# Patient Record
Sex: FEMALE | Race: BLACK OR AFRICAN AMERICAN | NOT HISPANIC OR LATINO
[De-identification: names, ages, dates, MRNs, and addresses within clinical notes are randomized per-mention and may not be internally consistent; named-entity substitution may affect disease eponyms.]

---

## 2017-07-17 ENCOUNTER — APPOINTMENT (OUTPATIENT)
Dept: CARDIOLOGY | Facility: CLINIC | Age: 57
End: 2017-07-17

## 2017-10-30 ENCOUNTER — EMERGENCY (EMERGENCY)
Facility: HOSPITAL | Age: 57
LOS: 0 days | Discharge: HOME | End: 2017-10-30

## 2017-10-30 DIAGNOSIS — L02.01 CUTANEOUS ABSCESS OF FACE: ICD-10-CM

## 2017-10-30 DIAGNOSIS — R07.9 CHEST PAIN, UNSPECIFIED: ICD-10-CM

## 2017-10-30 DIAGNOSIS — I10 ESSENTIAL (PRIMARY) HYPERTENSION: ICD-10-CM

## 2017-10-30 DIAGNOSIS — Z98.891 HISTORY OF UTERINE SCAR FROM PREVIOUS SURGERY: ICD-10-CM

## 2017-10-30 DIAGNOSIS — Z23 ENCOUNTER FOR IMMUNIZATION: ICD-10-CM

## 2017-10-30 DIAGNOSIS — Z79.899 OTHER LONG TERM (CURRENT) DRUG THERAPY: ICD-10-CM

## 2018-02-20 ENCOUNTER — OUTPATIENT (OUTPATIENT)
Dept: OUTPATIENT SERVICES | Facility: HOSPITAL | Age: 58
LOS: 1 days | Discharge: HOME | End: 2018-02-20

## 2018-02-20 DIAGNOSIS — Z12.31 ENCOUNTER FOR SCREENING MAMMOGRAM FOR MALIGNANT NEOPLASM OF BREAST: ICD-10-CM

## 2018-02-27 ENCOUNTER — OUTPATIENT (OUTPATIENT)
Dept: OUTPATIENT SERVICES | Facility: HOSPITAL | Age: 58
LOS: 1 days | Discharge: HOME | End: 2018-02-27

## 2018-02-27 DIAGNOSIS — R92.8 OTHER ABNORMAL AND INCONCLUSIVE FINDINGS ON DIAGNOSTIC IMAGING OF BREAST: ICD-10-CM

## 2018-02-27 DIAGNOSIS — F17.200 NICOTINE DEPENDENCE, UNSPECIFIED, UNCOMPLICATED: ICD-10-CM

## 2018-02-27 DIAGNOSIS — M54.5 LOW BACK PAIN: ICD-10-CM

## 2018-03-07 ENCOUNTER — RESULT REVIEW (OUTPATIENT)
Age: 58
End: 2018-03-07

## 2018-03-07 ENCOUNTER — OUTPATIENT (OUTPATIENT)
Dept: OUTPATIENT SERVICES | Facility: HOSPITAL | Age: 58
LOS: 1 days | Discharge: HOME | End: 2018-03-07

## 2018-03-12 ENCOUNTER — OUTPATIENT (OUTPATIENT)
Dept: OUTPATIENT SERVICES | Facility: HOSPITAL | Age: 58
LOS: 1 days | Discharge: HOME | End: 2018-03-12

## 2018-03-12 ENCOUNTER — APPOINTMENT (OUTPATIENT)
Dept: PODIATRY | Facility: CLINIC | Age: 58
End: 2018-03-12
Payer: SUBSIDIZED

## 2018-03-12 VITALS
TEMPERATURE: 98 F | WEIGHT: 180 LBS | SYSTOLIC BLOOD PRESSURE: 176 MMHG | HEIGHT: 66 IN | BODY MASS INDEX: 28.93 KG/M2 | HEART RATE: 94 BPM | DIASTOLIC BLOOD PRESSURE: 96 MMHG

## 2018-03-12 DIAGNOSIS — Z86.39 PERSONAL HISTORY OF OTHER ENDOCRINE, NUTRITIONAL AND METABOLIC DISEASE: ICD-10-CM

## 2018-03-12 DIAGNOSIS — M67.479 GANGLION, UNSPECIFIED ANKLE AND FOOT: ICD-10-CM

## 2018-03-12 DIAGNOSIS — M79.671 PAIN IN RIGHT FOOT: ICD-10-CM

## 2018-03-12 DIAGNOSIS — Z86.79 PERSONAL HISTORY OF OTHER DISEASES OF THE CIRCULATORY SYSTEM: ICD-10-CM

## 2018-03-12 PROCEDURE — 99203 OFFICE O/P NEW LOW 30 MIN: CPT

## 2018-03-14 DIAGNOSIS — R92.8 OTHER ABNORMAL AND INCONCLUSIVE FINDINGS ON DIAGNOSTIC IMAGING OF BREAST: ICD-10-CM

## 2018-03-14 DIAGNOSIS — N63.10 UNSPECIFIED LUMP IN THE RIGHT BREAST, UNSPECIFIED QUADRANT: ICD-10-CM

## 2018-03-14 DIAGNOSIS — N64.89 OTHER SPECIFIED DISORDERS OF BREAST: ICD-10-CM

## 2018-03-26 ENCOUNTER — APPOINTMENT (OUTPATIENT)
Dept: PODIATRY | Facility: CLINIC | Age: 58
End: 2018-03-26

## 2018-03-30 ENCOUNTER — OUTPATIENT (OUTPATIENT)
Dept: OUTPATIENT SERVICES | Facility: HOSPITAL | Age: 58
LOS: 1 days | Discharge: HOME | End: 2018-03-30

## 2018-03-30 DIAGNOSIS — M67.479 GANGLION, UNSPECIFIED ANKLE AND FOOT: ICD-10-CM

## 2018-04-02 ENCOUNTER — APPOINTMENT (OUTPATIENT)
Dept: CARDIOLOGY | Facility: CLINIC | Age: 58
End: 2018-04-02

## 2018-05-29 ENCOUNTER — APPOINTMENT (OUTPATIENT)
Dept: NEUROLOGY | Facility: CLINIC | Age: 58
End: 2018-05-29

## 2018-06-08 ENCOUNTER — APPOINTMENT (OUTPATIENT)
Dept: PULMONOLOGY | Facility: CLINIC | Age: 58
End: 2018-06-08

## 2019-03-01 ENCOUNTER — TRANSCRIPTION ENCOUNTER (OUTPATIENT)
Age: 59
End: 2019-03-01

## 2019-11-18 ENCOUNTER — TRANSCRIPTION ENCOUNTER (OUTPATIENT)
Age: 59
End: 2019-11-18

## 2019-11-25 ENCOUNTER — EMERGENCY (EMERGENCY)
Facility: HOSPITAL | Age: 59
LOS: 0 days | Discharge: HOME | End: 2019-11-25
Admitting: EMERGENCY MEDICINE
Payer: MEDICAID

## 2019-11-25 VITALS
SYSTOLIC BLOOD PRESSURE: 157 MMHG | DIASTOLIC BLOOD PRESSURE: 72 MMHG | OXYGEN SATURATION: 96 % | HEART RATE: 103 BPM | RESPIRATION RATE: 18 BRPM | TEMPERATURE: 99 F

## 2019-11-25 DIAGNOSIS — F17.200 NICOTINE DEPENDENCE, UNSPECIFIED, UNCOMPLICATED: ICD-10-CM

## 2019-11-25 DIAGNOSIS — R09.81 NASAL CONGESTION: ICD-10-CM

## 2019-11-25 DIAGNOSIS — R05 COUGH: ICD-10-CM

## 2019-11-25 PROCEDURE — 99283 EMERGENCY DEPT VISIT LOW MDM: CPT

## 2019-11-25 RX ORDER — AZITHROMYCIN 500 MG/1
1 TABLET, FILM COATED ORAL
Qty: 6 | Refills: 0
Start: 2019-11-25 | End: 2019-11-29

## 2019-11-25 RX ORDER — PSEUDOEPHEDRINE HCL 30 MG
1 TABLET ORAL
Qty: 12 | Refills: 0
Start: 2019-11-25 | End: 2019-11-26

## 2019-11-25 NOTE — ED PROVIDER NOTE - PATIENT PORTAL LINK FT
You can access the FollowMyHealth Patient Portal offered by [facility] by registering at the following website: [email or web address]. By joining [employer]’s FollowMyHealth portal, you will also be able to view your health information using other applications (apps) compatible with our system.

## 2019-11-25 NOTE — ED PROVIDER NOTE - OBJECTIVE STATEMENT
58 year old female with pmhx of HTN, + smoker. presents with productive cough x 1 month 58 year old female with pmhx of HTN, + smoker. presents with productive cough x 1 month. Pt taking prednisone and tessalon pearles with no relief. no cp, sob, fever, chills, calf pain or swelling, recent travel or sick contacts.

## 2019-11-25 NOTE — ED PROVIDER NOTE - PHYSICAL EXAMINATION
CONST: Well appearing in NAD  EYES: PERRL, EOMI, Sclera and conjunctiva clear.   ENT: No nasal discharge. TM's clear.Oropharynx normal appearing, no erythema or exudates. No abscess or swelling. Uvula midline.   NECK: Non-tender, no meningeal signs. normal ROM. supple   CARD: Normal S1 S2; Normal rate and rhythm  RESP: Equal BS B/L, No wheezes, rhonchi or rales. No distress  GI: Soft, non-tender, non-distended. no cva tenderness. normal BS  MS: Normal ROM in all extremities. No midline spinal tenderness. pulses 2 +. no calf tenderness or swelling  SKIN: Warm, dry, no acute rashes. Good turgor

## 2019-11-25 NOTE — ED PROVIDER NOTE - NS ED ROS FT
Review of Systems:  	•	CONSTITUTIONAL - no fever, no diaphoresis, no chills  	•	SKIN - no rash  	•	HEMATOLOGIC - no bleeding, no bruising  	•	EYES - no eye pain, no blurry vision  	•	ENT - no change in hearing, no sore throat, no ear pain or tinnitus  	•	RESPIRATORY - no shortness of breath, + cough  	•	CARDIAC - no chest pain, no palpitations  	•	GI - no abd pain, no nausea, no vomiting, no diarrhea, no constipation  	•	GENITO-URINARY - no discharge, no dysuria; no hematuria, no increased urinary frequency  	•	MUSCULOSKELETAL - no joint paint, no swelling, no redness  	•	NEUROLOGIC - no weakness, no headache, no paresthesias, no LOC

## 2020-08-17 ENCOUNTER — OUTPATIENT (OUTPATIENT)
Dept: OUTPATIENT SERVICES | Facility: HOSPITAL | Age: 60
LOS: 1 days | Discharge: HOME | End: 2020-08-17
Payer: MEDICAID

## 2020-08-17 ENCOUNTER — RESULT REVIEW (OUTPATIENT)
Age: 60
End: 2020-08-17

## 2020-08-17 DIAGNOSIS — R22.32 LOCALIZED SWELLING, MASS AND LUMP, LEFT UPPER LIMB: ICD-10-CM

## 2020-08-17 PROCEDURE — 76882 US LMTD JT/FCL EVL NVASC XTR: CPT | Mod: 26,LT

## 2022-04-25 ENCOUNTER — EMERGENCY (EMERGENCY)
Facility: HOSPITAL | Age: 62
LOS: 0 days | Discharge: HOME | End: 2022-04-25
Attending: STUDENT IN AN ORGANIZED HEALTH CARE EDUCATION/TRAINING PROGRAM | Admitting: STUDENT IN AN ORGANIZED HEALTH CARE EDUCATION/TRAINING PROGRAM
Payer: MEDICAID

## 2022-04-25 VITALS
TEMPERATURE: 98 F | HEART RATE: 78 BPM | WEIGHT: 109.57 LBS | DIASTOLIC BLOOD PRESSURE: 68 MMHG | SYSTOLIC BLOOD PRESSURE: 119 MMHG | OXYGEN SATURATION: 100 % | RESPIRATION RATE: 18 BRPM

## 2022-04-25 VITALS — WEIGHT: 199.96 LBS

## 2022-04-25 DIAGNOSIS — Z53.29 PROCEDURE AND TREATMENT NOT CARRIED OUT BECAUSE OF PATIENT'S DECISION FOR OTHER REASONS: ICD-10-CM

## 2022-04-25 DIAGNOSIS — I10 ESSENTIAL (PRIMARY) HYPERTENSION: ICD-10-CM

## 2022-04-25 DIAGNOSIS — M25.512 PAIN IN LEFT SHOULDER: ICD-10-CM

## 2022-04-25 DIAGNOSIS — F17.200 NICOTINE DEPENDENCE, UNSPECIFIED, UNCOMPLICATED: ICD-10-CM

## 2022-04-25 DIAGNOSIS — E78.5 HYPERLIPIDEMIA, UNSPECIFIED: ICD-10-CM

## 2022-04-25 DIAGNOSIS — E78.00 PURE HYPERCHOLESTEROLEMIA, UNSPECIFIED: ICD-10-CM

## 2022-04-25 DIAGNOSIS — L81.9 DISORDER OF PIGMENTATION, UNSPECIFIED: ICD-10-CM

## 2022-04-25 PROCEDURE — 99283 EMERGENCY DEPT VISIT LOW MDM: CPT

## 2022-04-25 RX ORDER — METHOCARBAMOL 500 MG/1
1 TABLET, FILM COATED ORAL
Qty: 21 | Refills: 0
Start: 2022-04-25 | End: 2022-05-01

## 2022-04-25 RX ORDER — IBUPROFEN 200 MG
600 TABLET ORAL ONCE
Refills: 0 | Status: COMPLETED | OUTPATIENT
Start: 2022-04-25 | End: 2022-04-25

## 2022-04-25 RX ORDER — IBUPROFEN 200 MG
1 TABLET ORAL
Qty: 21 | Refills: 0
Start: 2022-04-25 | End: 2022-05-01

## 2022-04-25 RX ORDER — METHOCARBAMOL 500 MG/1
750 TABLET, FILM COATED ORAL ONCE
Refills: 0 | Status: COMPLETED | OUTPATIENT
Start: 2022-04-25 | End: 2022-04-25

## 2022-04-25 RX ADMIN — METHOCARBAMOL 750 MILLIGRAM(S): 500 TABLET, FILM COATED ORAL at 10:50

## 2022-04-25 RX ADMIN — Medication 600 MILLIGRAM(S): at 10:51

## 2022-04-25 RX ADMIN — Medication 600 MILLIGRAM(S): at 11:30

## 2022-04-25 NOTE — ED PROVIDER NOTE - ATTENDING APP SHARED VISIT CONTRIBUTION OF CARE
61-year-old female with a past medical history significant for hypertension who presents with left shoulder pain.  Patient states that she has been having left shoulder pain for the last month.  Patient states that the pain is worse when trying to elevate the arm.  Patient denies any chest pain shortness of breath fever chills or trauma to the area.  Patient also denies any numbness tingling paresthesias or any other medical complaints.    VITAL SIGNS: I have reviewed nursing notes and confirm.  CONSTITUTIONAL: non-toxic, well appearing  SKIN: no rash, no petechiae.  EYES: PERRL, EOMI, pink conjunctiva, anicteric  ENT: tongue midline, no exudates, MMM  NECK: Supple; no meningismus, no JVD  CARD: RRR, no murmurs, equal radial pulses bilaterally 2+  RESP: CTAB, no respiratory distress  EXT: Normal ROM x4.   Detailed Left Arm Exam:      Inspection: unremarkable. No deformity, bruises, erythema, abrasions, or swelling.      Palpation: No tenderness, warmth, crepitus, or deformity.      Sensation: light touch intact.      Motor: wrist ROM WNL, elbow ROM WNL, and shoulder ROM WNL (with pain past 90 degrees) .      Perfusion: perfusion WNL. Radial pulse 2+.  NEURO: Alert, oriented x3.     a/p  61 yr old f that presents with L shoulder pain   -imaging  -pain management  -will dc pt with pcp/ortho follow up and strict return precautions. 61-year-old female with a past medical history significant for hypertension who presents with left shoulder pain.  Patient states that she has been having left shoulder pain for the last month.  Patient states that the pain is worse when trying to elevate the arm.  Patient denies any chest pain shortness of breath fever chills or trauma to the area.  Patient also denies any numbness tingling paresthesias or any other medical complaints.    VITAL SIGNS: I have reviewed nursing notes and confirm.  CONSTITUTIONAL: non-toxic, well appearing  SKIN: no rash, no petechiae.  EYES: PERRL, EOMI, pink conjunctiva, anicteric  ENT: tongue midline, no exudates, MMM  NECK: Supple; no meningismus, no JVD  CARD: RRR, no murmurs, equal radial pulses bilaterally 2+  RESP: CTAB, no respiratory distress  EXT: Normal ROM x4.   Detailed Left Arm Exam:      Inspection: unremarkable. No deformity, bruises, erythema, abrasions, or swelling.      Palpation: No tenderness, warmth, crepitus, or deformity.      Sensation: light touch intact.      Motor: wrist ROM WNL, elbow ROM WNL, and shoulder ROM WNL (with pain past 90 degrees) .      Perfusion: perfusion WNL. Radial pulse 2+.  NEURO: Alert, oriented x3.     a/p  61 yr old f that presents with L shoulder pain   -imaging  -pain management  -pt eloped during evaluation

## 2022-04-25 NOTE — ED ADULT NURSE NOTE - NS ED NURSE ELOPE COMMENTS
Pt stated, "This XR is taking forever. I'm leaving." MICHAEL Abrams spoke to pt and gave verbal ff up instructions.

## 2022-04-25 NOTE — ED ADULT NURSE NOTE - HIV OFFER
Opt out Chart reviewed. IE Completed. Patient without complaints of pain at rest, agreeable to PT. Patient received OOB in chair, NAD, +tele, +IV hep lock, wife present, RN Esha cleared patient for treatment.

## 2022-04-25 NOTE — ED PROVIDER NOTE - OBJECTIVE STATEMENT
60 y/o female with hx HTN, HDL, smoker presents to the ED c/o "I have left shoulder pain for over 1 month. It is worse with movement. I don't remember injuring it. I also have some skin discoloration to my pinky finger." no cp/ sob/ dizziness/ weakness

## 2022-04-25 NOTE — ED PROVIDER NOTE - CLINICAL SUMMARY MEDICAL DECISION MAKING FREE TEXT BOX
61 yr old f that presents with L shoulder pain   -imaging  -pain management  -pt eloped during evaluation

## 2022-04-25 NOTE — ED PROVIDER NOTE - NS ED ATTENDING STATEMENT MOD
This was a shared visit with the DANICA. I reviewed and verified the documentation and independently performed the documented:

## 2022-05-05 PROBLEM — E78.00 PURE HYPERCHOLESTEROLEMIA, UNSPECIFIED: Chronic | Status: ACTIVE | Noted: 2022-04-25

## 2022-05-05 PROBLEM — I10 ESSENTIAL (PRIMARY) HYPERTENSION: Chronic | Status: ACTIVE | Noted: 2022-04-25

## 2022-07-21 ENCOUNTER — APPOINTMENT (OUTPATIENT)
Dept: PLASTIC SURGERY | Facility: CLINIC | Age: 62
End: 2022-07-21

## 2022-09-14 ENCOUNTER — OUTPATIENT (OUTPATIENT)
Dept: OUTPATIENT SERVICES | Facility: HOSPITAL | Age: 62
LOS: 1 days | Discharge: HOME | End: 2022-09-14

## 2022-09-14 ENCOUNTER — APPOINTMENT (OUTPATIENT)
Dept: GASTROENTEROLOGY | Facility: CLINIC | Age: 62
End: 2022-09-14

## 2022-09-14 ENCOUNTER — NON-APPOINTMENT (OUTPATIENT)
Age: 62
End: 2022-09-14

## 2022-09-14 VITALS
WEIGHT: 228 LBS | TEMPERATURE: 98 F | BODY MASS INDEX: 36.64 KG/M2 | HEART RATE: 75 BPM | DIASTOLIC BLOOD PRESSURE: 76 MMHG | HEIGHT: 66 IN | OXYGEN SATURATION: 93 % | SYSTOLIC BLOOD PRESSURE: 134 MMHG

## 2022-09-14 DIAGNOSIS — R11.0 NAUSEA: ICD-10-CM

## 2022-09-14 DIAGNOSIS — F17.200 NICOTINE DEPENDENCE, UNSPECIFIED, UNCOMPLICATED: ICD-10-CM

## 2022-09-14 DIAGNOSIS — R11.10 VOMITING, UNSPECIFIED: ICD-10-CM

## 2022-09-14 DIAGNOSIS — Z12.11 ENCOUNTER FOR SCREENING FOR MALIGNANT NEOPLASM OF COLON: ICD-10-CM

## 2022-09-14 DIAGNOSIS — Z00.00 ENCOUNTER FOR GENERAL ADULT MEDICAL EXAMINATION WITHOUT ABNORMAL FINDINGS: ICD-10-CM

## 2022-09-14 DIAGNOSIS — Z80.3 FAMILY HISTORY OF MALIGNANT NEOPLASM OF BREAST: ICD-10-CM

## 2022-09-14 DIAGNOSIS — R10.13 EPIGASTRIC PAIN: ICD-10-CM

## 2022-09-14 DIAGNOSIS — Z80.0 FAMILY HISTORY OF MALIGNANT NEOPLASM OF DIGESTIVE ORGANS: ICD-10-CM

## 2022-09-14 PROCEDURE — 99203 OFFICE O/P NEW LOW 30 MIN: CPT | Mod: GC

## 2022-09-15 PROBLEM — Z12.11 COLON CANCER SCREENING: Status: ACTIVE | Noted: 2022-09-15

## 2022-09-15 PROBLEM — R11.10 VOMITING: Status: ACTIVE | Noted: 2022-09-15

## 2022-09-15 PROBLEM — R11.0 NAUSEA: Status: ACTIVE | Noted: 2022-09-15

## 2022-09-15 NOTE — HISTORY OF PRESENT ILLNESS
[FreeTextEntry1] :  61-year-old female known to have DM and HTN presenting for several months history of nausea.\par History goes back to 5 months ago when patient started having random episodes of nausea. mostly occurring around to times a week, sometimes it is postprandial or around strong odors.\par Associated with vomiting (not all the times), non bilious non bloody and with bloating after eating.\par No abdominal pain, no melena, no fresh blood in stool or vomit, no weight loss, no change in bowel habits.\par Patient smokes and drinks coffee daily\par Her sister has history of gastric cancer and other sister has breast cancer.\par She is otherwise doing well and has no complaints.

## 2022-09-15 NOTE — REVIEW OF SYSTEMS
[Vomiting] : vomiting [Heartburn] : heartburn [Bloating (gassiness)] : bloating [Negative] : Respiratory [Heart Rate Is Fast] : the heart rate was not fast [Chest Pain] : no chest pain [As Noted in HPI] : as noted in HPI [Joint Swelling] : no joint swelling [Confused] : no confusion [Dizziness] : no dizziness

## 2022-09-15 NOTE — ASSESSMENT
[FreeTextEntry1] : 61-year-old female known to have DM and HTN presenting for several months history of nausea.\par \par #Nausea and bloating\par - Symptoms could be due to gerd/gatritis vs malignancy\par - Patient is not on Protonix\par - Advised to avoid smoking, coffee, and alcohol intake\par - Advised to not lay down right after eating\par - Will start on protonix daily\par - Will schedule an upper endoscopy given her age and her family history \par \par #HCM\par - Patient did a colonoscopy around 7-10 years ago\par - Will schedule for a colonoscopy with the endoscopy

## 2022-09-15 NOTE — PHYSICAL EXAM
[Alert] : alert [Healthy Appearing] : healthy appearing [Normal Appearance] : the appearance of the neck was normal [No Respiratory Distress] : no respiratory distress [Auscultation Breath Sounds / Voice Sounds] : lungs were clear to auscultation bilaterally [Heart Rate And Rhythm] : heart rate was normal and rhythm regular [Normal S1, S2] : normal S1 and S2 [Bowel Sounds] : normal bowel sounds [Abdomen Tenderness] : non-tender [Abdomen Soft] : soft [Normal Voice/Communication] : normal voice/communication [No Acute Distress] : no acute distress [Sclera] : the sclera and conjunctiva were normal [No Acc Muscle Use] : no accessory muscle use [No CVA Tenderness] : no CVA  tenderness [No Spinal Tenderness] : no spinal tenderness [] : no rash [Oriented To Time, Place, And Person] : oriented to person, place, and time

## 2022-10-11 ENCOUNTER — LABORATORY RESULT (OUTPATIENT)
Age: 62
End: 2022-10-11

## 2022-12-27 ENCOUNTER — EMERGENCY (EMERGENCY)
Facility: HOSPITAL | Age: 62
LOS: 0 days | Discharge: HOME | End: 2022-12-27
Attending: STUDENT IN AN ORGANIZED HEALTH CARE EDUCATION/TRAINING PROGRAM | Admitting: STUDENT IN AN ORGANIZED HEALTH CARE EDUCATION/TRAINING PROGRAM
Payer: MEDICAID

## 2022-12-27 VITALS
DIASTOLIC BLOOD PRESSURE: 74 MMHG | RESPIRATION RATE: 20 BRPM | SYSTOLIC BLOOD PRESSURE: 159 MMHG | OXYGEN SATURATION: 95 % | HEART RATE: 91 BPM | TEMPERATURE: 99 F

## 2022-12-27 DIAGNOSIS — F17.200 NICOTINE DEPENDENCE, UNSPECIFIED, UNCOMPLICATED: ICD-10-CM

## 2022-12-27 DIAGNOSIS — J44.9 CHRONIC OBSTRUCTIVE PULMONARY DISEASE, UNSPECIFIED: ICD-10-CM

## 2022-12-27 DIAGNOSIS — R05.9 COUGH, UNSPECIFIED: ICD-10-CM

## 2022-12-27 DIAGNOSIS — E78.00 PURE HYPERCHOLESTEROLEMIA, UNSPECIFIED: ICD-10-CM

## 2022-12-27 DIAGNOSIS — I10 ESSENTIAL (PRIMARY) HYPERTENSION: ICD-10-CM

## 2022-12-27 DIAGNOSIS — B34.9 VIRAL INFECTION, UNSPECIFIED: ICD-10-CM

## 2022-12-27 DIAGNOSIS — M79.10 MYALGIA, UNSPECIFIED SITE: ICD-10-CM

## 2022-12-27 PROCEDURE — 99284 EMERGENCY DEPT VISIT MOD MDM: CPT

## 2022-12-27 RX ORDER — PHENYLEPHRINE HCL, BROMPHENIRAMINE MALEATE 5; 2 MG/10ML; MG/10ML
10 SOLUTION ORAL
Qty: 420 | Refills: 0
Start: 2022-12-27 | End: 2023-01-02

## 2022-12-27 NOTE — ED PROVIDER NOTE - OBJECTIVE STATEMENT
Patient is a 62-year-old female, history of hyperlipidemia, hypertension, COPD, current smoker.  Presenting for cough and body aches.  Patient was recently at a  and was exposed to small children with respiratory symptoms.  Denies fever or productive cough.  Denies chest pain or dyspnea.  Denies prior COPD exacerbations or need for supplemental oxygen.  No other complaints - no rhinorrhea, sore throat, palpitations, abd pain, NVD, dysuria, hematuria, new joint pain, FND, rash, trauma.

## 2022-12-27 NOTE — ED PROVIDER NOTE - CLINICAL SUMMARY MEDICAL DECISION MAKING FREE TEXT BOX
pt p/w viral syndrome since Friday. No vomiting, chest pain, sob. flu + covid swab sent, pt given supportive care,  Patient given return precautions, f/u instructions and verbalizes understanding.

## 2022-12-27 NOTE — ED PROVIDER NOTE - PROGRESS NOTE DETAILS
Resident AO: Discussed management and care with patient - she was comfortable with discharge, and agreed. Return precautions given.

## 2022-12-27 NOTE — ED PROVIDER NOTE - PHYSICAL EXAMINATION
_  Vital signs reviewed; ABCs intact  GENERAL: Well nourished, comfortable  SKIN: Warm, dry  HEAD & NECK: NCAT, supple neck  EYES: EOMI, PER B/L  ENT: MMM; uvula midline; no oropharyngeal erythema or exudates  CARD: RRR, S1, S2; no murmurs, no rubs, no gallops  RESP: Normal respiratory effort, CTAB, no rales, no wheezing  ABD: Soft, ND, NT, no rebound, no guarding, +BS; no CVAT  EXT: Pulses palpable distally  NEUROMSK: Grossly intact  PSYCH: AAOx3, cooperative, appropriate

## 2022-12-27 NOTE — ED PROVIDER NOTE - NSFOLLOWUPINSTRUCTIONS_ED_ALL_ED_FT
Take bromfed as prescribed for cough.     You can receive your FLU + COVID result by registering on the- [employer]. You can also call [phone] [place] [address] for results.    Viral Respiratory Infection    A viral respiratory infection is an illness that affects parts of the body used for breathing, like the lungs, nose, and throat. It is caused by a germ called a virus. Symptoms can include runny nose, coughing, sneezing, fatigue, body aches, sore throat, fever, or headache. Over the counter medicine can be used to manage the symptoms but the infection itself goes away on its own.     SEEK IMMEDIATE MEDICAL CARE IF YOU HAVE THE FOLLOWING SYMPTOMS: shortness of breath, chest pain, fever over 10 days, lightheadedness/dizziness.

## 2022-12-27 NOTE — ED PROVIDER NOTE - PATIENT PORTAL LINK FT
You can access the FollowMyHealth Patient Portal offered by [facility] by registering at the following website: [email or web address]. By joining [employer]’s FollowMyHealth portal, you will also be able to view your health information using other applications (apps) compatible with our system.

## 2022-12-28 RX ORDER — PHENYLEPHRINE HCL, BROMPHENIRAMINE MALEATE 5; 2 MG/10ML; MG/10ML
10 SOLUTION ORAL
Qty: 420 | Refills: 0
Start: 2022-12-28 | End: 2023-01-03

## 2023-01-20 ENCOUNTER — OUTPATIENT (OUTPATIENT)
Dept: OUTPATIENT SERVICES | Facility: HOSPITAL | Age: 63
LOS: 1 days | Discharge: HOME | End: 2023-01-20
Payer: MEDICAID

## 2023-01-20 DIAGNOSIS — R92.8 OTHER ABNORMAL AND INCONCLUSIVE FINDINGS ON DIAGNOSTIC IMAGING OF BREAST: ICD-10-CM

## 2023-01-20 PROCEDURE — 76642 ULTRASOUND BREAST LIMITED: CPT | Mod: 26,LT

## 2023-01-20 PROCEDURE — 77061 BREAST TOMOSYNTHESIS UNI: CPT | Mod: 26

## 2023-01-20 PROCEDURE — 77065 DX MAMMO INCL CAD UNI: CPT | Mod: 26,LT

## 2023-03-17 ENCOUNTER — INPATIENT (INPATIENT)
Facility: HOSPITAL | Age: 63
LOS: 11 days | Discharge: HOME HEALTH | DRG: 165 | End: 2023-03-29
Attending: THORACIC SURGERY (CARDIOTHORACIC VASCULAR SURGERY) | Admitting: INTERNAL MEDICINE
Payer: MEDICAID

## 2023-03-17 VITALS
WEIGHT: 210.1 LBS | SYSTOLIC BLOOD PRESSURE: 159 MMHG | HEART RATE: 87 BPM | HEIGHT: 66 IN | TEMPERATURE: 99 F | DIASTOLIC BLOOD PRESSURE: 90 MMHG | RESPIRATION RATE: 20 BRPM

## 2023-03-17 LAB
ALBUMIN SERPL ELPH-MCNC: 4.4 G/DL — SIGNIFICANT CHANGE UP (ref 3.5–5.2)
ALP SERPL-CCNC: 111 U/L — SIGNIFICANT CHANGE UP (ref 30–115)
ALT FLD-CCNC: 12 U/L — SIGNIFICANT CHANGE UP (ref 0–41)
ANION GAP SERPL CALC-SCNC: 8 MMOL/L — SIGNIFICANT CHANGE UP (ref 7–14)
AST SERPL-CCNC: 17 U/L — SIGNIFICANT CHANGE UP (ref 0–41)
BASOPHILS # BLD AUTO: 0.03 K/UL — SIGNIFICANT CHANGE UP (ref 0–0.2)
BASOPHILS NFR BLD AUTO: 0.6 % — SIGNIFICANT CHANGE UP (ref 0–1)
BILIRUB SERPL-MCNC: 0.3 MG/DL — SIGNIFICANT CHANGE UP (ref 0.2–1.2)
BUN SERPL-MCNC: 10 MG/DL — SIGNIFICANT CHANGE UP (ref 10–20)
CALCIUM SERPL-MCNC: 9.6 MG/DL — SIGNIFICANT CHANGE UP (ref 8.4–10.5)
CHLORIDE SERPL-SCNC: 101 MMOL/L — SIGNIFICANT CHANGE UP (ref 98–110)
CO2 SERPL-SCNC: 28 MMOL/L — SIGNIFICANT CHANGE UP (ref 17–32)
CREAT SERPL-MCNC: 0.9 MG/DL — SIGNIFICANT CHANGE UP (ref 0.7–1.5)
EGFR: 71 ML/MIN/1.73M2 — SIGNIFICANT CHANGE UP
EOSINOPHIL # BLD AUTO: 0.25 K/UL — SIGNIFICANT CHANGE UP (ref 0–0.7)
EOSINOPHIL NFR BLD AUTO: 4.8 % — SIGNIFICANT CHANGE UP (ref 0–8)
GLUCOSE SERPL-MCNC: 96 MG/DL — SIGNIFICANT CHANGE UP (ref 70–99)
HCT VFR BLD CALC: 36.7 % — LOW (ref 37–47)
HGB BLD-MCNC: 12.1 G/DL — SIGNIFICANT CHANGE UP (ref 12–16)
IMM GRANULOCYTES NFR BLD AUTO: 0.2 % — SIGNIFICANT CHANGE UP (ref 0.1–0.3)
LIDOCAIN IGE QN: 43 U/L — SIGNIFICANT CHANGE UP (ref 7–60)
LYMPHOCYTES # BLD AUTO: 2.34 K/UL — SIGNIFICANT CHANGE UP (ref 1.2–3.4)
LYMPHOCYTES # BLD AUTO: 44.7 % — SIGNIFICANT CHANGE UP (ref 20.5–51.1)
MCHC RBC-ENTMCNC: 30.5 PG — SIGNIFICANT CHANGE UP (ref 27–31)
MCHC RBC-ENTMCNC: 33 G/DL — SIGNIFICANT CHANGE UP (ref 32–37)
MCV RBC AUTO: 92.4 FL — SIGNIFICANT CHANGE UP (ref 81–99)
MONOCYTES # BLD AUTO: 0.52 K/UL — SIGNIFICANT CHANGE UP (ref 0.1–0.6)
MONOCYTES NFR BLD AUTO: 9.9 % — HIGH (ref 1.7–9.3)
NEUTROPHILS # BLD AUTO: 2.08 K/UL — SIGNIFICANT CHANGE UP (ref 1.4–6.5)
NEUTROPHILS NFR BLD AUTO: 39.8 % — LOW (ref 42.2–75.2)
NRBC # BLD: 0 /100 WBCS — SIGNIFICANT CHANGE UP (ref 0–0)
PLATELET # BLD AUTO: 187 K/UL — SIGNIFICANT CHANGE UP (ref 130–400)
POTASSIUM SERPL-MCNC: 4.3 MMOL/L — SIGNIFICANT CHANGE UP (ref 3.5–5)
POTASSIUM SERPL-SCNC: 4.3 MMOL/L — SIGNIFICANT CHANGE UP (ref 3.5–5)
PROT SERPL-MCNC: 7.5 G/DL — SIGNIFICANT CHANGE UP (ref 6–8)
RBC # BLD: 3.97 M/UL — LOW (ref 4.2–5.4)
RBC # FLD: 13.4 % — SIGNIFICANT CHANGE UP (ref 11.5–14.5)
SARS-COV-2 RNA SPEC QL NAA+PROBE: SIGNIFICANT CHANGE UP
SODIUM SERPL-SCNC: 137 MMOL/L — SIGNIFICANT CHANGE UP (ref 135–146)
TROPONIN T SERPL-MCNC: <0.01 NG/ML — SIGNIFICANT CHANGE UP
TROPONIN T SERPL-MCNC: <0.01 NG/ML — SIGNIFICANT CHANGE UP
WBC # BLD: 5.23 K/UL — SIGNIFICANT CHANGE UP (ref 4.8–10.8)
WBC # FLD AUTO: 5.23 K/UL — SIGNIFICANT CHANGE UP (ref 4.8–10.8)

## 2023-03-17 PROCEDURE — 71250 CT THORAX DX C-: CPT | Mod: 26,MA

## 2023-03-17 PROCEDURE — 71046 X-RAY EXAM CHEST 2 VIEWS: CPT | Mod: 26

## 2023-03-17 PROCEDURE — 99223 1ST HOSP IP/OBS HIGH 75: CPT

## 2023-03-17 RX ORDER — ASPIRIN/CALCIUM CARB/MAGNESIUM 324 MG
81 TABLET ORAL DAILY
Refills: 0 | Status: DISCONTINUED | OUTPATIENT
Start: 2023-03-17 | End: 2023-03-23

## 2023-03-17 RX ORDER — CARVEDILOL PHOSPHATE 80 MG/1
6.25 CAPSULE, EXTENDED RELEASE ORAL EVERY 12 HOURS
Refills: 0 | Status: DISCONTINUED | OUTPATIENT
Start: 2023-03-17 | End: 2023-03-18

## 2023-03-17 RX ORDER — AMLODIPINE BESYLATE 2.5 MG/1
10 TABLET ORAL DAILY
Refills: 0 | Status: DISCONTINUED | OUTPATIENT
Start: 2023-03-17 | End: 2023-03-22

## 2023-03-17 RX ORDER — ATORVASTATIN CALCIUM 80 MG/1
40 TABLET, FILM COATED ORAL AT BEDTIME
Refills: 0 | Status: DISCONTINUED | OUTPATIENT
Start: 2023-03-17 | End: 2023-03-23

## 2023-03-17 RX ADMIN — CARVEDILOL PHOSPHATE 6.25 MILLIGRAM(S): 80 CAPSULE, EXTENDED RELEASE ORAL at 19:20

## 2023-03-17 RX ADMIN — ATORVASTATIN CALCIUM 40 MILLIGRAM(S): 80 TABLET, FILM COATED ORAL at 21:54

## 2023-03-17 NOTE — ED PROVIDER NOTE - OBJECTIVE STATEMENT
62y Female with past medical history of high cholesterol, hypertension, COPD, prediabetes, tobacco use presents for chest pain since last night.  Patient reports stabbing and squeezing chest pain at rest that is localized to the center and lower right side of her chest.  States it is nonradiating.  Endorses slight shortness of breath during the episodes and dizziness.  No fevers, chills, nausea, vomiting, diarrhea, abdominal pain, weakness, numbness, palpitations.  Patient endorses tobacco use.  Never had cardiac work-up in the past or stress test.  No family history of cardiac events.  Denies alcohol and substance use.

## 2023-03-17 NOTE — ED ADULT NURSE REASSESSMENT NOTE - NS ED NURSE REASSESS COMMENT FT1
Hand off report given by previous nurse Cain. PT came for chest pain. Pt is pending CTA in morning. She denies CP at this moment and on cardiac monitor. Plan of care explained to pt. Comfort and safety measure in place. Will continue to monitor pt.

## 2023-03-17 NOTE — ED PROVIDER NOTE - CLINICAL SUMMARY MEDICAL DECISION MAKING FREE TEXT BOX
62-year-old female presents today with chest pain since last night.  Patient is hemodynamically stable and well-appearing on evaluation.  Patient EKG and cardiac work-up performed which was unremarkable.  Patient placed into observation for further evaluation and care and testing.

## 2023-03-17 NOTE — ED CDU PROVIDER INITIAL DAY NOTE - CLINICAL SUMMARY MEDICAL DECISION MAKING FREE TEXT BOX
Pt presents with chest pain, CE neg. Chest x-ray showed a questionable retrocardiac opacity. CT scan chest no opacity, non specific LN. CCTA-calcium score is high. Stress testing.

## 2023-03-17 NOTE — ED PROVIDER NOTE - PHYSICAL EXAMINATION
VITAL SIGNS: I have reviewed nursing notes and confirm.  CONSTITUTIONAL: well-appearing, non-toxic, NAD  SKIN: Warm dry, normal skin turgor  HEAD: NCAT  EYES: EOMI, PERRLA, no scleral icterus  ENT: Moist mucous membranes, normal pharynx with no erythema or exudates  NECK: Supple; non tender. Full ROM. No cervical LAD  CARD: RRR, no murmurs, rubs or gallops, no chest wall tenderness  RESP: clear to ausculation b/l.  No rales, rhonchi, or wheezing.  ABD: soft, + BS, non-tender, non-distended, no rebound or guarding. No CVA tenderness  EXT: Full ROM, no bony tenderness, no pedal edema, no calf tenderness  NEURO: normal motor. normal sensory. CN II-XII intact. Cerebellar testing normal. Normal gait.  PSYCH: Cooperative, appropriate.

## 2023-03-17 NOTE — ED CDU PROVIDER INITIAL DAY NOTE - ATTENDING APP SHARED VISIT CONTRIBUTION OF CARE
Pt presents with a feeling of "rushing" in her chest. She describes a thumping in he chest. This was associated with shortness of breath and diaphoresis. Pt was watching TV at that time. Now is feeling better. Hx of HTN, DM, tobacco use, On exam S1S2 rrr, no murmur, lungs occasional rhonchi, ext neg for edema.

## 2023-03-17 NOTE — ED CDU PROVIDER INITIAL DAY NOTE - PROGRESS NOTE DETAILS
received signout from Caleb Erwin - pt in obs for evaluation of chest pain - no prior cardiac workup; ct chest(non-contrast) neg; ce/ekg x2, covid pending; pt to have ccta in am; will continue to follow;

## 2023-03-18 DIAGNOSIS — R07.89 OTHER CHEST PAIN: ICD-10-CM

## 2023-03-18 LAB — D DIMER BLD IA.RAPID-MCNC: 203 NG/ML DDU — SIGNIFICANT CHANGE UP

## 2023-03-18 PROCEDURE — 85018 HEMOGLOBIN: CPT

## 2023-03-18 PROCEDURE — C1894: CPT

## 2023-03-18 PROCEDURE — 85610 PROTHROMBIN TIME: CPT

## 2023-03-18 PROCEDURE — C1769: CPT

## 2023-03-18 PROCEDURE — 87075 CULTR BACTERIA EXCEPT BLOOD: CPT

## 2023-03-18 PROCEDURE — 84436 ASSAY OF TOTAL THYROXINE: CPT

## 2023-03-18 PROCEDURE — 93970 EXTREMITY STUDY: CPT | Mod: 26

## 2023-03-18 PROCEDURE — C1751: CPT

## 2023-03-18 PROCEDURE — 81001 URINALYSIS AUTO W/SCOPE: CPT

## 2023-03-18 PROCEDURE — 86900 BLOOD TYPING SEROLOGIC ABO: CPT

## 2023-03-18 PROCEDURE — 84295 ASSAY OF SERUM SODIUM: CPT

## 2023-03-18 PROCEDURE — 80053 COMPREHEN METABOLIC PANEL: CPT

## 2023-03-18 PROCEDURE — C1889: CPT

## 2023-03-18 PROCEDURE — 99407 BEHAV CHNG SMOKING > 10 MIN: CPT

## 2023-03-18 PROCEDURE — 86891 AUTOLOGOUS BLOOD OP SALVAGE: CPT

## 2023-03-18 PROCEDURE — 88307 TISSUE EXAM BY PATHOLOGIST: CPT

## 2023-03-18 PROCEDURE — 87070 CULTURE OTHR SPECIMN AEROBIC: CPT

## 2023-03-18 PROCEDURE — 84443 ASSAY THYROID STIM HORMONE: CPT

## 2023-03-18 PROCEDURE — 82962 GLUCOSE BLOOD TEST: CPT

## 2023-03-18 PROCEDURE — 99223 1ST HOSP IP/OBS HIGH 75: CPT | Mod: 25

## 2023-03-18 PROCEDURE — 36415 COLL VENOUS BLD VENIPUNCTURE: CPT

## 2023-03-18 PROCEDURE — 93005 ELECTROCARDIOGRAM TRACING: CPT

## 2023-03-18 PROCEDURE — 83036 HEMOGLOBIN GLYCOSYLATED A1C: CPT

## 2023-03-18 PROCEDURE — 86850 RBC ANTIBODY SCREEN: CPT

## 2023-03-18 PROCEDURE — A9500: CPT

## 2023-03-18 PROCEDURE — 80048 BASIC METABOLIC PNL TOTAL CA: CPT

## 2023-03-18 PROCEDURE — 97163 PT EVAL HIGH COMPLEX 45 MIN: CPT | Mod: GP

## 2023-03-18 PROCEDURE — 71045 X-RAY EXAM CHEST 1 VIEW: CPT

## 2023-03-18 PROCEDURE — 97116 GAIT TRAINING THERAPY: CPT | Mod: GP

## 2023-03-18 PROCEDURE — 86901 BLOOD TYPING SEROLOGIC RH(D): CPT

## 2023-03-18 PROCEDURE — P9045: CPT

## 2023-03-18 PROCEDURE — 82803 BLOOD GASES ANY COMBINATION: CPT

## 2023-03-18 PROCEDURE — 84100 ASSAY OF PHOSPHORUS: CPT

## 2023-03-18 PROCEDURE — 85379 FIBRIN DEGRADATION QUANT: CPT

## 2023-03-18 PROCEDURE — 93017 CV STRESS TEST TRACING ONLY: CPT

## 2023-03-18 PROCEDURE — 85027 COMPLETE CBC AUTOMATED: CPT

## 2023-03-18 PROCEDURE — 84132 ASSAY OF SERUM POTASSIUM: CPT

## 2023-03-18 PROCEDURE — 85730 THROMBOPLASTIN TIME PARTIAL: CPT

## 2023-03-18 PROCEDURE — 87640 STAPH A DNA AMP PROBE: CPT

## 2023-03-18 PROCEDURE — 97110 THERAPEUTIC EXERCISES: CPT | Mod: GP

## 2023-03-18 PROCEDURE — 80061 LIPID PANEL: CPT

## 2023-03-18 PROCEDURE — 83735 ASSAY OF MAGNESIUM: CPT

## 2023-03-18 PROCEDURE — C1768: CPT

## 2023-03-18 PROCEDURE — 93306 TTE W/DOPPLER COMPLETE: CPT

## 2023-03-18 PROCEDURE — 87641 MR-STAPH DNA AMP PROBE: CPT

## 2023-03-18 PROCEDURE — 99233 SBSQ HOSP IP/OBS HIGH 50: CPT

## 2023-03-18 PROCEDURE — 85014 HEMATOCRIT: CPT

## 2023-03-18 PROCEDURE — 94640 AIRWAY INHALATION TREATMENT: CPT

## 2023-03-18 PROCEDURE — 78452 HT MUSCLE IMAGE SPECT MULT: CPT | Mod: MA

## 2023-03-18 PROCEDURE — 84480 ASSAY TRIIODOTHYRONINE (T3): CPT

## 2023-03-18 PROCEDURE — 75571 CT HRT W/O DYE W/CA TEST: CPT | Mod: 26,MA

## 2023-03-18 PROCEDURE — C1887: CPT

## 2023-03-18 PROCEDURE — 83880 ASSAY OF NATRIURETIC PEPTIDE: CPT

## 2023-03-18 PROCEDURE — 71046 X-RAY EXAM CHEST 2 VIEWS: CPT

## 2023-03-18 PROCEDURE — 83605 ASSAY OF LACTIC ACID: CPT

## 2023-03-18 PROCEDURE — 85025 COMPLETE CBC W/AUTO DIFF WBC: CPT

## 2023-03-18 PROCEDURE — 86803 HEPATITIS C AB TEST: CPT

## 2023-03-18 PROCEDURE — 93880 EXTRACRANIAL BILAT STUDY: CPT

## 2023-03-18 PROCEDURE — 93458 L HRT ARTERY/VENTRICLE ANGIO: CPT

## 2023-03-18 PROCEDURE — 94010 BREATHING CAPACITY TEST: CPT

## 2023-03-18 PROCEDURE — 86923 COMPATIBILITY TEST ELECTRIC: CPT

## 2023-03-18 PROCEDURE — 82330 ASSAY OF CALCIUM: CPT

## 2023-03-18 PROCEDURE — 93970 EXTREMITY STUDY: CPT

## 2023-03-18 RX ORDER — ENOXAPARIN SODIUM 100 MG/ML
40 INJECTION SUBCUTANEOUS EVERY 24 HOURS
Refills: 0 | Status: DISCONTINUED | OUTPATIENT
Start: 2023-03-18 | End: 2023-03-19

## 2023-03-18 RX ORDER — DIPHENHYDRAMINE HCL 50 MG
50 CAPSULE ORAL ONCE
Refills: 0 | Status: COMPLETED | OUTPATIENT
Start: 2023-03-18 | End: 2023-03-18

## 2023-03-18 RX ORDER — PANTOPRAZOLE SODIUM 20 MG/1
40 TABLET, DELAYED RELEASE ORAL EVERY 12 HOURS
Refills: 0 | Status: DISCONTINUED | OUTPATIENT
Start: 2023-03-18 | End: 2023-03-23

## 2023-03-18 RX ORDER — ALBUTEROL 90 UG/1
2 AEROSOL, METERED ORAL EVERY 6 HOURS
Refills: 0 | Status: DISCONTINUED | OUTPATIENT
Start: 2023-03-18 | End: 2023-03-23

## 2023-03-18 RX ORDER — REGADENOSON 0.08 MG/ML
0.4 INJECTION, SOLUTION INTRAVENOUS ONCE
Refills: 0 | Status: COMPLETED | OUTPATIENT
Start: 2023-03-18 | End: 2023-03-19

## 2023-03-18 RX ORDER — METOPROLOL TARTRATE 50 MG
50 TABLET ORAL ONCE
Refills: 0 | Status: COMPLETED | OUTPATIENT
Start: 2023-03-18 | End: 2023-03-18

## 2023-03-18 RX ORDER — METOPROLOL TARTRATE 50 MG
100 TABLET ORAL ONCE
Refills: 0 | Status: COMPLETED | OUTPATIENT
Start: 2023-03-18 | End: 2023-03-18

## 2023-03-18 RX ORDER — TIOTROPIUM BROMIDE 18 UG/1
2 CAPSULE ORAL; RESPIRATORY (INHALATION) DAILY
Refills: 0 | Status: DISCONTINUED | OUTPATIENT
Start: 2023-03-18 | End: 2023-03-23

## 2023-03-18 RX ADMIN — Medication 81 MILLIGRAM(S): at 14:41

## 2023-03-18 RX ADMIN — ATORVASTATIN CALCIUM 40 MILLIGRAM(S): 80 TABLET, FILM COATED ORAL at 21:31

## 2023-03-18 RX ADMIN — PANTOPRAZOLE SODIUM 40 MILLIGRAM(S): 20 TABLET, DELAYED RELEASE ORAL at 18:31

## 2023-03-18 RX ADMIN — Medication 100 MILLIGRAM(S): at 08:19

## 2023-03-18 RX ADMIN — Medication 50 MILLIGRAM(S): at 02:37

## 2023-03-18 RX ADMIN — Medication 100 MILLIGRAM(S): at 06:55

## 2023-03-18 RX ADMIN — CARVEDILOL PHOSPHATE 6.25 MILLIGRAM(S): 80 CAPSULE, EXTENDED RELEASE ORAL at 05:05

## 2023-03-18 RX ADMIN — ALBUTEROL 2 PUFF(S): 90 AEROSOL, METERED ORAL at 21:31

## 2023-03-18 RX ADMIN — AMLODIPINE BESYLATE 10 MILLIGRAM(S): 2.5 TABLET ORAL at 05:06

## 2023-03-18 NOTE — ED CDU PROVIDER SUBSEQUENT DAY NOTE - CLINICAL SUMMARY MEDICAL DECISION MAKING FREE TEXT BOX
Pt is in observation. CE neg. CCTA attempted, however due to a calcium score of 1500 this test could not be completed. Discussion with pt who agreed to stress testing.

## 2023-03-18 NOTE — H&P ADULT - ASSESSMENT
61 y/o female with PMHx of HTN, HLD, COPD not on home O2, pre-DM, active smoker presented to the ED for chest tightness since 9am yesterday.    #Chest Tightness with SOB  #Active Smoker   #COPD not on home O2   - episode of substernal chest tightness with SOB and dizziness  - FH+ MI in father at age 60  - EKG NSR with PVCs and nonspecific T wave abnormalities  - Trop negative x2   - CCTA shows calcium score 1504   - unable to obtain nuclear stress today due to availability of nuclear dye  - plan for nuclear stress tomorrow   - NPO after MN   - continue with ASA 81mg daily  - monitor on Tele     #HTN/HLD  - continue with carvedilol, amlodipine and atorvastatin 40mg     #COPD not home O2   - no active wheezing on exam     #Pre-DM  - currently not on medications  - monitor FS, if FS>180 start basal/bolus    #Active smoker  - hold nicotine patch for stress test     #DVT PPx: ambulating   #GI PPX: not indicated  #DASH/NPO after MN  61 y/o female with PMHx of HTN, HLD, COPD not on home O2, pre-DM, active smoker presented to the ED for chest tightness since 9am yesterday.    #Chest Tightness with SOB  #Active Smoker   #COPD not on home O2   - episode of substernal chest tightness with SOB and dizziness  - FH+ MI in father at age 60  - VS stable, on RA, not tachycardia   - EKG NSR with PVCs and nonspecific T wave abnormalities  - Trop negative x2   - CCTA shows calcium score 1504   - unable to obtain nuclear stress today due to availability of nuclear dye  - plan for nuclear stress tomorrow, NPO after MN   - check d-dimer (although low suspicion for PE) and consider CTA to r/o PE  - continue with ASA 81mg daily  - monitor on Tele     #Trace Pericardial Effusion  - check ECHO    #Prominent Mediastinal LN, possibly reactive   - afebrile, no leukocytosis    #HTN/HLD  - continue with amlodipine and atorvastatin 40mg   - resume carvedilol tomorrow (received metoprolol today for CCTA)    #COPD not home O2   - no active wheezing on exam     #Pre-DM  - currently not on medications  - monitor FS, if FS>180 start basal/bolus    #Active smoker  - hold nicotine patch for stress test     #DVT PPx: ambulating   #GI PPX: not indicated  #DASH/NPO after MN  63 y/o female with PMHx of HTN, HLD, COPD not on home O2, pre-DM, active smoker presented to the ED for chest tightness since 9am yesterday.    #Chest Tightness with SOB  #Active Smoker   #COPD not on home O2   - episode of substernal chest tightness with SOB and dizziness  - FH+ MI in father at age 60  - VS stable, on RA, not tachycardia   - EKG NSR with PVCs and nonspecific T wave abnormalities  - Trop negative x2   - CCTA shows calcium score 1504   - unable to obtain nuclear stress today due to availability of nuclear dye  - plan for nuclear stress tomorrow, NPO after MN   - check d-dimer (although low suspicion for PE) and LE duplex   - continue with ASA 81mg daily  - monitor on Tele     #Trace Pericardial Effusion incidentally noted on CCTA    #Prominent Mediastinal LN, possibly reactive   - afebrile, no leukocytosis    #HTN/HLD  - continue with amlodipine and atorvastatin 40mg   - resume carvedilol tomorrow (received metoprolol today for CCTA)    #COPD not home O2   - no active wheezing on exam     #Pre-DM  - currently not on medications  - monitor FS, if FS>180 start basal/bolus    #Active smoker  - hold nicotine patch for stress test     #DVT PPx: ambulating   #GI PPX: not indicated  #DASH/NPO after MN

## 2023-03-18 NOTE — H&P ADULT - NSHPLABSRESULTS_GEN_ALL_CORE
12.1   5.23  )-----------( 187      ( 17 Mar 2023 12:06 )             36.7     03-17    137  |  101  |  10  ----------------------------<  96  4.3   |  28  |  0.9    Ca    9.6      17 Mar 2023 12:06    TPro  7.5  /  Alb  4.4  /  TBili  0.3  /  DBili  x   /  AST  17  /  ALT  12  /  AlkPhos  111  03-17        CARDIAC MARKERS ( 17 Mar 2023 16:51 )  x     / <0.01 ng/mL / x     / x     / x      CARDIAC MARKERS ( 17 Mar 2023 12:06 )  x     / <0.01 ng/mL / x     / x     / x            LIVER FUNCTIONS - ( 17 Mar 2023 12:06 )  Alb: 4.4 g/dL / Pro: 7.5 g/dL / ALK PHOS: 111 U/L / ALT: 12 U/L / AST: 17 U/L / GGT: x             COVID-19 PCR: NotDetec (03-17-23 @ 16:51)          RADIOLOGY & ADDITIONAL STUDIES:    Your total calcium score is 1504 A.U.    CORONARY CALCIUM SCORE:  Vessel      Calcium volume     Calcium Score    =======================================================  LM:                    456                        531  LAD:                 467     527  LCX:                 169                        110  RCA:                 458                        306  =======================================================  Total:                1550                         1504

## 2023-03-18 NOTE — H&P ADULT - ATTENDING COMMENTS
My note supersedes the resident's note in the event of a discrepancy -    63 y/o female with PMHx of HTN, HLD, COPD not on home O2, pre-DM, active smoker presented to the ED for chest tightness since 9am yesterday.    Patient seen and examined this morning  sitting in bed  in nad  eating a sandwich    Vital Signs Last 24 Hrs  T(C): 36.4 (18 Mar 2023 15:38), Max: 36.4 (18 Mar 2023 00:40)  T(F): 97.6 (18 Mar 2023 15:38), Max: 97.6 (18 Mar 2023 15:38)  HR: 76 (18 Mar 2023 15:38) (70 - 78)  BP: 128/62 (18 Mar 2023 15:38) (111/67 - 137/70)  BP(mean): --  RR: 18 (18 Mar 2023 15:38) (17 - 18)  SpO2: 98% (18 Mar 2023 15:38) (97% - 99%)    Parameters below as of 18 Mar 2023 15:38  Patient On (Oxygen Delivery Method): room air    < from: CT Heart Calcium Score (03.18.23 @ 09:33) >    Impression:    Ranking Guide  The observed calcium score of 1504 is at 99 percentile for subjects of   the same age, gender, and race/ethnicity who are free of clinical   cardiovascular disease and treated diabetes. (BEAR SCORE)    Noncardiac Findings: Small pericardial effusion at the heart base    < end of copied text >        MEDICATIONS  (STANDING):  albuterol    90 MICROgram(s) HFA Inhaler 2 Puff(s) Inhalation every 6 hours  amLODIPine   Tablet 10 milliGRAM(s) Oral daily  aspirin enteric coated 81 milliGRAM(s) Oral daily  atorvastatin 40 milliGRAM(s) Oral at bedtime  regadenoson Injectable 0.4 milliGRAM(s) IV Push once  tiotropium 2.5 MICROgram(s) Inhaler 2 Puff(s) Inhalation daily    MEDICATIONS  (PRN):    #chest pain r/o acs  #HTN - well controlled   #DL  #small pericardial effusion     -admit to tele  -asa/statin  -CE negative x 2  -nuclear stress in am  -start PPI q12  -check D-dimer  -check LE doppler  -dvt prophylaxis   -outpatient repeat echo to evaluate small pericardial effusion     #obesity  -diet and lifestyle modification    #tobacco addiction  #COPD - not on home 02  -smoking cessation counseling discussed - counseling over 10min  -continue nebs    #DM II  -check fs qac and qhs    Progress Note Handoff  Pending Consults: none   Pending Tests: nuclear stress  Pending Results: labs, nuclear stress  Family Discussion: discussed smoking cessation, labs, all test and dc in am with pt and medical staff. Patient anticipated for discharge in am if stress test is unremarkable   Disposition: Home___x__/SNF______/Other_____/Unknown at this time_____  Spent over 75 min reviewing chart, speaking with patient/family and on coordinating patient care during interdisciplinary rounds

## 2023-03-18 NOTE — ED CDU PROVIDER SUBSEQUENT DAY NOTE - HISTORY
pt resting in obs without complaints; repeat ce/ekg neg; pt pending ccta this am; given metoprolol this am;

## 2023-03-18 NOTE — ED CDU PROVIDER DISPOSITION NOTE - CLINICAL COURSE
Pt presented with chest pain. CE neg. Attempted CCTA, however pt had a very high calcium score. Stress test ordered however nuclear medicine depleted. PT admitted for nuclear stress test tomorrow. Due to history, risks and calcium score pt is high risk for CAD and warrant admission to tele.

## 2023-03-18 NOTE — ED ADULT NURSE REASSESSMENT NOTE - NS ED NURSE REASSESS COMMENT FT1
Pt A/Ox3, VSS, cardiac monitoring in place, IV intact, pending CCTA. Denies pain, no complaints at this time, will continue to assess.

## 2023-03-18 NOTE — H&P ADULT - HISTORY OF PRESENT ILLNESS
62y Female with past medical history of high cholesterol, hypertension, COPD, prediabetes, tobacco use presents for chest pain since last night.  Patient reports stabbing and squeezing chest pain at rest that is localized to the center and lower right side of her chest.  States it is nonradiating.  Endorses slight shortness of breath during the episodes and dizziness.  No fevers, chills, nausea, vomiting, diarrhea, abdominal pain, weakness, numbness, palpitations.  Patient endorses tobacco use.  Never had cardiac work-up in the past or stress test.  No family history of cardiac events.  Denies alcohol and substance use. 63 y/o female with PMHx of HTN, HLD, COPD not on home O2, pre-DM, active smoker presented to the ED for chest tightness since 9am yesterday. She reports after breakfast she sat on the couch watching tv, when she suddenly developed substernal chest tightness with SOB and flushing. She got up to get her inhalers when she became dizzy prompting her ED visit yesterday. She described the chest tightness as non-radiating, substernal, improves on ambulation, worse on rest. She denies any similar episodes in the past. Denies any fevers, chills, abdominal pain, nausea, vomiting, diarrhea, constipation, LE swelling. Never had stress test before, no cardiac cath done. FH+ father  from MI at age 60.    In the ED, VS noted for T 36.4, HR 70, /77, RR 17, SpO2 98%. Labs noted for WBC 5, Hg 12, plt 187, K 4.3, Cr 0.9, Glu 96. Trop <0.01 x2. EKG showed sinus with PVCS and nonspecific T wave abnormality. Placed in ED-obs for CCTA. CCTA showed high calcium score. Admitted for nuclear stress test. Currently symptom free.

## 2023-03-19 LAB
ANION GAP SERPL CALC-SCNC: 10 MMOL/L — SIGNIFICANT CHANGE UP (ref 7–14)
BUN SERPL-MCNC: 12 MG/DL — SIGNIFICANT CHANGE UP (ref 10–20)
CALCIUM SERPL-MCNC: 9.5 MG/DL — SIGNIFICANT CHANGE UP (ref 8.4–10.5)
CHLORIDE SERPL-SCNC: 109 MMOL/L — SIGNIFICANT CHANGE UP (ref 98–110)
CHOLEST SERPL-MCNC: 102 MG/DL — SIGNIFICANT CHANGE UP
CO2 SERPL-SCNC: 26 MMOL/L — SIGNIFICANT CHANGE UP (ref 17–32)
CREAT SERPL-MCNC: 0.7 MG/DL — SIGNIFICANT CHANGE UP (ref 0.7–1.5)
EGFR: 97 ML/MIN/1.73M2 — SIGNIFICANT CHANGE UP
GLUCOSE SERPL-MCNC: 102 MG/DL — HIGH (ref 70–99)
HCT VFR BLD CALC: 37.3 % — SIGNIFICANT CHANGE UP (ref 37–47)
HDLC SERPL-MCNC: 37 MG/DL — LOW
HGB BLD-MCNC: 12.1 G/DL — SIGNIFICANT CHANGE UP (ref 12–16)
LIPID PNL WITH DIRECT LDL SERPL: 48 MG/DL — SIGNIFICANT CHANGE UP
MCHC RBC-ENTMCNC: 30.3 PG — SIGNIFICANT CHANGE UP (ref 27–31)
MCHC RBC-ENTMCNC: 32.4 G/DL — SIGNIFICANT CHANGE UP (ref 32–37)
MCV RBC AUTO: 93.3 FL — SIGNIFICANT CHANGE UP (ref 81–99)
NON HDL CHOLESTEROL: 65 MG/DL — SIGNIFICANT CHANGE UP
NRBC # BLD: 0 /100 WBCS — SIGNIFICANT CHANGE UP (ref 0–0)
PLATELET # BLD AUTO: 168 K/UL — SIGNIFICANT CHANGE UP (ref 130–400)
POTASSIUM SERPL-MCNC: 4.8 MMOL/L — SIGNIFICANT CHANGE UP (ref 3.5–5)
POTASSIUM SERPL-SCNC: 4.8 MMOL/L — SIGNIFICANT CHANGE UP (ref 3.5–5)
RBC # BLD: 4 M/UL — LOW (ref 4.2–5.4)
RBC # FLD: 13.7 % — SIGNIFICANT CHANGE UP (ref 11.5–14.5)
SODIUM SERPL-SCNC: 145 MMOL/L — SIGNIFICANT CHANGE UP (ref 135–146)
TRIGL SERPL-MCNC: 84 MG/DL — SIGNIFICANT CHANGE UP
WBC # BLD: 4.28 K/UL — LOW (ref 4.8–10.8)
WBC # FLD AUTO: 4.28 K/UL — LOW (ref 4.8–10.8)

## 2023-03-19 PROCEDURE — 93018 CV STRESS TEST I&R ONLY: CPT

## 2023-03-19 PROCEDURE — 99232 SBSQ HOSP IP/OBS MODERATE 35: CPT

## 2023-03-19 PROCEDURE — 93016 CV STRESS TEST SUPVJ ONLY: CPT

## 2023-03-19 PROCEDURE — 78452 HT MUSCLE IMAGE SPECT MULT: CPT | Mod: 26

## 2023-03-19 RX ORDER — SODIUM CHLORIDE 9 MG/ML
1000 INJECTION INTRAMUSCULAR; INTRAVENOUS; SUBCUTANEOUS
Refills: 0 | Status: DISCONTINUED | OUTPATIENT
Start: 2023-03-20 | End: 2023-03-23

## 2023-03-19 RX ORDER — CARVEDILOL PHOSPHATE 80 MG/1
6.25 CAPSULE, EXTENDED RELEASE ORAL EVERY 12 HOURS
Refills: 0 | Status: DISCONTINUED | OUTPATIENT
Start: 2023-03-19 | End: 2023-03-22

## 2023-03-19 RX ORDER — ENOXAPARIN SODIUM 100 MG/ML
40 INJECTION SUBCUTANEOUS EVERY 24 HOURS
Refills: 0 | Status: DISCONTINUED | OUTPATIENT
Start: 2023-03-19 | End: 2023-03-22

## 2023-03-19 RX ADMIN — Medication 81 MILLIGRAM(S): at 11:46

## 2023-03-19 RX ADMIN — ALBUTEROL 2 PUFF(S): 90 AEROSOL, METERED ORAL at 13:14

## 2023-03-19 RX ADMIN — ENOXAPARIN SODIUM 40 MILLIGRAM(S): 100 INJECTION SUBCUTANEOUS at 23:00

## 2023-03-19 RX ADMIN — CARVEDILOL PHOSPHATE 6.25 MILLIGRAM(S): 80 CAPSULE, EXTENDED RELEASE ORAL at 13:13

## 2023-03-19 RX ADMIN — PANTOPRAZOLE SODIUM 40 MILLIGRAM(S): 20 TABLET, DELAYED RELEASE ORAL at 05:09

## 2023-03-19 RX ADMIN — ATORVASTATIN CALCIUM 40 MILLIGRAM(S): 80 TABLET, FILM COATED ORAL at 23:00

## 2023-03-19 RX ADMIN — PANTOPRAZOLE SODIUM 40 MILLIGRAM(S): 20 TABLET, DELAYED RELEASE ORAL at 17:44

## 2023-03-19 RX ADMIN — AMLODIPINE BESYLATE 10 MILLIGRAM(S): 2.5 TABLET ORAL at 05:09

## 2023-03-19 RX ADMIN — ALBUTEROL 2 PUFF(S): 90 AEROSOL, METERED ORAL at 20:00

## 2023-03-19 RX ADMIN — REGADENOSON 0.4 MILLIGRAM(S): 0.08 INJECTION, SOLUTION INTRAVENOUS at 09:47

## 2023-03-19 NOTE — CONSULT NOTE ADULT - SUBJECTIVE AND OBJECTIVE BOX
HPI:  63 y/o female with PMHx of HTN, HLD, COPD not on home O2, pre-DM, active smoker presented to the ED for chest tightness since 9am . She reports after breakfast she sat on the couch watching tv, when she suddenly developed substernal chest tightness with SOB and flushing. She got up to get her inhalers when she became dizzy prompting her ED visit yesterday. She described the chest tightness as non-radiating, substernal, improves on ambulation, worse on rest. She denies any similar episodes in the past. Denies any fevers, chills, abdominal pain, nausea, vomiting, diarrhea, constipation, LE swelling. Never had stress test before, no cardiac cath done. FH+ father  from MI at age 60.  In the ED, VS noted for T 36.4, HR 70, /77, RR 17, SpO2 98%. Labs noted for WBC 5, Hg 12, plt 187, K 4.3, Cr 0.9, Glu 96. Trop <0.01 x2. EKG showed sinus with PVCS and nonspecific T wave abnormality. Placed in ED-obs for CCTA. CCTA showed high calcium score. Admitted for nuclear stress test. Currently symptom free.  (18 Mar 2023 14:58)      Cardiology consulted for positive stress test. patient stress showed Moderate to large severe reversible defect involving the inferior   wall of the left ventricle consistent with ischemia.    PAST MEDICAL & SURGICAL HISTORY  Hypertension    High cholesterol        FAMILY HISTORY:  FAMILY HISTORY:  FH: myocardial infarction (Father)        SOCIAL HISTORY:  x]smoker  []Alcohol  []Drug    ALLERGIES:  No Known Allergies      MEDICATIONS:  MEDICATIONS  (STANDING):  albuterol    90 MICROgram(s) HFA Inhaler 2 Puff(s) Inhalation every 6 hours  amLODIPine   Tablet 10 milliGRAM(s) Oral daily  aspirin enteric coated 81 milliGRAM(s) Oral daily  atorvastatin 40 milliGRAM(s) Oral at bedtime  carvedilol 6.25 milliGRAM(s) Oral every 12 hours  enoxaparin Injectable 40 milliGRAM(s) SubCutaneous every 24 hours  pantoprazole    Tablet 40 milliGRAM(s) Oral every 12 hours  tiotropium 2.5 MICROgram(s) Inhaler 2 Puff(s) Inhalation daily    MEDICATIONS  (PRN):      HOME MEDICATIONS:  Home Medications:  Albuterol (Eqv-Ventolin HFA) 90 mcg/inh inhalation aerosol: 2 puff(s) inhaled every 6 hours (18 Mar 2023 15:42)  amLODIPine 10 mg oral tablet: 1 tab(s) orally once a day (18 Mar 2023 15:42)  Aspir 81 oral delayed release tablet: 1 tab(s) orally once a day (18 Mar 2023 15:42)  atorvastatin 40 mg oral tablet: 1 tab(s) orally once a day (18 Mar 2023 15:42)  carvedilol 6.25 mg oral tablet: 1 tab(s) orally 2 times a day (18 Mar 2023 15:41)  Combivent Respimat 20 mcg-100 mcg/inh inhalation aerosol: 1 puff(s) inhaled 4 times a day (18 Mar 2023 15:41)      VITALS:   T(F): 97.8 ( @ 15:28), Max: 99 (-17 @ 10:10)  HR: 65 ( @ 15:28) (65 - 87)  BP: 146/67 ( @ 15:28) (111/67 - 159/90)  BP(mean): --  RR: 18 ( @ 15:28) (17 - 20)  SpO2: 98% ( @ 15:28) (97% - 99%)    I&O's Summary      REVIEW OF SYSTEMS:  CONSTITUTIONAL: No weakness, fevers or chills  EYES: No visual changes  ENT: No vertigo or throat pain   NECK: No pain or stiffness  RESPIRATORY: No cough, wheezing, hemoptysis; No shortness of breath  CARDIOVASCULAR: Positive for chest pain  GASTROINTESTINAL: No abdominal or epigastric pain. No nausea, vomiting, or hematemesis; No diarrhea or constipation. No melena or hematochezia.  GENITOURINARY: No dysuria, frequency or hematuria  NEUROLOGICAL: No numbness or weakness  SKIN: No itching, no rashes  MSK: No pain    PHYSICAL EXAM:  NEURO: patient is awake , alert and oriented  GEN: Not in acute distress  NECK: no thyroid enlargement, no JVD  LUNGS: Clear to auscultation bilaterally   CARDIOVASCULAR: S1/S2 present, RRR , no murmurs or rubs, no carotid bruits,  + PP bilaterally  ABD: Soft, non-tender, non-distended, +BS  EXT: No UBALDO  SKIN: Intact    LABS:                        12.1   4.28  )-----------( 168      ( 19 Mar 2023 06:45 )             37.3         145  |  109  |  12  ----------------------------<  102<H>  4.8   |  26  |  0.7    Ca    9.5      19 Mar 2023 06:45                Troponin trend:       Chol 102 LDL -- HDL 37<L> Trig 84      RADIOLOGY:  < from: NM Nuclear Stress Pharmacologic Multiple (23 @ 10:25) >    Impression:  1.  Moderate to large severe reversible defect involving the inferior   wall of the left ventricle consistent with ischemia.  2.  Normal left ventricular wall motion and wall thickening.  3.  Left ventricular ejection fraction of 69% which is within the range   of normal.    --- End of Report ---        < end of copied text >        -TTE:      ECG:    < from: 12 Lead ECG (23 @ 17:54) >   Sinus bradycardia  Nonspecific T wave abnormality  Abnormal ECG    < end of copied text >

## 2023-03-19 NOTE — CONSULT NOTE ADULT - ASSESSMENT
63 y/o female with PMHx of HTN, HLD, COPD not on home O2, pre-DM, active smoker presented to the ED for chest tightness. In the ED, VS noted for T 36.4, HR 70, /77, RR 17, SpO2 98%. Labs noted for WBC 5, Hg 12, plt 187, K 4.3, Cr 0.9, Glu 96. Trop <0.01 x2. EKG showed sinus with PVCS and nonspecific T wave abnormality. Placed in ED-obs for CCTA. CCTA showed high calcium score. patient stress showed Moderate to large severe reversible defect involving the inferior wall of the left ventricle consistent with ischemia. cardiology consulted for further care.       Impression   -Chest pain likely cardiac given positive stress test  -HTN/ HLD  -COPD   -Active smoker  --------------------------------------------  EKG Sinus bradycardia   Calcium score 1550  NM stress  Moderate to large severe reversible defect involving the inferior wall of the left ventricle consistent with ischemia.     Recommendations   -Telemetry monitor   -2d ECHO   -cont with aspirin, Lipitor   -cont with coreg and amlodipine   -NPO after midnight for Cath in am

## 2023-03-19 NOTE — PROGRESS NOTE ADULT - SUBJECTIVE AND OBJECTIVE BOX
JENNIFER TORRES  62y Female    Patient is a 62y old  Female who presents with a chief complaint of chest pain.    INTERVAL HPI/OVERNIGHT EVENTS:    ROS: Pt denies further chest pain, fever, chills, SOB, palpitations, nausea, vomiting, abdominal pain, dysuria, diarrhea, constipation, rash, muscle or joint pain.    Overnight events: No acute events overnight.    Vital Signs Last 24 Hrs  T(C): 36.9 (19 Mar 2023 07:31), Max: 36.9 (19 Mar 2023 07:31)  T(F): 98.4 (19 Mar 2023 07:31), Max: 98.4 (19 Mar 2023 07:31)  HR: 77 (19 Mar 2023 07:31) (69 - 77)  BP: 146/62 (19 Mar 2023 07:31) (118/60 - 146/62)  BP(mean): --  RR: 18 (19 Mar 2023 07:31) (18 - 18)  SpO2: 98% (19 Mar 2023 07:31) (98% - 98%)    Parameters below as of 19 Mar 2023 07:31  Patient On (Oxygen Delivery Method): room air    No Known Allergies    MEDICATIONS  (STANDING):  albuterol    90 MICROgram(s) HFA Inhaler 2 Puff(s) Inhalation every 6 hours  amLODIPine   Tablet 10 milliGRAM(s) Oral daily  aspirin enteric coated 81 milliGRAM(s) Oral daily  atorvastatin 40 milliGRAM(s) Oral at bedtime  enoxaparin Injectable 40 milliGRAM(s) SubCutaneous every 24 hours  pantoprazole    Tablet 40 milliGRAM(s) Oral every 12 hours  regadenoson Injectable 0.4 milliGRAM(s) IV Push once  tiotropium 2.5 MICROgram(s) Inhaler 2 Puff(s) Inhalation daily    MEDICATIONS  (PRN):      PHYSICAL EXAM:  General Appearance: NAD, normal for age and gender. 	  Neck: Normal JVP, no bruit.   Eyes: Conjunctiva clear, Extra Ocular muscles intact. No scleral icterus.  ENMT: Moist oral mucosa. No oral lesion.  Cardiovascular: Regular rate and rhythm S1 S2, No JVD, No murmurs.  Respiratory: Lungs clear to auscultation. No wheezes, rales or rhonchi.  Psychiatry: Alert and oriented x 3, Mood & affect appropriate.  Gastrointestinal:  Soft, Non-tender, Non-distended.  Neurologic: Non-focal deficits.  Musculoskeletal/ extremities: Normal range of motion, No clubbing, cyanosis or edema.  Vascular: Peripheral pulses palpable bilaterally.  Skin/Integumen: No rashes, No ecchymoses, No cyanosis.    Telemetry: No events. PVCs.    LABS:                        12.1   4.28  )-----------( 168      ( 19 Mar 2023 06:45 )             37.3     03-19    145  |  109  |  12  ----------------------------<  102<H>  4.8   |  26  |  0.7    Ca    9.5      19 Mar 2023 06:45    RADIOLOGY & ADDITIONAL TESTS:  < from: [facility] Duplex Lower Ext Vein Scan, Dilcia (03.18.23 @ 19:22) >  IMPRESSION:  No evidence of deep venous thrombosis in either lower extremity.

## 2023-03-19 NOTE — CONSULT NOTE ADULT - ATTENDING COMMENTS
Agree with above  61 y/o female with PMHx of HTN, HLD, COPD not on home O2, pre-DM, active smoker presented to the ED for chest tightness.  angina likely ischemic  Positive nuclear stress test   elevated Ca score in CCTA  currently stable  ASA statin BB, CCB  TTE pending   plan for cardiac cath

## 2023-03-20 LAB
A1C WITH ESTIMATED AVERAGE GLUCOSE RESULT: 6.8 % — HIGH (ref 4–5.6)
ANION GAP SERPL CALC-SCNC: 10 MMOL/L — SIGNIFICANT CHANGE UP (ref 7–14)
APTT BLD: 33.2 SEC — SIGNIFICANT CHANGE UP (ref 27–39.2)
BUN SERPL-MCNC: 10 MG/DL — SIGNIFICANT CHANGE UP (ref 10–20)
CALCIUM SERPL-MCNC: 9.7 MG/DL — SIGNIFICANT CHANGE UP (ref 8.4–10.5)
CHLORIDE SERPL-SCNC: 102 MMOL/L — SIGNIFICANT CHANGE UP (ref 98–110)
CO2 SERPL-SCNC: 28 MMOL/L — SIGNIFICANT CHANGE UP (ref 17–32)
CREAT SERPL-MCNC: 0.6 MG/DL — LOW (ref 0.7–1.5)
EGFR: 100 ML/MIN/1.73M2 — SIGNIFICANT CHANGE UP
ESTIMATED AVERAGE GLUCOSE: 148 MG/DL — HIGH (ref 68–114)
GLUCOSE BLDC GLUCOMTR-MCNC: 150 MG/DL — HIGH (ref 70–99)
GLUCOSE BLDC GLUCOMTR-MCNC: 95 MG/DL — SIGNIFICANT CHANGE UP (ref 70–99)
GLUCOSE SERPL-MCNC: 111 MG/DL — HIGH (ref 70–99)
HCT VFR BLD CALC: 37.6 % — SIGNIFICANT CHANGE UP (ref 37–47)
HCV AB S/CO SERPL IA: 0.05 COI — SIGNIFICANT CHANGE UP
HCV AB SERPL-IMP: SIGNIFICANT CHANGE UP
HGB BLD-MCNC: 12.3 G/DL — SIGNIFICANT CHANGE UP (ref 12–16)
INR BLD: 1.08 RATIO — SIGNIFICANT CHANGE UP (ref 0.65–1.3)
MAGNESIUM SERPL-MCNC: 1.7 MG/DL — LOW (ref 1.8–2.4)
MCHC RBC-ENTMCNC: 30.7 PG — SIGNIFICANT CHANGE UP (ref 27–31)
MCHC RBC-ENTMCNC: 32.7 G/DL — SIGNIFICANT CHANGE UP (ref 32–37)
MCV RBC AUTO: 93.8 FL — SIGNIFICANT CHANGE UP (ref 81–99)
NRBC # BLD: 0 /100 WBCS — SIGNIFICANT CHANGE UP (ref 0–0)
PLATELET # BLD AUTO: 178 K/UL — SIGNIFICANT CHANGE UP (ref 130–400)
POTASSIUM SERPL-MCNC: 4.1 MMOL/L — SIGNIFICANT CHANGE UP (ref 3.5–5)
POTASSIUM SERPL-SCNC: 4.1 MMOL/L — SIGNIFICANT CHANGE UP (ref 3.5–5)
PROTHROM AB SERPL-ACNC: 12.4 SEC — SIGNIFICANT CHANGE UP (ref 9.95–12.87)
RBC # BLD: 4.01 M/UL — LOW (ref 4.2–5.4)
RBC # FLD: 13.7 % — SIGNIFICANT CHANGE UP (ref 11.5–14.5)
SODIUM SERPL-SCNC: 140 MMOL/L — SIGNIFICANT CHANGE UP (ref 135–146)
WBC # BLD: 4.46 K/UL — LOW (ref 4.8–10.8)
WBC # FLD AUTO: 4.46 K/UL — LOW (ref 4.8–10.8)

## 2023-03-20 PROCEDURE — 99222 1ST HOSP IP/OBS MODERATE 55: CPT

## 2023-03-20 PROCEDURE — 93458 L HRT ARTERY/VENTRICLE ANGIO: CPT | Mod: 26

## 2023-03-20 PROCEDURE — 99222 1ST HOSP IP/OBS MODERATE 55: CPT | Mod: 25

## 2023-03-20 PROCEDURE — 99233 SBSQ HOSP IP/OBS HIGH 50: CPT

## 2023-03-20 PROCEDURE — 93306 TTE W/DOPPLER COMPLETE: CPT | Mod: 26

## 2023-03-20 RX ORDER — BNT162B2 ORIGINAL AND OMICRON BA.4/BA.5 .1125; .1125 MG/2.25ML; MG/2.25ML
0.3 INJECTION, SUSPENSION INTRAMUSCULAR ONCE
Refills: 0 | Status: DISCONTINUED | OUTPATIENT
Start: 2023-03-20 | End: 2023-03-23

## 2023-03-20 RX ADMIN — CARVEDILOL PHOSPHATE 6.25 MILLIGRAM(S): 80 CAPSULE, EXTENDED RELEASE ORAL at 17:41

## 2023-03-20 RX ADMIN — ALBUTEROL 2 PUFF(S): 90 AEROSOL, METERED ORAL at 20:16

## 2023-03-20 RX ADMIN — Medication 81 MILLIGRAM(S): at 11:43

## 2023-03-20 RX ADMIN — ALBUTEROL 2 PUFF(S): 90 AEROSOL, METERED ORAL at 11:44

## 2023-03-20 RX ADMIN — ALBUTEROL 2 PUFF(S): 90 AEROSOL, METERED ORAL at 02:00

## 2023-03-20 RX ADMIN — AMLODIPINE BESYLATE 10 MILLIGRAM(S): 2.5 TABLET ORAL at 06:15

## 2023-03-20 RX ADMIN — ATORVASTATIN CALCIUM 40 MILLIGRAM(S): 80 TABLET, FILM COATED ORAL at 23:03

## 2023-03-20 RX ADMIN — CARVEDILOL PHOSPHATE 6.25 MILLIGRAM(S): 80 CAPSULE, EXTENDED RELEASE ORAL at 06:15

## 2023-03-20 RX ADMIN — PANTOPRAZOLE SODIUM 40 MILLIGRAM(S): 20 TABLET, DELAYED RELEASE ORAL at 06:15

## 2023-03-20 RX ADMIN — PANTOPRAZOLE SODIUM 40 MILLIGRAM(S): 20 TABLET, DELAYED RELEASE ORAL at 17:41

## 2023-03-20 NOTE — CHART NOTE - NSCHARTNOTEFT_GEN_A_CORE
PRE-OP DIAGNOSIS:  Chest pain    PROCEDURE:   [x ] Coronary Angiogram   [x ] LHC   [ ] LVG   [ ] RHC   [] Intervention (see below)       PHYSICIAN:  Dr. Santa  FELLOW: Lilly    PROCEDURE DESCRIPTION:     Consent:    [x] Patient   [] Family Member   []  Used      Anesthesia:   [ ] General   [X] Sedation   [X] Local     Access & Closure:   [x ] 6  Fr R   Radial Artery  -> D-stat     IV Contrast: 60 mL      Intervention: none    Implants: none     FINDINGS:   Coronary Dominance: right  LM: Calcified. 50% distal stenosis.   LAD:  Calcified. 80% mLAD bifurcation stenosis with ostial D1 stenosis. Moderate disease in remainder of vessel.  CX:  Calcified. 90% ostial stenosis. Moderate disease in remainder of vessel.  RCA:  Calcified. 60% mRCA stenosis. Severe RPDA disease. 80% RPL stenosis.     LVEDP:  14 mmHg     EF: 60 % on TTE    ESTIMATED BLOOD LOSS: < 10 mL      CONDITION:   [x] Good   [ ] Fair   [ ] Critical     SPECIMEN REMOVED: N/A     POST-OP DIAGNOSIS:    [ ] Normal Coronary Angiogram   [ ] Mild Coronary Artery Disease (< 50% stenosis)   [ x] 3- Vessel Coronary Artery Disease. SYNTAX 27     PLAN OF CARE:   [ ] D/C Home Today   [x ] Return to In-patient bed   [ ] Admit for observation   [ ] Return for Staged Procedure in 4-6 weeks   [x ] CT Surgery Consult   [X] Medications: ASA,  statin  [X] IV Fluids: NS @ 100cc/h x 6 hours  [ ] EP Consult  [x] Remove D-stat and Hold manual pressure if signs of hematoma or bleeding over radial access site.  [x] Smoking cessation

## 2023-03-20 NOTE — PROGRESS NOTE ADULT - SUBJECTIVE AND OBJECTIVE BOX
Seen in Pre-cath area    No chest pain/sob/dizziness    Results of nuclear stress test reviewed    Impression:  1.  Moderate to large severe reversible defect involving the inferior   wall of the left ventricle consistent with ischemia.  2.  Normal left ventricular wall motion and wall thickening.  3.  Left ventricular ejection fraction of 69% which is within the range   of normal.    PAST MEDICAL & SURGICAL HISTORY:  Hypertension  High cholesterol    MEDICATIONS:  MEDICATIONS  (STANDING):  albuterol    90 MICROgram(s) HFA Inhaler 2 Puff(s) Inhalation every 6 hours  amLODIPine   Tablet 10 milliGRAM(s) Oral daily  aspirin enteric coated 81 milliGRAM(s) Oral daily  atorvastatin 40 milliGRAM(s) Oral at bedtime  carvedilol 6.25 milliGRAM(s) Oral every 12 hours  enoxaparin Injectable 40 milliGRAM(s) SubCutaneous every 24 hours  pantoprazole    Tablet 40 milliGRAM(s) Oral every 12 hours  sodium chloride 0.9%. 1000 milliLiter(s) (75 mL/Hr) IV Continuous <Continuous>  tiotropium 2.5 MICROgram(s) Inhaler 2 Puff(s) Inhalation daily    Allergy: No Known Allergies      VITALS:  T(F): 97 (03-20-23 @ 07:50), Max: 97.8 (03-19-23 @ 15:28)  HR: 70 (03-20-23 @ 07:50)  BP: 117/65 (03-20-23 @ 07:50) (117/65 - 146/67)  RR: 18 (03-20-23 @ 07:50)  SpO2: 98% (03-20-23 @ 07:50)    PULM: CTA B/L  CV: RRR S1S2  NEURO: 5/5 B/L UPPER EXTR.  PSYCH: A X O X 3                            12.3   4.46  )-----------( 178      ( 20 Mar 2023 07:26 )             37.6     PT/INR - ( 20 Mar 2023 07:26 )   PT: 12.40 sec;   INR: 1.08 ratio         PTT - ( 20 Mar 2023 07:26 )  PTT:33.2 sec  03-20    140  |  102  |  10  ----------------------------<  111<H>  4.1   |  28  |  0.6<L>    Ca    9.7      20 Mar 2023 07:26  Mg     1.7     03-20    A/P    SUSPECTED CORONARY ARTERY DISEASE  HTN  HYPERCHOLESTEROLEMIA  STAGE 1 OBESITY  HYPOMAGNESEMIA  -CATH PENDING  -CONT. ALL CURRENT RX  -ORAL MG SUPPLEMENT  -PLAN DEPENDENT ON CATH RESULTS  -CONT. TELE MONITORING  -DIETARY FOLLOW UP WITH PMD IN [facility]                                  Case discussed in details with:

## 2023-03-20 NOTE — PATIENT PROFILE ADULT - FALL HARM RISK - HARM RISK INTERVENTIONS
Assistance with ambulation/Assistance OOB with selected safe patient handling equipment/Communicate Risk of Fall with Harm to all staff/Monitor gait and stability/Reinforce activity limits and safety measures with patient and family/Sit up slowly, dangle for a short time, stand at bedside before walking/Tailored Fall Risk Interventions/Visual Cue: Yellow wristband and red socks/Bed in lowest position, wheels locked, appropriate side rails in place/Call bell, personal items and telephone in reach/Instruct patient to call for assistance before getting out of bed or chair/Non-slip footwear when patient is out of bed/[place] to call system/Physically safe environment - no spills, clutter or unnecessary equipment/Purposeful Proactive Rounding/Room/bathroom lighting operational, light cord in reach

## 2023-03-20 NOTE — CONSULT NOTE ADULT - SUBJECTIVE AND OBJECTIVE BOX
Surgeon: Dr. Moore/Devan/Dot    Consult requesting by:    HISTORY OF PRESENT ILLNESS:  HPI:  63 y/o female with PMHx of HTN, HLD, COPD not on home O2, pre-DM, active smoker presented to the ED for chest tightness since 9am yesterday. She reports after breakfast she sat on the couch watching tv, when she suddenly developed substernal chest tightness with SOB and flushing. She got up to get her inhalers when she became dizzy prompting her ED visit yesterday. She described the chest tightness as non-radiating, substernal, improves on ambulation, worse on rest. She denies any similar episodes in the past. Denies any fevers, chills, abdominal pain, nausea, vomiting, diarrhea, constipation, LE swelling. Never had stress test before, no cardiac cath done. FH+ father  from MI at age 60.    In the ED, VS noted for T 36.4, HR 70, /77, RR 17, SpO2 98%. Labs noted for WBC 5, Hg 12, plt 187, K 4.3, Cr 0.9, Glu 96. Trop <0.01 x2. EKG showed sinus with PVCS and nonspecific T wave abnormality. Placed in ED-obs for CCTA. CCTA showed high calcium score. Admitted for nuclear stress test. Currently symptom free.  (18 Mar 2023 14:58)      NYHA functional class    [ ] Class I (no limitation) [ ] Class II (slight limitation) [ ] Class III (marked limitation) [ ] Class IV (symptoms at rest)    PAST MEDICAL & SURGICAL HISTORY:  Hypertension      High cholesterol          MEDICATIONS  (STANDING):  albuterol    90 MICROgram(s) HFA Inhaler 2 Puff(s) Inhalation every 6 hours  amLODIPine   Tablet 10 milliGRAM(s) Oral daily  aspirin enteric coated 81 milliGRAM(s) Oral daily  atorvastatin 40 milliGRAM(s) Oral at bedtime  carvedilol 6.25 milliGRAM(s) Oral every 12 hours  enoxaparin Injectable 40 milliGRAM(s) SubCutaneous every 24 hours  pantoprazole    Tablet 40 milliGRAM(s) Oral every 12 hours  sodium chloride 0.9%. 1000 milliLiter(s) (75 mL/Hr) IV Continuous <Continuous>  tiotropium 2.5 MICROgram(s) Inhaler 2 Puff(s) Inhalation daily    MEDICATIONS  (PRN):    Antiplatelet therapy:                           Last dose/amt:    Home Medications:  Albuterol (Eqv-Ventolin HFA) 90 mcg/inh inhalation aerosol: 2 puff(s) inhaled every 6 hours (18 Mar 2023 15:42)  amLODIPine 10 mg oral tablet: 1 tab(s) orally once a day (18 Mar 2023 15:42)  Aspir 81 oral delayed release tablet: 1 tab(s) orally once a day (18 Mar 2023 15:42)  atorvastatin 40 mg oral tablet: 1 tab(s) orally once a day (18 Mar 2023 15:42)  carvedilol 6.25 mg oral tablet: 1 tab(s) orally 2 times a day (18 Mar 2023 15:41)  Combivent Respimat 20 mcg-100 mcg/inh inhalation aerosol: 1 puff(s) inhaled 4 times a day (18 Mar 2023 15:41)      Allergies    No Known Allergies    Intolerances        SOCIAL HISTORY:  Smoker: [ ] Yes  [ ] No        PACK YEARS:                         WHEN QUIT?  ETOH use: [ ] Yes  [ ] No              FREQUENCY / QUANTITY:  Illicit Drug use:  [ ] Yes  [ ] No  Occupation:  Lives with:  Assisted device use:  5 meter walk test: 1____sec, 2____sec, 3___sec  FAMILY HISTORY:  FH: myocardial infarction (Father)        Review of Systems  CONSTITUTIONAL:  Fevers[ ] chills[ ] sweats[ ] fatigue[ ] weight loss[ ] weight gain [ ]                                     NEGATIVE [X ]   NEURO:  paresthesias[ ] seizures [ ]  syncope [ ]  confusion [ ]                                                                                NEGATIVE[ X]   EYES: glasses[ ]  blurry vision[ ]  discharge[ ] pain[ ] glaucoma [ ]                                                                          NEGATIVE[X ]   ENMT:  difficulty hearing [ ]  vertigo[ ]  dysphagia[ ] epistaxis[ ] recent dental work [ ]                                    NEGATIVE[ X]   CV:  chest pain[ ] palpitations[ ] COOL [ ] diaphoresis [ ]                                                                                           NEGATIVE[ X]   RESPIRATORY:  wheezing[ ] SOB[ ] cough [ ] sputum[ ] hemoptysis[ ]                                                                  NEGATIVE[ ]   GI:  nausea[ ]  vomiting [ ]  diarrhea[ ] constipation [ ] melena [ ]                                                                         NEGATIVE[ X]   : hematuria[ ]  dysuria[ ] urgency[ ] incontinence[ ]                                                                                            NEGATIVE[ X]   MUSKULOSKELETAL:  arthritis[ ]  joint swelling [ ] muscle weakness [ ] Hx vein stripping [ ]                             NEGATIVE[X ]   SKIN/BREAST:  rash[ ] itching [ ]  hair loss[ ] masses[ ]                                                                                              NEGATIVE[ X]   PSYCH:  dementia [ ] depression [ ] anxiety[ ]                                                                                                               NEGATIVE[X ]   HEME/LYMPH:  bruises easily[ ] enlarged lymph nodes[ ] tender lymph nodes[ ]                                               NEGATIVE[ X]   ENDOCRINE:  cold intolerance[ ] heat intolerance[ ] polydipsia[ ]                                                                          NEGATIVE[ X]     PHYSICAL EXAM  Vital Signs Last 24 Hrs  T(C): 36.1 (20 Mar 2023 07:50), Max: 36.4 (20 Mar 2023 00:26)  T(F): 97 (20 Mar 2023 07:50), Max: 97.6 (20 Mar 2023 00:26)  HR: 70 (20 Mar 2023 07:50) (70 - 78)  BP: 117/65 (20 Mar 2023 07:50) (117/65 - 122/58)  BP(mean): --  RR: 18 (20 Mar 2023 07:50) (18 - 18)  SpO2: 98% (20 Mar 2023 07:50) (98% - 99%)      Right arm bp:                                 Left arm bp;    CONSTITUTIONAL:  WNL[ ]   Neuro: WNL [ ] Normal exam oriented to person/place & time with no focal motor or sensory  deficits. Other                     Eyes:    WNL [ ] Normal exam of conjunctiva & lids, pupils equally reactive. Other     ENT:     WNL [ ] Normal exam of nasal/oral mucosa with absence of cyanosis. Other  Neck:   WNL [ ] Normal exam of jugular veins, trachea & thyroid. Other  Chest:  WNL [ ] Normal lung exam with good air movement absence of wheezes, rales, or rhonchi: Other                                                                                CV:  Auscultation: normal [ ] S3[ ] S4[ ] Irregular [ ] Rub[ ] Clicks[ ]    Murmurs none:[ ]systolic [ ]  diastolic [ ] holosystolic [ ]  Carotids: No Bruits[ ] Other                  Abdominal Aorta: normal [ ] nonpalpable[ ]Other                                                                                      GI: WNL[ ] Normal exam of abdomen, liver & spleen with no noted masses or tenderness. Other                                                                                                        Extremities: WNL[ ] Normal no evidence of cyanosis or deformity Edema: none[ ]trace[ ]1+[ ]2+[ ]3+[ ]4+[ ]  Lower Extremity Pulses: Right[ ] Left[ ]Varicosities[ ]  SKIN :WNL[ ] Normal exam to inspection & palpation. Other:                                                          LABS:                        12.3   4.46  )-----------( 178      ( 20 Mar 2023 07:26 )             37.6     03-20    140  |  102  |  10  ----------------------------<  111<H>  4.1   |  28  |  0.6<L>    Ca    9.7      20 Mar 2023 07:26  Mg     1.7     03-20      PT/INR - ( 20 Mar 2023 07:26 )   PT: 12.40 sec;   INR: 1.08 ratio         PTT - ( 20 Mar 2023 07:26 )  PTT:33.2 sec            COVID Result: COVID-19 PCR: NotDetec (23 @ 16:51)      Cardiac Cath:    TTE / JUAN:    STS Score:     Impression:  CAD [ ]  Valvular  disease [ ]   Aortic Disease [ ]   LUIS A: Yes[ ] No [ ]   CKD Stage I [ ] , Stage II [ ] , Stage III [ ], Stage IV [ ]   Anemia: Yes [ ], No [ ]  Diabetes :Yes [ ], No [ ]  Acute MI: Yes [ ], No [ ]   Heart Failure: Yes [ ] , No [ ] HFpEF [ ], HFrEF [ ]      Assessment/ Plan: 62y Female with...  -Case and plan discussed with CT surgeon Dr. Moore/Devan/Dot. Initial STS risk assessed and discussed with patient. Evaluation by full heart team pending. Attending note to follow. Pre-op for:     Recommendations:  [] hold Plavix  [] hold ASA if Pre-op Cardiac Valve surgery and patient without CAD  [] hold ACEI/ARB/CCB 24 hours prior to planned procedure   [] LUE/RUE precaution for possible radial artery harvest      Labs:  [] CBC  [] CMP  [] PT/INR/PTT  [] BNP  [] HgA1c  [] Type and screen  [] Urinalysis  [] MRSA  [] COVID pcr    Diagnostic studies  [] CT HEAD Non-Contrast  [] CT Chest without/with contrast   [] Carotid Duplex  [] PFT: Simple PFT [ ]  Full [ ]  [] LARRY/PVR  [] TTE    Consultations/Evaluations   [] Renal Consult  [] Pulmonary Consult  [] Vascular Consult  [] Dental Consult   [] Hem-Onc Consult   [] GI Consult   [] Other Consultations :                 Surgeon: Dr. Chris    Consult requesting by: MD Santa    HISTORY OF PRESENT ILLNESS:    63 y/o female with PMHx of HTN, HLD, COPD not on home O2, pre-DM, active smoker presented to the ED for chest tightness since 9am yesterday. She reports after breakfast she sat on the couch watching tv, when she suddenly developed substernal chest tightness with SOB and flushing. She got up to get her inhalers when she became dizzy prompting her ED visit yesterday. She described the chest tightness as non-radiating, substernal, improves on ambulation, worse on rest. She denies any similar episodes in the past. Denies any fevers, chills, abdominal pain, nausea, vomiting, diarrhea, constipation, LE swelling. Never had stress test before, no cardiac cath done. FH+ father  from MI at age 58. Today, patient underwent cardiac cath via right wrist which revealed left main disease, calcified LAD (80%), CX (90%), & RCA (60%)m and severe RPDA disease. CT surgery was consulted for possible myocardial revascularization via CABG.       NYHA functional class    [ ] Class I (no limitation) [ ] Class II (slight limitation) [ ] Class III (marked limitation) [ ] Class IV (symptoms at rest)    PAST MEDICAL & SURGICAL HISTORY:  Hypertension      High cholesterol          MEDICATIONS  (STANDING):  albuterol    90 MICROgram(s) HFA Inhaler 2 Puff(s) Inhalation every 6 hours  amLODIPine   Tablet 10 milliGRAM(s) Oral daily  aspirin enteric coated 81 milliGRAM(s) Oral daily  atorvastatin 40 milliGRAM(s) Oral at bedtime  carvedilol 6.25 milliGRAM(s) Oral every 12 hours  enoxaparin Injectable 40 milliGRAM(s) SubCutaneous every 24 hours  pantoprazole    Tablet 40 milliGRAM(s) Oral every 12 hours  sodium chloride 0.9%. 1000 milliLiter(s) (75 mL/Hr) IV Continuous <Continuous>  tiotropium 2.5 MICROgram(s) Inhaler 2 Puff(s) Inhalation daily    MEDICATIONS  (PRN):    Antiplatelet therapy: ASA                    Last dose/amt: 81mg     Home Medications:  Albuterol (Eqv-Ventolin HFA) 90 mcg/inh inhalation aerosol: 2 puff(s) inhaled every 6 hours (18 Mar 2023 15:42)  amLODIPine 10 mg oral tablet: 1 tab(s) orally once a day (18 Mar 2023 15:42)  Aspir 81 oral delayed release tablet: 1 tab(s) orally once a day (18 Mar 2023 15:42)  atorvastatin 40 mg oral tablet: 1 tab(s) orally once a day (18 Mar 2023 15:42)  carvedilol 6.25 mg oral tablet: 1 tab(s) orally 2 times a day (18 Mar 2023 15:41)  Combivent Respimat 20 mcg-100 mcg/inh inhalation aerosol: 1 puff(s) inhaled 4 times a day (18 Mar 2023 15:41)      Allergies    No Known Allergies    Intolerances        SOCIAL HISTORY:  Smoker: [X] Yes  [ ] No        PACK YEARS: 1/2ppd on/off for 17 years                        ETOH use: [ ] Yes  [X} No             FREQUENCY / QUANTITY:  Illicit Drug use:  [ ] Yes  [X] No  Occupation: retired, house keeper  Lives with: alone, apartment [address]th floor  Assisted device use: none  5 meter walk test: 1___4sec, 2___4sec, 3__4sec    FAMILY HISTORY:  FH: myocardial infarction (Father)  Mother: Diabetes, HTN ()  Sister: Breast Cancer (currently alive)      Review of Systems  CONSTITUTIONAL:  Fevers[ ] chills[ ] sweats[ ] fatigue[ ] weight loss[ ] weight gain [ ]                                     NEGATIVE [X ]   NEURO:  paresthesias[ ] seizures [ ]  syncope [ ]  confusion [ ]                                                                                NEGATIVE[ X]   EYES: glasses[ ]  blurry vision[ ]  discharge[ ] pain[ ] glaucoma [ ]                                                                          NEGATIVE[X ]   ENMT:  difficulty hearing [ ]  vertigo[ ]  dysphagia[ ] epistaxis[ ] recent dental work [ ]                                    NEGATIVE[ X]   CV:  chest pain[ ] palpitations[ ] COOL [ ] diaphoresis [ ]                                                                                           NEGATIVE[ X]   RESPIRATORY:  wheezing[ ] SOB[ ] cough [ ] sputum[ ] hemoptysis[ ]                                                                  NEGATIVE[ ]   GI:  nausea[ ]  vomiting [ ]  diarrhea[ ] constipation [ ] melena [ ]                                                                         NEGATIVE[ X]   : hematuria[ ]  dysuria[ ] urgency[ ] incontinence[ ]                                                                                            NEGATIVE[ X]   MUSKULOSKELETAL:  arthritis[ ]  joint swelling [ ] muscle weakness [ ] Hx vein stripping [ ]                             NEGATIVE[X ]   SKIN/BREAST:  rash[ ] itching [ ]  hair loss[ ] masses[ ]                                                                                              NEGATIVE[ X]   PSYCH:  dementia [ ] depression [ ] anxiety[ ]                                                                                                               NEGATIVE[X ]   HEME/LYMPH:  bruises easily[ ] enlarged lymph nodes[ ] tender lymph nodes[ ]                                               NEGATIVE[ X]   ENDOCRINE:  cold intolerance[ ] heat intolerance[ ] polydipsia[ ]                                                                          NEGATIVE[ X]     PHYSICAL EXAM  Vital Signs Last 24 Hrs  T(C): 36.1 (20 Mar 2023 07:50), Max: 36.4 (20 Mar 2023 00:26)  T(F): 97 (20 Mar 2023 07:50), Max: 97.6 (20 Mar 2023 00:26)  HR: 70 (20 Mar 2023 07:50) (70 - 78)  BP: 117/65 (20 Mar 2023 07:50) (117/65 - 122/58)  RR: 18 (20 Mar 2023 07:50) (18 - 18)  SpO2: 98% (20 Mar 2023 07:50) (98% - 99%)      Right arm bp: unable to assess, s/p cardiac cath    CONSTITUTIONAL:  WNL[X]   Neuro: WNL [X] Normal exam oriented to person/place & time with no focal motor or sensory  deficits. Other                     Eyes:    WNL [X] Normal exam of conjunctiva & lids, pupils equally reactive. Other     ENT:     WNL [X] Normal exam of nasal/oral mucosa with absence of cyanosis. Other  Neck:   WNL [X] Normal exam of jugular veins, trachea & thyroid. Other  Chest:  WNL [X] Normal lung exam with good air movement absence of wheezes, rales, or rhonchi: Other                                                                                CV:  Auscultation: normal [X] S3[ ] S4[ ] Irregular [ ] Rub[ ] Clicks[ ]    Murmurs none:[X]systolic [ ]  diastolic [ ] holosystolic [ ]  Carotids: No Bruits[X] Other                  Abdominal Aorta: normal [ ] nonpalpable[ ]Other                                                                                      GI: WNL[X] Normal exam of abdomen, liver & spleen with no noted masses or tenderness. Other                                                                                                        Extremities: WNL[X] Normal no evidence of cyanosis or deformity Edema: none[X]trace[ ]1+[ ]2+[ ]3+[ ]4+[ ]  Lower Extremity Pulses: Right[X] Left[X]Varicosities[ ]  SKIN :WNL[X] Normal exam to inspection & palpation. Other:                                                          LABS:                        12.3   4.46  )-----------( 178      ( 20 Mar 2023 07:26 )             37.6         140  |  102  |  10  ----------------------------<  111<H>  4.1   |  28  |  0.6<L>    Ca    9.7      20 Mar 2023 07:26  Mg     1.7           PT/INR - ( 20 Mar 2023 07:26 )   PT: 12.40 sec;   INR: 1.08 ratio         PTT - ( 20 Mar 2023 07:26 )  PTT:33.2 sec      COVID Result: COVID-19 PCR: NotDetec (23 @ 16:51)      Cardiac Cath: 3/20  FINDINGS:   Coronary Dominance: right  LM: Calcified. 50% distal stenosis.   LAD:  Calcified. 80% mLAD bifurcation stenosis with ostial D1 stenosis. Moderate disease in remainder of vessel.  CX:  Calcified. 90% ostial stenosis. Moderate disease in remainder of vessel.  RCA:  Calcified. 60% mRCA stenosis. Severe RPDA disease. 80% RPL stenosis.     LVEDP:  14 mmHg     EF: 60 % on TTE    ESTIMATED BLOOD LOSS: < 10 mL      CONDITION:   [x] Good   [ ] Fair   [ ] Critical     SPECIMEN REMOVED: N/A     POST-OP DIAGNOSIS:    [ ] Normal Coronary Angiogram   [ ] Mild Coronary Artery Disease (< 50% stenosis)   [ x] 3- Vessel Coronary Artery Disease. SYNTAX 27        TTE / JUAN:    < from: TTE Echo Complete w/ Contrast w/ Doppler (23 @ 10:14) >  Summary:  1. Endocardial visualization was enhanced with intravenous echo contrast.   2. Left ventricular ejection fraction, by visual estimation, is 60 to   65%.   3. Normal global left ventricular systolic function.    PHYSICIAN INTERPRETATION:  Left Ventricle: Endocardial visualization was enhanced with intravenous   echo contrast. The left ventricular internal cavity size is mildly   increased. Left ventricular wall thickness is normal. Global LV systolic   function was normal. Left ventricular ejection fraction, by visual   estimation, is 60 to 65%. Spectral Doppler shows normal pattern of LV   diastolic filling.  Right Ventricle: Normal right ventricular size and function.  Left Atrium: Normal left atrial size.  Right Atrium: Normal right atrial size. RA Area is 11.33 cm² cm2.  Pericardium: Trivial pericardial effusion.  Mitral Valve: Structurally normal mitral valve with normal leaflet   excursion. No evidence of mitral stenosis. Trivial mitral regurgitation.  Tricuspid Valve: The tricuspid valve is grossly normal. No tricuspid   regurgitation visualized.  Aortic Valve: Normal trileaflet aortic valve with normal opening. No   evidence of aortic stenosis. No aortic regurgitation.  Pulmonic Valve: Trace pulmonic regurgitation.  Aorta: The aortic root and ascending aorta are normal in size.  Venous: The inferior vena cava was normal sized, with respiratory size   variation greater than 50%.        STS Score:     Risk of Mortality: 1.003%    Renal Failure:  1.034%    Permanent Stroke:  1.157%    Prolonged Ventilation:  5.860%    DSW Infection:  0.247%    Reoperation:  1.050%    Morbidity or Mortality:  8.335%    Short Length of Stay:  51.891%  Long Length of Stay:  3.000%    Impression:  CAD [X]  Valvular  disease [ ]   Aortic Disease [ ]   LUIS A: Yes[ ] No [X]  CKD Stage I [ ] , Stage II [ ] , Stage III [ ], Stage IV [ ]   Anemia: Yes [ ], No [X]  Diabetes :Yes [X], No [ ]  Acute MI: Yes [ ], No [X]   Heart Failure: Yes [ ] , No [ ] HFpEF [ ], HFrEF [ ]      Assessment/ Plan:     63 y/o female with PMHx of HTN, HLD, COPD not on home O2, pre-DM, active smoker presented to the ED for chest tightness since 9am yesterday. She reports after breakfast she sat on the couch watching tv, when she suddenly developed substernal chest tightness with SOB and flushing. She got up to get her inhalers when she became dizzy prompting her ED visit yesterday. She described the chest tightness as non-radiating, substernal, improves on ambulation, worse on rest. She denies any similar episodes in the past. Denies any fevers, chills, abdominal pain, nausea, vomiting, diarrhea, constipation, LE swelling. Never had stress test before, no cardiac cath done. FH+ father  from MI at age 60. Today, patient underwent cardiac cath via right wrist which revealed left main disease, calcified LAD (80%), CX (90%), & RCA (60%)m and severe RPDA disease. CT surgery was consulted for possible myocardial revascularization via CABG.     -Case and plan discussed with CT surgeon Dr. Chris. Initial STS risk assessed and discussed with patient. Evaluation by full heart team pending. Attending note to follow. Pre-op for:     Recommendations:  [] hold Plavix  [] hold ASA if Pre-op Cardiac Valve surgery and patient without CAD  [] hold ACEI/ARB/CCB 24 hours prior to planned procedure   [] LUE/RUE precaution for possible radial artery harvest      Labs:  [] CBC  [] CMP  [] PT/INR/PTT  [X BNP  [] HgA1c  [] Type and screen  [X Urinalysis  [X MRSA  [] COVID pcr  [X} Thyroid Panel    Diagnostic studies  [] CT HEAD Non-Contrast  [] CT Chest without/with contrast   [X Carotid Duplex  [X PFT: Simple PFT [X]  Full [ ]  [] LARRY/PVR  [] TTE    Consultations/Evaluations   [] Renal Consult  [] Pulmonary Consult  [] Vascular Consult  [] Dental Consult   [] Hem-Onc Consult   [] GI Consult   [] Other Consultations :

## 2023-03-21 LAB
ALBUMIN SERPL ELPH-MCNC: 3.9 G/DL — SIGNIFICANT CHANGE UP (ref 3.5–5.2)
ALP SERPL-CCNC: 93 U/L — SIGNIFICANT CHANGE UP (ref 30–115)
ALT FLD-CCNC: 10 U/L — SIGNIFICANT CHANGE UP (ref 0–41)
ANION GAP SERPL CALC-SCNC: 8 MMOL/L — SIGNIFICANT CHANGE UP (ref 7–14)
AST SERPL-CCNC: 13 U/L — SIGNIFICANT CHANGE UP (ref 0–41)
BASOPHILS # BLD AUTO: 0.02 K/UL — SIGNIFICANT CHANGE UP (ref 0–0.2)
BASOPHILS NFR BLD AUTO: 0.5 % — SIGNIFICANT CHANGE UP (ref 0–1)
BILIRUB SERPL-MCNC: 0.4 MG/DL — SIGNIFICANT CHANGE UP (ref 0.2–1.2)
BUN SERPL-MCNC: 12 MG/DL — SIGNIFICANT CHANGE UP (ref 10–20)
CALCIUM SERPL-MCNC: 9.2 MG/DL — SIGNIFICANT CHANGE UP (ref 8.4–10.5)
CHLORIDE SERPL-SCNC: 110 MMOL/L — SIGNIFICANT CHANGE UP (ref 98–110)
CO2 SERPL-SCNC: 26 MMOL/L — SIGNIFICANT CHANGE UP (ref 17–32)
CREAT SERPL-MCNC: 0.7 MG/DL — SIGNIFICANT CHANGE UP (ref 0.7–1.5)
EGFR: 97 ML/MIN/1.73M2 — SIGNIFICANT CHANGE UP
EOSINOPHIL # BLD AUTO: 0.19 K/UL — SIGNIFICANT CHANGE UP (ref 0–0.7)
EOSINOPHIL NFR BLD AUTO: 4.6 % — SIGNIFICANT CHANGE UP (ref 0–8)
GLUCOSE SERPL-MCNC: 115 MG/DL — HIGH (ref 70–99)
HCT VFR BLD CALC: 35.8 % — LOW (ref 37–47)
HGB BLD-MCNC: 11.7 G/DL — LOW (ref 12–16)
IMM GRANULOCYTES NFR BLD AUTO: 0.2 % — SIGNIFICANT CHANGE UP (ref 0.1–0.3)
LYMPHOCYTES # BLD AUTO: 1.75 K/UL — SIGNIFICANT CHANGE UP (ref 1.2–3.4)
LYMPHOCYTES # BLD AUTO: 42.1 % — SIGNIFICANT CHANGE UP (ref 20.5–51.1)
MAGNESIUM SERPL-MCNC: 1.7 MG/DL — LOW (ref 1.8–2.4)
MCHC RBC-ENTMCNC: 30.2 PG — SIGNIFICANT CHANGE UP (ref 27–31)
MCHC RBC-ENTMCNC: 32.7 G/DL — SIGNIFICANT CHANGE UP (ref 32–37)
MCV RBC AUTO: 92.5 FL — SIGNIFICANT CHANGE UP (ref 81–99)
MONOCYTES # BLD AUTO: 0.39 K/UL — SIGNIFICANT CHANGE UP (ref 0.1–0.6)
MONOCYTES NFR BLD AUTO: 9.4 % — HIGH (ref 1.7–9.3)
NEUTROPHILS # BLD AUTO: 1.8 K/UL — SIGNIFICANT CHANGE UP (ref 1.4–6.5)
NEUTROPHILS NFR BLD AUTO: 43.2 % — SIGNIFICANT CHANGE UP (ref 42.2–75.2)
NRBC # BLD: 0 /100 WBCS — SIGNIFICANT CHANGE UP (ref 0–0)
NT-PROBNP SERPL-SCNC: <5 PG/ML — SIGNIFICANT CHANGE UP (ref 0–300)
PLATELET # BLD AUTO: 169 K/UL — SIGNIFICANT CHANGE UP (ref 130–400)
POTASSIUM SERPL-MCNC: 4.1 MMOL/L — SIGNIFICANT CHANGE UP (ref 3.5–5)
POTASSIUM SERPL-SCNC: 4.1 MMOL/L — SIGNIFICANT CHANGE UP (ref 3.5–5)
PROT SERPL-MCNC: 6.6 G/DL — SIGNIFICANT CHANGE UP (ref 6–8)
RBC # BLD: 3.87 M/UL — LOW (ref 4.2–5.4)
RBC # FLD: 13.4 % — SIGNIFICANT CHANGE UP (ref 11.5–14.5)
SODIUM SERPL-SCNC: 144 MMOL/L — SIGNIFICANT CHANGE UP (ref 135–146)
T3 SERPL-MCNC: 117 NG/DL — SIGNIFICANT CHANGE UP (ref 80–200)
T4 AB SER-ACNC: 5.9 UG/DL — SIGNIFICANT CHANGE UP (ref 4.6–12)
TSH SERPL-MCNC: 3.26 UIU/ML — SIGNIFICANT CHANGE UP (ref 0.27–4.2)
WBC # BLD: 4.16 K/UL — LOW (ref 4.8–10.8)
WBC # FLD AUTO: 4.16 K/UL — LOW (ref 4.8–10.8)

## 2023-03-21 PROCEDURE — 93010 ELECTROCARDIOGRAM REPORT: CPT

## 2023-03-21 PROCEDURE — 99233 SBSQ HOSP IP/OBS HIGH 50: CPT

## 2023-03-21 PROCEDURE — 93880 EXTRACRANIAL BILAT STUDY: CPT | Mod: 26

## 2023-03-21 PROCEDURE — 94010 BREATHING CAPACITY TEST: CPT | Mod: 26

## 2023-03-21 RX ORDER — MAGNESIUM SULFATE 500 MG/ML
2 VIAL (ML) INJECTION ONCE
Refills: 0 | Status: DISCONTINUED | OUTPATIENT
Start: 2023-03-21 | End: 2023-03-21

## 2023-03-21 RX ORDER — MAGNESIUM SULFATE 500 MG/ML
2 VIAL (ML) INJECTION ONCE
Refills: 0 | Status: COMPLETED | OUTPATIENT
Start: 2023-03-21 | End: 2023-03-21

## 2023-03-21 RX ADMIN — ATORVASTATIN CALCIUM 40 MILLIGRAM(S): 80 TABLET, FILM COATED ORAL at 22:34

## 2023-03-21 RX ADMIN — CARVEDILOL PHOSPHATE 6.25 MILLIGRAM(S): 80 CAPSULE, EXTENDED RELEASE ORAL at 17:20

## 2023-03-21 RX ADMIN — Medication 25 GRAM(S): at 08:48

## 2023-03-21 RX ADMIN — PANTOPRAZOLE SODIUM 40 MILLIGRAM(S): 20 TABLET, DELAYED RELEASE ORAL at 17:21

## 2023-03-21 RX ADMIN — AMLODIPINE BESYLATE 10 MILLIGRAM(S): 2.5 TABLET ORAL at 06:30

## 2023-03-21 RX ADMIN — CARVEDILOL PHOSPHATE 6.25 MILLIGRAM(S): 80 CAPSULE, EXTENDED RELEASE ORAL at 06:30

## 2023-03-21 RX ADMIN — PANTOPRAZOLE SODIUM 40 MILLIGRAM(S): 20 TABLET, DELAYED RELEASE ORAL at 06:30

## 2023-03-21 RX ADMIN — Medication 81 MILLIGRAM(S): at 11:21

## 2023-03-21 RX ADMIN — ALBUTEROL 2 PUFF(S): 90 AEROSOL, METERED ORAL at 02:10

## 2023-03-21 NOTE — PROGRESS NOTE ADULT - SUBJECTIVE AND OBJECTIVE BOX
JENNIFER TORRES 62y Female  MRN#: [id number]   CODE STATUS:________    Hospital Day: 3d    patient admitted for cp s/p cath yesterday tripple vessel disease planning for cabg on thursday remains stable no event overnight                                            OBJECTIVE  PAST MEDICAL & SURGICAL HISTORY  Hypertension    High cholesterol                                                ALLERGIES:  No Known Allergies                           HOME MEDICATIONS  Home Medications:  Albuterol (Eqv-Ventolin HFA) 90 mcg/inh inhalation aerosol: 2 puff(s) inhaled every 6 hours (18 Mar 2023 15:42)  amLODIPine 10 mg oral tablet: 1 tab(s) orally once a day (18 Mar 2023 15:42)  Aspir 81 oral delayed release tablet: 1 tab(s) orally once a day (18 Mar 2023 15:42)  atorvastatin 40 mg oral tablet: 1 tab(s) orally once a day (18 Mar 2023 15:42)  carvedilol 6.25 mg oral tablet: 1 tab(s) orally 2 times a day (18 Mar 2023 15:41)  Combivent Respimat 20 mcg-100 mcg/inh inhalation aerosol: 1 puff(s) inhaled 4 times a day (18 Mar 2023 15:41)                           MEDICATIONS:  STANDING MEDICATIONS  albuterol    90 MICROgram(s) HFA Inhaler 2 Puff(s) Inhalation every 6 hours  amLODIPine   Tablet 10 milliGRAM(s) Oral daily  aspirin enteric coated 81 milliGRAM(s) Oral daily  atorvastatin 40 milliGRAM(s) Oral at bedtime  carvedilol 6.25 milliGRAM(s) Oral every 12 hours  coronavirus bivalent (EUA) Booster Vaccine (PFIZER) 0.3 milliLiter(s) IntraMuscular once  enoxaparin Injectable 40 milliGRAM(s) SubCutaneous every 24 hours  pantoprazole    Tablet 40 milliGRAM(s) Oral every 12 hours  sodium chloride 0.9%. 1000 milliLiter(s) IV Continuous <Continuous>  tiotropium 2.5 MICROgram(s) Inhaler 2 Puff(s) Inhalation daily    PRN MEDICATIONS                                            ------------------------------------------------------------  VITAL SIGNS: Last 24 Hours  T(C): 36.1 (21 Mar 2023 12:41), Max: 36.4 (20 Mar 2023 20:25)  T(F): 97 (21 Mar 2023 12:41), Max: 97.6 (21 Mar 2023 04:24)  HR: 70 (21 Mar 2023 12:41) (70 - 72)  BP: 143/64 (21 Mar 2023 12:41) (119/62 - 146/64)  BP(mean): --  RR: 18 (21 Mar 2023 12:41) (18 - 18)  SpO2: --                                               LABS:                        11.7   4.16  )-----------( 169      ( 21 Mar 2023 05:51 )             35.8     03-21    144  |  110  |  12  ----------------------------<  115<H>  4.1   |  26  |  0.7    Ca    9.2      21 Mar 2023 05:51  Mg     1.7     03-21    TPro  6.6  /  Alb  3.9  /  TBili  0.4  /  DBili  x   /  AST  13  /  ALT  10  /  AlkPhos  93  03-21    PT/INR - ( 20 Mar 2023 07:26 )   PT: 12.40 sec;   INR: 1.08 ratio         PTT - ( 20 Mar 2023 07:26 )  PTT:33.2 sec                                                          RADIOLOGY:        PHYSICAL EXAM:  GENERAL: NAD, lying in bed comfortably  HEAD:  Atraumatic, Normocephalic  EYES: EOMI, PERRLA, conjunctiva and sclera clear  ENT: Moist mucous membranes  NECK: Supple, No JVD  CHEST/LUNG: Clear to auscultation bilaterally; No rales, rhonchi, wheezing, or rubs. Unlabored respirations  HEART: Regular rate and rhythm; No murmurs, rubs, or gallops  ABDOMEN: BSx4; Soft, nontender, nondistended  EXTREMITIES:  2+ Peripheral Pulses, brisk capillary refill. No clubbing, cyanosis, or edema  NERVOUS SYSTEM:  A&Ox3, no focal deficits   SKIN: No rashes or lesions                                         ASSESSMENT & PLAN    # Chest pain, ACS ruled out  - CCTA with high calcium socre 1504, high likelihood of CAD  - c/w telemetry  - NM stress test done, pending result, Cardiology evaluation if positive stress test  -s/p cath  tripple vs dz now for cts on thursday     # COPD not on home O2, stable   - c/w home inhalers    # Trace Pericardial Effusion incidentally noted on CCTA  - will obtain TTE to further assess    # Prominent Mediastinal LN, possibly reactive   - afebrile, no leukocytosis    # HTN  # HLD  - continue with amlodipine and atorvastatin 40mg   - will resume home coreg    # Pre-DM  - currently not on medications  - monitor FS, if FS>180 start basal/bolus    # Active smoker  - discussed smoking cessation

## 2023-03-21 NOTE — PROGRESS NOTE ADULT - SUBJECTIVE AND OBJECTIVE BOX
JENNIFER TORRES  62y Female    CHIEF COMPLAINT:    Patient is a 62y old  Female who presents with a chief complaint of Chest pain (20 Mar 2023 13:09)      INTERVAL HPI/OVERNIGHT EVENTS:    Patient seen and examined.    ROS: All other systems are negative.    Vital Signs:    T(F): 97.6 (23 @ 04:24), Max: 97.6 (23 @ 04:24)  HR: 72 (23 @ 04:24) (71 - 72)  BP: 123/64 (23 @ 04:24) (119/62 - 146/64)  RR: 18 (23 @ 04:24) (18 - 18)  SpO2: --  I&O's Summary    Daily Height in cm: 167.64 (20 Mar 2023 18:03)    Daily Weight in k (21 Mar 2023 04:24)  CAPILLARY BLOOD GLUCOSE      POCT Blood Glucose.: 150 mg/dL (20 Mar 2023 21:33)  POCT Blood Glucose.: 95 mg/dL (20 Mar 2023 14:44)      PHYSICAL EXAM:    GENERAL:  NAD  SKIN: No rashes or lesions  HENT: Atraumatic. Normocephalic. PERRL. Moist membranes.  NECK: Supple, No JVD. No lymphadenopathy.  PULMONARY: CTA B/L. No wheezing. No rales  CVS: Normal S1, S2. Rate and Rhythm are regular. No murmurs.  ABDOMEN/GI: Soft, Nontender, Nondistended; BS present  EXTREMITIES: Peripheral pulses intact. No edema B/L LE.  NEUROLOGIC:  No motor or sensory deficit.  PSYCH: Alert & oriented x 3    Consultant(s) Notes Reviewed:  [x ] YES  [ ] NO  Care Discussed with Consultants/Other Providers [ x] YES  [ ] NO    EKG reviewed  Telemetry reviewed    LABS:                        11.7   4.16  )-----------( 169      ( 21 Mar 2023 05:51 )             35.8         144  |  110  |  12  ----------------------------<  115<H>  4.1   |  26  |  0.7    Ca    9.2      21 Mar 2023 05:51  Mg     1.7         TPro  6.6  /  Alb  3.9  /  TBili  0.4  /  DBili  x   /  AST  13  /  ALT  10  /  AlkPhos  93  03-21    PT/INR - ( 20 Mar 2023 07:26 )   PT: 12.40 sec;   INR: 1.08 ratio         PTT - ( 20 Mar 2023 07:26 )  PTT:33.2 sec          RADIOLOGY & ADDITIONAL TESTS:    < from: TTE Echo Complete w/ Contrast w/ Doppler (23 @ 10:14) >    Summary:  1. Endocardial visualization was enhanced with intravenous echo contrast.   2. Left ventricular ejection fraction, by visual estimation, is 60 to   65%.   3. Normal global left ventricular systolic function.    < end of copied text >  < from: NM Nuclear Stress Pharmacologic Multiple (23 @ 10:25) >  Impression:  1.  Moderate to large severe reversible defect involving the inferior   wall of the left ventricle consistent with ischemia.  2.  Normal left ventricular wall motion and wall thickening.  3.  Left ventricular ejection fraction of 69% which is within the range   of normal.      < end of copied text >    Imaging or report Personally Reviewed:  [x ] YES  [ ] NO    Medications:  Standing  albuterol    90 MICROgram(s) HFA Inhaler 2 Puff(s) Inhalation every 6 hours  amLODIPine   Tablet 10 milliGRAM(s) Oral daily  aspirin enteric coated 81 milliGRAM(s) Oral daily  atorvastatin 40 milliGRAM(s) Oral at bedtime  carvedilol 6.25 milliGRAM(s) Oral every 12 hours  coronavirus bivalent (EUA) Booster Vaccine (PFIZER) 0.3 milliLiter(s) IntraMuscular once  enoxaparin Injectable 40 milliGRAM(s) SubCutaneous every 24 hours  pantoprazole    Tablet 40 milliGRAM(s) Oral every 12 hours  sodium chloride 0.9%. 1000 milliLiter(s) IV Continuous <Continuous>  tiotropium 2.5 MICROgram(s) Inhaler 2 Puff(s) Inhalation daily    PRN Meds      Case discussed with resident    Care discussed with pt/family

## 2023-03-22 LAB
ABO RH CONFIRMATION: SIGNIFICANT CHANGE UP
ALBUMIN SERPL ELPH-MCNC: 4.4 G/DL — SIGNIFICANT CHANGE UP (ref 3.5–5.2)
ALP SERPL-CCNC: 107 U/L — SIGNIFICANT CHANGE UP (ref 30–115)
ALT FLD-CCNC: 12 U/L — SIGNIFICANT CHANGE UP (ref 0–41)
ANION GAP SERPL CALC-SCNC: 11 MMOL/L — SIGNIFICANT CHANGE UP (ref 7–14)
APPEARANCE UR: ABNORMAL
AST SERPL-CCNC: 15 U/L — SIGNIFICANT CHANGE UP (ref 0–41)
BACTERIA # UR AUTO: ABNORMAL
BASOPHILS # BLD AUTO: 0.02 K/UL — SIGNIFICANT CHANGE UP (ref 0–0.2)
BASOPHILS NFR BLD AUTO: 0.4 % — SIGNIFICANT CHANGE UP (ref 0–1)
BILIRUB SERPL-MCNC: 0.4 MG/DL — SIGNIFICANT CHANGE UP (ref 0.2–1.2)
BILIRUB UR-MCNC: NEGATIVE — SIGNIFICANT CHANGE UP
BLD GP AB SCN SERPL QL: SIGNIFICANT CHANGE UP
BUN SERPL-MCNC: 11 MG/DL — SIGNIFICANT CHANGE UP (ref 10–20)
CALCIUM SERPL-MCNC: 9.7 MG/DL — SIGNIFICANT CHANGE UP (ref 8.4–10.4)
CHLORIDE SERPL-SCNC: 104 MMOL/L — SIGNIFICANT CHANGE UP (ref 98–110)
CO2 SERPL-SCNC: 25 MMOL/L — SIGNIFICANT CHANGE UP (ref 17–32)
COLOR SPEC: ABNORMAL
CREAT SERPL-MCNC: 0.7 MG/DL — SIGNIFICANT CHANGE UP (ref 0.7–1.5)
DIFF PNL FLD: ABNORMAL
EGFR: 97 ML/MIN/1.73M2 — SIGNIFICANT CHANGE UP
EOSINOPHIL # BLD AUTO: 0.22 K/UL — SIGNIFICANT CHANGE UP (ref 0–0.7)
EOSINOPHIL NFR BLD AUTO: 4.2 % — SIGNIFICANT CHANGE UP (ref 0–8)
EPI CELLS # UR: 1 /HPF — SIGNIFICANT CHANGE UP (ref 0–5)
GLUCOSE BLDC GLUCOMTR-MCNC: 109 MG/DL — HIGH (ref 70–99)
GLUCOSE BLDC GLUCOMTR-MCNC: 110 MG/DL — HIGH (ref 70–99)
GLUCOSE BLDC GLUCOMTR-MCNC: 117 MG/DL — HIGH (ref 70–99)
GLUCOSE BLDC GLUCOMTR-MCNC: 161 MG/DL — HIGH (ref 70–99)
GLUCOSE SERPL-MCNC: 114 MG/DL — HIGH (ref 70–99)
GLUCOSE UR QL: NEGATIVE — SIGNIFICANT CHANGE UP
HCT VFR BLD CALC: 37.9 % — SIGNIFICANT CHANGE UP (ref 37–47)
HGB BLD-MCNC: 12.3 G/DL — SIGNIFICANT CHANGE UP (ref 12–16)
HYALINE CASTS # UR AUTO: 8 /LPF — HIGH (ref 0–7)
IMM GRANULOCYTES NFR BLD AUTO: 0.4 % — HIGH (ref 0.1–0.3)
KETONES UR-MCNC: SIGNIFICANT CHANGE UP
LEUKOCYTE ESTERASE UR-ACNC: ABNORMAL
LYMPHOCYTES # BLD AUTO: 2.31 K/UL — SIGNIFICANT CHANGE UP (ref 1.2–3.4)
LYMPHOCYTES # BLD AUTO: 43.6 % — SIGNIFICANT CHANGE UP (ref 20.5–51.1)
MAGNESIUM SERPL-MCNC: 1.9 MG/DL — SIGNIFICANT CHANGE UP (ref 1.8–2.4)
MCHC RBC-ENTMCNC: 30.6 PG — SIGNIFICANT CHANGE UP (ref 27–31)
MCHC RBC-ENTMCNC: 32.5 G/DL — SIGNIFICANT CHANGE UP (ref 32–37)
MCV RBC AUTO: 94.3 FL — SIGNIFICANT CHANGE UP (ref 81–99)
MONOCYTES # BLD AUTO: 0.36 K/UL — SIGNIFICANT CHANGE UP (ref 0.1–0.6)
MONOCYTES NFR BLD AUTO: 6.8 % — SIGNIFICANT CHANGE UP (ref 1.7–9.3)
MRSA PCR RESULT.: POSITIVE
NEUTROPHILS # BLD AUTO: 2.37 K/UL — SIGNIFICANT CHANGE UP (ref 1.4–6.5)
NEUTROPHILS NFR BLD AUTO: 44.6 % — SIGNIFICANT CHANGE UP (ref 42.2–75.2)
NITRITE UR-MCNC: POSITIVE
NRBC # BLD: 0 /100 WBCS — SIGNIFICANT CHANGE UP (ref 0–0)
PH UR: 6.5 — SIGNIFICANT CHANGE UP (ref 5–8)
PHOSPHATE SERPL-MCNC: 3.5 MG/DL — SIGNIFICANT CHANGE UP (ref 2.1–4.9)
PLATELET # BLD AUTO: 187 K/UL — SIGNIFICANT CHANGE UP (ref 130–400)
POTASSIUM SERPL-MCNC: 4.3 MMOL/L — SIGNIFICANT CHANGE UP (ref 3.5–5)
POTASSIUM SERPL-SCNC: 4.3 MMOL/L — SIGNIFICANT CHANGE UP (ref 3.5–5)
PROT SERPL-MCNC: 7.3 G/DL — SIGNIFICANT CHANGE UP (ref 6–8)
PROT UR-MCNC: ABNORMAL
RBC # BLD: 4.02 M/UL — LOW (ref 4.2–5.4)
RBC # FLD: 13.3 % — SIGNIFICANT CHANGE UP (ref 11.5–14.5)
RBC CASTS # UR COMP ASSIST: >720 /HPF — HIGH (ref 0–4)
SODIUM SERPL-SCNC: 140 MMOL/L — SIGNIFICANT CHANGE UP (ref 135–146)
SP GR SPEC: 1.02 — SIGNIFICANT CHANGE UP (ref 1.01–1.03)
UROBILINOGEN FLD QL: SIGNIFICANT CHANGE UP
WBC # BLD: 5.3 K/UL — SIGNIFICANT CHANGE UP (ref 4.8–10.8)
WBC # FLD AUTO: 5.3 K/UL — SIGNIFICANT CHANGE UP (ref 4.8–10.8)
WBC UR QL: 518 /HPF — HIGH (ref 0–5)

## 2023-03-22 PROCEDURE — 99232 SBSQ HOSP IP/OBS MODERATE 35: CPT

## 2023-03-22 PROCEDURE — 99232 SBSQ HOSP IP/OBS MODERATE 35: CPT | Mod: 57

## 2023-03-22 RX ORDER — CEFTRIAXONE 500 MG/1
1000 INJECTION, POWDER, FOR SOLUTION INTRAMUSCULAR; INTRAVENOUS ONCE
Refills: 0 | Status: COMPLETED | OUTPATIENT
Start: 2023-03-22 | End: 2023-03-22

## 2023-03-22 RX ORDER — MUPIROCIN 20 MG/G
1 OINTMENT TOPICAL
Refills: 0 | Status: DISCONTINUED | OUTPATIENT
Start: 2023-03-22 | End: 2023-03-23

## 2023-03-22 RX ORDER — CHLORHEXIDINE GLUCONATE 213 G/1000ML
1 SOLUTION TOPICAL ONCE
Refills: 0 | Status: COMPLETED | OUTPATIENT
Start: 2023-03-22 | End: 2023-03-22

## 2023-03-22 RX ORDER — CEFTRIAXONE 500 MG/1
INJECTION, POWDER, FOR SOLUTION INTRAMUSCULAR; INTRAVENOUS
Refills: 0 | Status: DISCONTINUED | OUTPATIENT
Start: 2023-03-22 | End: 2023-03-23

## 2023-03-22 RX ORDER — CHLORHEXIDINE GLUCONATE 213 G/1000ML
1 SOLUTION TOPICAL ONCE
Refills: 0 | Status: COMPLETED | OUTPATIENT
Start: 2023-03-23 | End: 2023-03-23

## 2023-03-22 RX ORDER — ALBUMIN HUMAN 25 %
3000 VIAL (ML) INTRAVENOUS ONCE
Refills: 0 | Status: DISCONTINUED | OUTPATIENT
Start: 2023-03-23 | End: 2023-03-23

## 2023-03-22 RX ORDER — CHLORHEXIDINE GLUCONATE 213 G/1000ML
15 SOLUTION TOPICAL ONCE
Refills: 0 | Status: COMPLETED | OUTPATIENT
Start: 2023-03-22 | End: 2023-03-23

## 2023-03-22 RX ORDER — CARVEDILOL PHOSPHATE 80 MG/1
6.25 CAPSULE, EXTENDED RELEASE ORAL ONCE
Refills: 0 | Status: COMPLETED | OUTPATIENT
Start: 2023-03-22 | End: 2023-03-22

## 2023-03-22 RX ORDER — CEFTRIAXONE 500 MG/1
1000 INJECTION, POWDER, FOR SOLUTION INTRAMUSCULAR; INTRAVENOUS EVERY 24 HOURS
Refills: 0 | Status: DISCONTINUED | OUTPATIENT
Start: 2023-03-23 | End: 2023-03-23

## 2023-03-22 RX ADMIN — ALBUTEROL 2 PUFF(S): 90 AEROSOL, METERED ORAL at 21:28

## 2023-03-22 RX ADMIN — CEFTRIAXONE 100 MILLIGRAM(S): 500 INJECTION, POWDER, FOR SOLUTION INTRAMUSCULAR; INTRAVENOUS at 17:51

## 2023-03-22 RX ADMIN — PANTOPRAZOLE SODIUM 40 MILLIGRAM(S): 20 TABLET, DELAYED RELEASE ORAL at 05:40

## 2023-03-22 RX ADMIN — CHLORHEXIDINE GLUCONATE 1 APPLICATION(S): 213 SOLUTION TOPICAL at 17:13

## 2023-03-22 RX ADMIN — MUPIROCIN 1 APPLICATION(S): 20 OINTMENT TOPICAL at 18:31

## 2023-03-22 RX ADMIN — ATORVASTATIN CALCIUM 40 MILLIGRAM(S): 80 TABLET, FILM COATED ORAL at 22:53

## 2023-03-22 RX ADMIN — AMLODIPINE BESYLATE 10 MILLIGRAM(S): 2.5 TABLET ORAL at 05:40

## 2023-03-22 RX ADMIN — CARVEDILOL PHOSPHATE 6.25 MILLIGRAM(S): 80 CAPSULE, EXTENDED RELEASE ORAL at 17:57

## 2023-03-22 RX ADMIN — CARVEDILOL PHOSPHATE 6.25 MILLIGRAM(S): 80 CAPSULE, EXTENDED RELEASE ORAL at 05:40

## 2023-03-22 RX ADMIN — Medication 81 MILLIGRAM(S): at 11:05

## 2023-03-22 RX ADMIN — PANTOPRAZOLE SODIUM 40 MILLIGRAM(S): 20 TABLET, DELAYED RELEASE ORAL at 17:48

## 2023-03-22 RX ADMIN — CHLORHEXIDINE GLUCONATE 1 APPLICATION(S): 213 SOLUTION TOPICAL at 22:53

## 2023-03-22 NOTE — PROGRESS NOTE ADULT - SUBJECTIVE AND OBJECTIVE BOX
PLANNED OPERATIVE PROCEDURE(s): CABG                                    SURGEON(s): MD Hartman    HPI:    63 y/o female with PMHx of HTN, HLD, COPD not on home O2, pre-DM, active smoker presented to the ED for chest tightness since 9am yesterday. She reports after breakfast she sat on the couch watching tv, when she suddenly developed substernal chest tightness with SOB and flushing. She got up to get her inhalers when she became dizzy prompting her ED visit yesterday. She described the chest tightness as non-radiating, substernal, improves on ambulation, worse on rest. She denies any similar episodes in the past. Denies any fevers, chills, abdominal pain, nausea, vomiting, diarrhea, constipation, LE swelling. Never had stress test before, no cardiac cath done. FH+ father  from MI at age 58. Today, patient underwent cardiac cath via right wrist which revealed left main disease, calcified LAD (80%), CX (90%), & RCA (60%)m and severe RPDA disease. CT surgery was consulted for possible myocardial revascularization via CABG.       SUBJECTIVE ASSESSMENT:62y Female patient seen and examined at bedside. No complaints at bedside.    Vital Signs Last 24 Hrs  T(F): 97.2 (22 Mar 2023 05:38), Max: 97.8 (21 Mar 2023 20:24)  HR: 77 (22 Mar 2023 05:38) (68 - 77)  BP: 132/60 (22 Mar 2023 05:38) (126/57 - 132/60)  BP(mean): 82 (21 Mar 2023 20:24) (82 - 82)  RR: 19 (21 Mar 2023 20:24) (19 - 19)  SpO2: --    RUE SBP:                LUE SBP:      I&O's Detail    21 Mar 2023 07:  -  22 Mar 2023 07:00  --------------------------------------------------------  IN:    IV PiggyBack: 50 mL  Total IN: 50 mL    OUT:  Total OUT: 0 mL        Net: I&O's Detail    21 Mar 2023 07:01  -  22 Mar 2023 07:00  --------------------------------------------------------  Total NET: 50 mL        CAPILLARY BLOOD GLUCOSE      POCT Blood Glucose.: 161 mg/dL (22 Mar 2023 10:58)  POCT Blood Glucose.: 117 mg/dL (22 Mar 2023 07:45)      Allergies    No Known Allergies    Intolerances        MEDICATIONS  (STANDING):  albuterol    90 MICROgram(s) HFA Inhaler 2 Puff(s) Inhalation every 6 hours  amLODIPine   Tablet 10 milliGRAM(s) Oral daily  aspirin enteric coated 81 milliGRAM(s) Oral daily  atorvastatin 40 milliGRAM(s) Oral at bedtime  carvedilol 6.25 milliGRAM(s) Oral every 12 hours  coronavirus bivalent (EUA) Booster Vaccine (PFIZER) 0.3 milliLiter(s) IntraMuscular once  enoxaparin Injectable 40 milliGRAM(s) SubCutaneous every 24 hours  pantoprazole    Tablet 40 milliGRAM(s) Oral every 12 hours  sodium chloride 0.9%. 1000 milliLiter(s) (75 mL/Hr) IV Continuous <Continuous>  tiotropium 2.5 MICROgram(s) Inhaler 2 Puff(s) Inhalation daily    MEDICATIONS  (PRN):    Home Medications:  Albuterol (Eqv-Ventolin HFA) 90 mcg/inh inhalation aerosol: 2 puff(s) inhaled every 6 hours (18 Mar 2023 15:42)  amLODIPine 10 mg oral tablet: 1 tab(s) orally once a day (18 Mar 2023 15:42)  Aspir 81 oral delayed release tablet: 1 tab(s) orally once a day (18 Mar 2023 15:42)  atorvastatin 40 mg oral tablet: 1 tab(s) orally once a day (18 Mar 2023 15:42)  carvedilol 6.25 mg oral tablet: 1 tab(s) orally 2 times a day (18 Mar 2023 15:41)  Combivent Respimat 20 mcg-100 mcg/inh inhalation aerosol: 1 puff(s) inhaled 4 times a day (18 Mar 2023 15:41)      Physical Exam:  General: NAD; A&Ox3  Cardiac: S1/S2, RRR, no murmur, no rubs  Lungs: unlabored respirations, CTA b/l, no wheeze, no rales, no crackles  Abdomen: Soft/NT/ND; positive bowel sounds x 4  Extremities: No edema b/l lower extremities; good capillary refill; no cyanosis; palpable 1+ pedal pulses b/l    BOWEL MOVEMENT:  [] YES [] NO, If No, Timing since last BM Day #        LABS:                        12.3   5.30  )-----------( 187      ( 22 Mar 2023 06:30 )             37.9                         11.7<L>  4.16<L> )-----------( 169      ( 21 Mar 2023 05:51 )             35.8<L>    03-22    140  |  104  |  11  ----------------------------<  114<H>  4.3   |  25  |  0.7  -    144  |  110  |  12  ----------------------------<  115<H>  4.1   |  26  |  0.7    Ca    9.7      22 Mar 2023 06:30  Phos  3.5     03-22  Mg     1.9     -22    TPro  7.3 [6.0 - 8.0]  /  Alb  4.4 [3.5 - 5.2]  /  TBili  0.4 [0.2 - 1.2]  /  DBili  x   /  AST  15 [0 - 41]  /  ALT  12 [0 - 41]  /  AlkPhos  107 [30 - 115]            A1C with Estimated Average Glucose Result: 6.8 % (23 @ 06:45)      RADIOLOGY & ADDITIONAL TESTS:    CXR: < from: Xray Chest 2 Views PA/Lat (23 @ 11:21) >  PROCEDURE DATE:  2023      Findings:    Support devices: None.    Cardiac/mediastinum/hilum: Within normal limits.    Lung parenchyma/Pleura: Questionable retrocardiac opacity on lateral view   are pronounced than the comparison radiograph. No pneumothorax or pleural   effusion.    Skeleton/soft tissues:  No radiographically evident acute displaced   fracture within the limitations of this exam.    Impression:    Questionable retrocardiac opacity on lateral view is pronounced than the   comparison radiograph. No pneumothorax or pleural effusion. Consider   further evaluation with CT.      EKG:  < from: 12 Lead ECG (23 @ 07:43) >  Ventricular Rate 68 BPM    Atrial Rate 68 BPM    P-R Interval 176 ms    QRS Duration 94 ms    Q-T Interval 410 ms    QTC Calculation(Bazett) 435 ms    P Axis 64 degrees    R Axis 57 degrees    T Axis 96 degrees    Diagnosis Line Normal sinus rhythm  Nonspecific T wave abnormality  Abnormal ECG      Carotid Duplex: < from: VA Duplex Carotid, Bilat (23 @ 16:20) >  IMPRESSION:    Mild bilateralcarotid artery stenosis estimated 20-39%.  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       PFT's: FEV1: 80%    Echocardiogram: < from: TTE Echo Complete w/ Contrast w/ Doppler (23 @ 10:14) >  Summary:  1. Endocardial visualization was enhanced with intravenous echo contrast.   2. Left ventricular ejection fraction, by visual estimation, is 60 to   65%.   3. Normal global left ventricular systolic function.    PHYSICIAN INTERPRETATION:  Left Ventricle: Endocardial visualization was enhanced with intravenous   echo contrast. The left ventricular internal cavity size is mildly   increased. Left ventricular wall thickness is normal. Global LV systolic   function was normal. Left ventricular ejection fraction, by visual   estimation, is 60 to 65%. Spectral Doppler shows normal pattern of LV   diastolic filling.  Right Ventricle: Normal right ventricular size and function.  Left Atrium: Normal left atrial size.  Right Atrium: Normal right atrial size. RA Area is 11.33 cm² cm2.  Pericardium: Trivial pericardial effusion.  Mitral Valve: Structurally normal mitral valve with normal leaflet   excursion. No evidence of mitral stenosis. Trivial mitral regurgitation.  Tricuspid Valve: The tricuspid valve is grossly normal. No tricuspid   regurgitation visualized.  Aortic Valve: Normal trileaflet aortic valve with normal opening. No   evidence of aortic stenosis. No aortic regurgitation.  Pulmonic Valve: Trace pulmonic regurgitation.  Aorta: The aortic root and ascending aorta are normal in size.  Venous: The inferior vena cava was normal sized, with respiratory size   variation greater than 50%.      Cardiac catheterization: 3/22    FINDINGS:   Coronary Dominance: right  LM: Calcified. 50% distal stenosis.   LAD:  Calcified. 80% mLAD bifurcation stenosis with ostial D1 stenosis. Moderate disease in remainder of vessel.  CX:  Calcified. 90% ostial stenosis. Moderate disease in remainder of vessel.  RCA:  Calcified. 60% mRCA stenosis. Severe RPDA disease. 80% RPL stenosis.     LVEDP:  14 mmHg     EF: 60 % on TTE      CT CHEST: < from: CT Chest No Cont (23 @ 13:44) >  TECHNIQUE: Multislice helical sections were obtained from the thoracic   inlet to the lung bases without contrast administration. Coronal and   sagittal reformatted images and axial MIPs are also submitted.    COMPARISON: None.    FINDINGS:    TUBES/LINES: None.    LUNGS, PLEURA, AND AIRWAYS: Mild upper lung zone paraseptal emphysema. No   focal parenchymal opacities, pleural effusions or pneumothorax. Bibasilar   linear subsegmental atelectasis. Patent central airways.    MEDIASTINUM/LYMPH NODES: Nonspecific few prominent mediastinal lymph   nodes. For example, a right paratracheal lymph node measures 1.9 x 1.4   cm. Unremarkable visualized thyroid gland.    HEART/GREAT VESSELS: Normal heart size. Trace pericardial effusion.   Atherosclerotic vascular calcifications including coronary arteries.    BONES/SOFT TISSUES: Multilevel degenerative spine noted.    VISUALIZED UPPER ABDOMEN: Unremarkable.    IMPRESSION:  1.  No CT evidence of an acute intrathoracic pathology.  2.  Nonspecific few prominent mediastinal lymph nodes, possibly reactive.        Pharmacologic DVT Prophylaxis: [X] YES, []NO: Contraindication:   [X] HEPARIN: Dose: 5000 u  Q24H    [] LOVENOX: Dose: XX mg  Q24H                 SCD's: YESb/l    GI Prophylaxis: Protonix [], Pepcid []    Pre-op ACEi/ARB/CCB held 24 hours prior to planned procedure: [X] Yes, [] NO: indication:  Pre-Op Beta-Blockers: [X]Yes, []No: contraindication:  Pre-Op Aspirin: [X]Yes,  []No: contraindication: [] Held for Pre-op cardiac valve surgery with no CAD  Pre-Op Statin: [X]Yes, []No: contraindication:    Ambulation/Activity Status:  5meter walk test: T1:  4    s/T2: 4    s/T3: 4    s        Cardiac Surgery Risk Factors  CVA and/or carotid/cerebrovascular disease. Yes  No  Explain if Yes  Aortoiliac disease Yes  No  Explain if Yes  Previous MI Yes  No  Explain if Yes  Previous Cardiac Surgery Yes  No  Explain if Yes  Hemodynamics-Unstable or Shock Yes  No  Explain if Yes  Diabetes Yes  No  Explain if Yes  Hepatic Failure Yes  No  Explain if Yes  Renal failure and/or dialysis Yes  No  Explain if Yes  Heart failure-type-present or past Yes  No  Explain if Yes  COPD Yes  No  Explain if Yes  Immune System Deficiency Yes  No  Explain if Yes  Malignant Ventricular Arrhythmia Yes  No  Explain if Yes    STS Score:     Assessment/Plan:  62y Female Pre-op for   - Case and plan discussed with CTU Intensivist and CT Surgeon - Dr. Moore/Devan/Dot/Castillo. STS risk score assessed and discussed risk with patient. Attending note to follow.  - Continue supportive care.    - Continue DVT/GI prophylaxis  - Incentive Spirometry 10 times an hour  - Continue to advance physical activity as tolerated and continue PT/OT as directed  1. CAD: Continue ASA, statin, BB  2. HTN:   3. A. Fib:   4. COPD/Hypoxia:   5. DM/Glucose Control:        PLANNED OPERATIVE PROCEDURE(s): CABG                                    SURGEON(s): MD Hartman    HPI:    61 y/o female with PMHx of HTN, HLD, COPD not on home O2, pre-DM, active smoker presented to the ED for chest tightness since 9am yesterday. She reports after breakfast she sat on the couch watching tv, when she suddenly developed substernal chest tightness with SOB and flushing. She got up to get her inhalers when she became dizzy prompting her ED visit yesterday. She described the chest tightness as non-radiating, substernal, improves on ambulation, worse on rest. She denies any similar episodes in the past. Denies any fevers, chills, abdominal pain, nausea, vomiting, diarrhea, constipation, LE swelling. Never had stress test before, no cardiac cath done. FH+ father  from MI at age 58. Today, patient underwent cardiac cath via right wrist which revealed left main disease, calcified LAD (80%), CX (90%), & RCA (60%)m and severe RPDA disease. CT surgery was consulted for possible myocardial revascularization via CABG.       SUBJECTIVE ASSESSMENT:62y Female patient seen and examined at bedside. No complaints at bedside.    Vital Signs Last 24 Hrs  T(F): 97.2 (22 Mar 2023 05:38), Max: 97.8 (21 Mar 2023 20:24)  HR: 77 (22 Mar 2023 05:38) (68 - 77)  BP: 132/60 (22 Mar 2023 05:38) (126/57 - 132/60)  BP(mean): 82 (21 Mar 2023 20:24) (82 - 82)  RR: 19 (21 Mar 2023 20:24) (19 - 19)  SpO2:     RUE SBP:                LUE SBP:      I&O's Detail    21 Mar 2023 07:  -  22 Mar 2023 07:00  --------------------------------------------------------  IN:    IV PiggyBack: 50 mL  Total IN: 50 mL    OUT:  Total OUT: 0 mL        Net: I&O's Detail    21 Mar 2023 07:  -  22 Mar 2023 07:00  --------------------------------------------------------  Total NET: 50 mL        CAPILLARY BLOOD GLUCOSE      POCT Blood Glucose.: 161 mg/dL (22 Mar 2023 10:58)  POCT Blood Glucose.: 117 mg/dL (22 Mar 2023 07:45)      Allergies    No Known Allergies    Intolerances        MEDICATIONS  (STANDING):  albuterol    90 MICROgram(s) HFA Inhaler 2 Puff(s) Inhalation every 6 hours  amLODIPine   Tablet 10 milliGRAM(s) Oral daily  aspirin enteric coated 81 milliGRAM(s) Oral daily  atorvastatin 40 milliGRAM(s) Oral at bedtime  carvedilol 6.25 milliGRAM(s) Oral every 12 hours  coronavirus bivalent (EUA) Booster Vaccine (PFIZER) 0.3 milliLiter(s) IntraMuscular once  enoxaparin Injectable 40 milliGRAM(s) SubCutaneous every 24 hours  pantoprazole    Tablet 40 milliGRAM(s) Oral every 12 hours  sodium chloride 0.9%. 1000 milliLiter(s) (75 mL/Hr) IV Continuous <Continuous>  tiotropium 2.5 MICROgram(s) Inhaler 2 Puff(s) Inhalation daily    MEDICATIONS  (PRN):    Home Medications:  Albuterol (Eqv-Ventolin HFA) 90 mcg/inh inhalation aerosol: 2 puff(s) inhaled every 6 hours (18 Mar 2023 15:42)  amLODIPine 10 mg oral tablet: 1 tab(s) orally once a day (18 Mar 2023 15:42)  Aspir 81 oral delayed release tablet: 1 tab(s) orally once a day (18 Mar 2023 15:42)  atorvastatin 40 mg oral tablet: 1 tab(s) orally once a day (18 Mar 2023 15:42)  carvedilol 6.25 mg oral tablet: 1 tab(s) orally 2 times a day (18 Mar 2023 15:41)  Combivent Respimat 20 mcg-100 mcg/inh inhalation aerosol: 1 puff(s) inhaled 4 times a day (18 Mar 2023 15:41)      Physical Exam:  General: NAD; A&Ox3  Cardiac: S1/S2, RRR, no murmur, no rubs  Lungs: unlabored respirations, CTA b/l, no wheeze, no rales, no crackles  Abdomen: Soft/NT/ND; positive bowel sounds x 4  Extremities: No edema b/l lower extremities; good capillary refill; no cyanosis; palpable 1+ pedal pulses b/l    BOWEL MOVEMENT:  [] YES [] NO, If No, Timing since last BM Day #        LABS:                        12.3   5.30  )-----------( 187      ( 22 Mar 2023 06:30 )             37.9                         11.7<L>  4.16<L> )-----------( 169      ( 21 Mar 2023 05:51 )             35.8<L>    03-22    140  |  104  |  11  ----------------------------<  114<H>  4.3   |  25  |  0.7  -21    144  |  110  |  12  ----------------------------<  115<H>  4.1   |  26  |  0.7    Ca    9.7      22 Mar 2023 06:30  Phos  3.5     03-22  Mg     1.9     -22    TPro  7.3 [6.0 - 8.0]  /  Alb  4.4 [3.5 - 5.2]  /  TBili  0.4 [0.2 - 1.2]  /  DBili  x   /  AST  15 [0 - 41]  /  ALT  12 [0 - 41]  /  AlkPhos  107 [30 - 115]  -          A1C with Estimated Average Glucose Result: 6.8 % (23 @ 06:45)      RADIOLOGY & ADDITIONAL TESTS:    CXR: < from: Xray Chest 2 Views PA/Lat (23 @ 11:21) >  PROCEDURE DATE:  2023      Findings:    Support devices: None.    Cardiac/mediastinum/hilum: Within normal limits.    Lung parenchyma/Pleura: Questionable retrocardiac opacity on lateral view   are pronounced than the comparison radiograph. No pneumothorax or pleural   effusion.    Skeleton/soft tissues:  No radiographically evident acute displaced   fracture within the limitations of this exam.    Impression:    Questionable retrocardiac opacity on lateral view is pronounced than the   comparison radiograph. No pneumothorax or pleural effusion. Consider   further evaluation with CT.      EKG:  < from: 12 Lead ECG (23 @ 07:43) >  Ventricular Rate 68 BPM    Atrial Rate 68 BPM    P-R Interval 176 ms    QRS Duration 94 ms    Q-T Interval 410 ms    QTC Calculation(Bazett) 435 ms    P Axis 64 degrees    R Axis 57 degrees    T Axis 96 degrees    Diagnosis Line Normal sinus rhythm  Nonspecific T wave abnormality  Abnormal ECG      Carotid Duplex: < from: VA Duplex Carotid, Bilat (23 @ 16:20) >  IMPRESSION:    Mild bilateralcarotid artery stenosis estimated 20-39%.  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       PFT's: FEV1: 80%    Echocardiogram: < from: TTE Echo Complete w/ Contrast w/ Doppler (23 @ 10:14) >  Summary:  1. Endocardial visualization was enhanced with intravenous echo contrast.   2. Left ventricular ejection fraction, by visual estimation, is 60 to   65%.   3. Normal global left ventricular systolic function.    PHYSICIAN INTERPRETATION:  Left Ventricle: Endocardial visualization was enhanced with intravenous   echo contrast. The left ventricular internal cavity size is mildly   increased. Left ventricular wall thickness is normal. Global LV systolic   function was normal. Left ventricular ejection fraction, by visual   estimation, is 60 to 65%. Spectral Doppler shows normal pattern of LV   diastolic filling.  Right Ventricle: Normal right ventricular size and function.  Left Atrium: Normal left atrial size.  Right Atrium: Normal right atrial size. RA Area is 11.33 cm² cm2.  Pericardium: Trivial pericardial effusion.  Mitral Valve: Structurally normal mitral valve with normal leaflet   excursion. No evidence of mitral stenosis. Trivial mitral regurgitation.  Tricuspid Valve: The tricuspid valve is grossly normal. No tricuspid   regurgitation visualized.  Aortic Valve: Normal trileaflet aortic valve with normal opening. No   evidence of aortic stenosis. No aortic regurgitation.  Pulmonic Valve: Trace pulmonic regurgitation.  Aorta: The aortic root and ascending aorta are normal in size.  Venous: The inferior vena cava was normal sized, with respiratory size   variation greater than 50%.      Cardiac catheterization: 3/22    FINDINGS:   Coronary Dominance: right  LM: Calcified. 50% distal stenosis.   LAD:  Calcified. 80% mLAD bifurcation stenosis with ostial D1 stenosis. Moderate disease in remainder of vessel.  CX:  Calcified. 90% ostial stenosis. Moderate disease in remainder of vessel.  RCA:  Calcified. 60% mRCA stenosis. Severe RPDA disease. 80% RPL stenosis.     LVEDP:  14 mmHg     EF: 60 % on TTE      CT CHEST: < from: CT Chest No Cont (23 @ 13:44) >  TECHNIQUE: Multislice helical sections were obtained from the thoracic   inlet to the lung bases without contrast administration. Coronal and   sagittal reformatted images and axial MIPs are also submitted.    COMPARISON: None.    FINDINGS:    TUBES/LINES: None.    LUNGS, PLEURA, AND AIRWAYS: Mild upper lung zone paraseptal emphysema. No   focal parenchymal opacities, pleural effusions or pneumothorax. Bibasilar   linear subsegmental atelectasis. Patent central airways.    MEDIASTINUM/LYMPH NODES: Nonspecific few prominent mediastinal lymph   nodes. For example, a right paratracheal lymph node measures 1.9 x 1.4   cm. Unremarkable visualized thyroid gland.    HEART/GREAT VESSELS: Normal heart size. Trace pericardial effusion.   Atherosclerotic vascular calcifications including coronary arteries.    BONES/SOFT TISSUES: Multilevel degenerative spine noted.    VISUALIZED UPPER ABDOMEN: Unremarkable.    IMPRESSION:  1.  No CT evidence of an acute intrathoracic pathology.  2.  Nonspecific few prominent mediastinal lymph nodes, possibly reactive.        Pharmacologic DVT Prophylaxis: [X] YES, []NO: Contraindication:   [X] HEPARIN: Dose: 5000 u  Q24H    [] LOVENOX: Dose: XX mg  Q24H                 SCD's: YESb/l    GI Prophylaxis: Protonix [], Pepcid []    Pre-op ACEi/ARB/CCB held 24 hours prior to planned procedure: [X] Yes, [] NO: indication:  Pre-Op Beta-Blockers: [X]Yes, []No: contraindication:  Pre-Op Aspirin: [X]Yes,  []No: contraindication: [] Held for Pre-op cardiac valve surgery with no CAD  Pre-Op Statin: [X]Yes, []No: contraindication:    Ambulation/Activity Status:  5meter walk test: T1:  4    s/T2: 4    s/T3: 4    s        Cardiac Surgery Risk Factors  CVA and/or carotid/cerebrovascular disease. Yes  No  Explain if Yes  Aortoiliac disease Yes  No  Explain if Yes  Previous MI Yes  No  Explain if Yes  Previous Cardiac Surgery Yes  No  Explain if Yes  Hemodynamics-Unstable or Shock Yes  No  Explain if Yes  Diabetes Yes  No  Explain if Yes  Hepatic Failure Yes  No  Explain if Yes  Renal failure and/or dialysis Yes  No  Explain if Yes  Heart failure-type-present or past Yes  No  Explain if Yes  COPD Yes  No  Explain if Yes  Immune System Deficiency Yes  No  Explain if Yes  Malignant Ventricular Arrhythmia Yes  No  Explain if Yes    STS Score:         Assessment/Plan:  62y Female Pre-op for   - Case and plan discussed with CTU Intensivist and CT Surgeon - Dr. Moore/Devan/Dot/Castillo. STS risk score assessed and discussed risk with patient. Attending note to follow.  - Continue supportive care.    - Continue DVT/GI prophylaxis  - Incentive Spirometry 10 times an hour  - Continue to advance physical activity as tolerated and continue PT/OT as directed  1. CAD: Continue ASA, statin, BB  2. HTN:   3. A. Fib:   4. COPD/Hypoxia:   5. DM/Glucose Control:        PLANNED OPERATIVE PROCEDURE(s): CABG                                    SURGEON(s): MD Hartman    HPI:    61 y/o female with PMHx of HTN, HLD, COPD not on home O2, pre-DM, active smoker presented to the ED for chest tightness since 9am yesterday. She reports after breakfast she sat on the couch watching tv, when she suddenly developed substernal chest tightness with SOB and flushing. She got up to get her inhalers when she became dizzy prompting her ED visit yesterday. She described the chest tightness as non-radiating, substernal, improves on ambulation, worse on rest. She denies any similar episodes in the past. Denies any fevers, chills, abdominal pain, nausea, vomiting, diarrhea, constipation, LE swelling. Never had stress test before, no cardiac cath done. FH+ father  from MI at age 58. Today, patient underwent cardiac cath via right wrist which revealed left main disease, calcified LAD (80%), CX (90%), & RCA (60%)m and severe RPDA disease. CT surgery was consulted for possible myocardial revascularization via CABG.       SUBJECTIVE ASSESSMENT:62y Female patient seen and examined at bedside. No complaints at bedside.    Vital Signs Last 24 Hrs  T(F): 97.2 (22 Mar 2023 05:38), Max: 97.8 (21 Mar 2023 20:24)  HR: 77 (22 Mar 2023 05:38) (68 - 77)  BP: 132/60 (22 Mar 2023 05:38) (126/57 - 132/60)  BP(mean): 82 (21 Mar 2023 20:24) (82 - 82)  RR: 19 (21 Mar 2023 20:24) (19 - 19)  SpO2:     RUE SBP:                LUE SBP:      I&O's Detail    21 Mar 2023 07:  -  22 Mar 2023 07:00  --------------------------------------------------------  IN:    IV PiggyBack: 50 mL  Total IN: 50 mL    OUT:  Total OUT: 0 mL        Net: I&O's Detail    21 Mar 2023 07:  -  22 Mar 2023 07:00  --------------------------------------------------------  Total NET: 50 mL        CAPILLARY BLOOD GLUCOSE      POCT Blood Glucose.: 161 mg/dL (22 Mar 2023 10:58)  POCT Blood Glucose.: 117 mg/dL (22 Mar 2023 07:45)      Allergies    No Known Allergies    Intolerances        MEDICATIONS  (STANDING):  albuterol    90 MICROgram(s) HFA Inhaler 2 Puff(s) Inhalation every 6 hours  amLODIPine   Tablet 10 milliGRAM(s) Oral daily  aspirin enteric coated 81 milliGRAM(s) Oral daily  atorvastatin 40 milliGRAM(s) Oral at bedtime  carvedilol 6.25 milliGRAM(s) Oral every 12 hours  coronavirus bivalent (EUA) Booster Vaccine (PFIZER) 0.3 milliLiter(s) IntraMuscular once  enoxaparin Injectable 40 milliGRAM(s) SubCutaneous every 24 hours  pantoprazole    Tablet 40 milliGRAM(s) Oral every 12 hours  sodium chloride 0.9%. 1000 milliLiter(s) (75 mL/Hr) IV Continuous <Continuous>  tiotropium 2.5 MICROgram(s) Inhaler 2 Puff(s) Inhalation daily    MEDICATIONS  (PRN):    Home Medications:  Albuterol (Eqv-Ventolin HFA) 90 mcg/inh inhalation aerosol: 2 puff(s) inhaled every 6 hours (18 Mar 2023 15:42)  amLODIPine 10 mg oral tablet: 1 tab(s) orally once a day (18 Mar 2023 15:42)  Aspir 81 oral delayed release tablet: 1 tab(s) orally once a day (18 Mar 2023 15:42)  atorvastatin 40 mg oral tablet: 1 tab(s) orally once a day (18 Mar 2023 15:42)  carvedilol 6.25 mg oral tablet: 1 tab(s) orally 2 times a day (18 Mar 2023 15:41)  Combivent Respimat 20 mcg-100 mcg/inh inhalation aerosol: 1 puff(s) inhaled 4 times a day (18 Mar 2023 15:41)      Physical Exam:  General: NAD; A&Ox3  Cardiac: S1/S2, RRR, no murmur, no rubs  Lungs: unlabored respirations, CTA b/l, no wheeze, no rales, no crackles  Abdomen: Soft/NT/ND; positive bowel sounds x 4  Extremities: No edema b/l lower extremities; good capillary refill; no cyanosis; palpable 1+ pedal pulses b/l    BOWEL MOVEMENT:  [] YES [] NO, If No, Timing since last BM Day #        LABS:                        12.3   5.30  )-----------( 187      ( 22 Mar 2023 06:30 )             37.9                         11.7<L>  4.16<L> )-----------( 169      ( 21 Mar 2023 05:51 )             35.8<L>    03-22    140  |  104  |  11  ----------------------------<  114<H>  4.3   |  25  |  0.7  -21    144  |  110  |  12  ----------------------------<  115<H>  4.1   |  26  |  0.7    Ca    9.7      22 Mar 2023 06:30  Phos  3.5     03-22  Mg     1.9     -22    TPro  7.3 [6.0 - 8.0]  /  Alb  4.4 [3.5 - 5.2]  /  TBili  0.4 [0.2 - 1.2]  /  DBili  x   /  AST  15 [0 - 41]  /  ALT  12 [0 - 41]  /  AlkPhos  107 [30 - 115]  -          A1C with Estimated Average Glucose Result: 6.8 % (23 @ 06:45)      RADIOLOGY & ADDITIONAL TESTS:    CXR: < from: Xray Chest 2 Views PA/Lat (23 @ 11:21) >  PROCEDURE DATE:  2023      Findings:    Support devices: None.    Cardiac/mediastinum/hilum: Within normal limits.    Lung parenchyma/Pleura: Questionable retrocardiac opacity on lateral view   are pronounced than the comparison radiograph. No pneumothorax or pleural   effusion.    Skeleton/soft tissues:  No radiographically evident acute displaced   fracture within the limitations of this exam.    Impression:    Questionable retrocardiac opacity on lateral view is pronounced than the   comparison radiograph. No pneumothorax or pleural effusion. Consider   further evaluation with CT.      EKG:  < from: 12 Lead ECG (23 @ 07:43) >  Ventricular Rate 68 BPM    Atrial Rate 68 BPM    P-R Interval 176 ms    QRS Duration 94 ms    Q-T Interval 410 ms    QTC Calculation(Bazett) 435 ms    P Axis 64 degrees    R Axis 57 degrees    T Axis 96 degrees    Diagnosis Line Normal sinus rhythm  Nonspecific T wave abnormality  Abnormal ECG      Carotid Duplex: < from: VA Duplex Carotid, Bilat (23 @ 16:20) >  IMPRESSION:    Mild bilateralcarotid artery stenosis estimated 20-39%.  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       PFT's: FEV1: 80%    Echocardiogram: < from: TTE Echo Complete w/ Contrast w/ Doppler (23 @ 10:14) >  Summary:  1. Endocardial visualization was enhanced with intravenous echo contrast.   2. Left ventricular ejection fraction, by visual estimation, is 60 to   65%.   3. Normal global left ventricular systolic function.    PHYSICIAN INTERPRETATION:  Left Ventricle: Endocardial visualization was enhanced with intravenous   echo contrast. The left ventricular internal cavity size is mildly   increased. Left ventricular wall thickness is normal. Global LV systolic   function was normal. Left ventricular ejection fraction, by visual   estimation, is 60 to 65%. Spectral Doppler shows normal pattern of LV   diastolic filling.  Right Ventricle: Normal right ventricular size and function.  Left Atrium: Normal left atrial size.  Right Atrium: Normal right atrial size. RA Area is 11.33 cm² cm2.  Pericardium: Trivial pericardial effusion.  Mitral Valve: Structurally normal mitral valve with normal leaflet   excursion. No evidence of mitral stenosis. Trivial mitral regurgitation.  Tricuspid Valve: The tricuspid valve is grossly normal. No tricuspid   regurgitation visualized.  Aortic Valve: Normal trileaflet aortic valve with normal opening. No   evidence of aortic stenosis. No aortic regurgitation.  Pulmonic Valve: Trace pulmonic regurgitation.  Aorta: The aortic root and ascending aorta are normal in size.  Venous: The inferior vena cava was normal sized, with respiratory size   variation greater than 50%.      Cardiac catheterization: 3/22    FINDINGS:   Coronary Dominance: right  LM: Calcified. 50% distal stenosis.   LAD:  Calcified. 80% mLAD bifurcation stenosis with ostial D1 stenosis. Moderate disease in remainder of vessel.  CX:  Calcified. 90% ostial stenosis. Moderate disease in remainder of vessel.  RCA:  Calcified. 60% mRCA stenosis. Severe RPDA disease. 80% RPL stenosis.     LVEDP:  14 mmHg     EF: 60 % on TTE      CT CHEST: < from: CT Chest No Cont (23 @ 13:44) >  TECHNIQUE: Multislice helical sections were obtained from the thoracic   inlet to the lung bases without contrast administration. Coronal and   sagittal reformatted images and axial MIPs are also submitted.    COMPARISON: None.    FINDINGS:    TUBES/LINES: None.    LUNGS, PLEURA, AND AIRWAYS: Mild upper lung zone paraseptal emphysema. No   focal parenchymal opacities, pleural effusions or pneumothorax. Bibasilar   linear subsegmental atelectasis. Patent central airways.    MEDIASTINUM/LYMPH NODES: Nonspecific few prominent mediastinal lymph   nodes. For example, a right paratracheal lymph node measures 1.9 x 1.4   cm. Unremarkable visualized thyroid gland.    HEART/GREAT VESSELS: Normal heart size. Trace pericardial effusion.   Atherosclerotic vascular calcifications including coronary arteries.    BONES/SOFT TISSUES: Multilevel degenerative spine noted.    VISUALIZED UPPER ABDOMEN: Unremarkable.    IMPRESSION:  1.  No CT evidence of an acute intrathoracic pathology.  2.  Nonspecific few prominent mediastinal lymph nodes, possibly reactive.        Pharmacologic DVT Prophylaxis: [X] YES, []NO: Contraindication:   [X] HEPARIN: Dose: 5000 u  Q24H    [] LOVENOX: Dose: XX mg  Q24H                 SCD's: YESb/l    GI Prophylaxis: Protonix [], Pepcid []    Pre-op ACEi/ARB/CCB held 24 hours prior to planned procedure: [X] Yes, [] NO: indication:  Pre-Op Beta-Blockers: [X]Yes, []No: contraindication:  Pre-Op Aspirin: [X]Yes,  []No: contraindication: [] Held for Pre-op cardiac valve surgery with no CAD  Pre-Op Statin: [X]Yes, []No: contraindication:    Ambulation/Activity Status:  5meter walk test: T1:  4    s/T2: 4    s/T3: 4    s        Cardiac Surgery Risk Factors  CVA and/or carotid/cerebrovascular disease. Yes  No  Explain if Yes  Aortoiliac disease Yes  No  Explain if Yes  Previous MI Yes  No  Explain if Yes  Previous Cardiac Surgery Yes  No  Explain if Yes  Hemodynamics-Unstable or Shock Yes  No  Explain if Yes  Diabetes Yes  No  Explain if Yes: patient is prediabetic, hbA1c 6.8  Hepatic Failure Yes  No  Explain if Yes  Renal failure and/or dialysis Yes  No  Explain if Yes  Heart failure-type-present or past Yes  No  Explain if Yes  COPD Yes  No  Explain if Yes  Immune System Deficiency Yes  No  Explain if Yes  Malignant Ventricular Arrhythmia Yes  No  Explain if Yes    STS Score:     Risk of Mortality: 0.895%    Renal Failure: 1.248%    Permanent Stroke: 1.868%    Prolonged Ventilation: 7.893%    DSW Infection: 0.262%    Reoperation: 1.363%    Morbidity or Mortality: 11.056%    Short Length of Stay: 41.763%    Long Length of Stay: 4.018%    Assessment/Plan:  62y Female Pre-op for CABG for tomorrow 3/22  - Case and plan discussed with CTU Intensivist and CT Surgeon - Dr. Chris. STS risk score assessed and discussed risk with patient. Attending note to follow.  - Continue supportive care.    - Continue DVT/GI prophylaxis  - Incentive Spirometry 10 times an hour  - Continue to advance physical activity as tolerated and continue PT/OT as directed  - CAD: Continue ASA, statin, BB, pre-op surgical clipping and surgical scrubs x3 for tomorrow  - NPO after midnight  - HTN: continue to monitor, carvedilol PO. please hold ACE/ARB/CC meds for AM cardiac surgery   - UTI: UA positive for lueks/nitrates- Ceftriaxone 1gm q24h x7 days, Urine cx ordered, ID?  - MRSA positive: Bactroban topical nares q12h  - Please call [phone] for any additional questions        PLANNED OPERATIVE PROCEDURE(s): CABG                                    SURGEON(s): MD Hartman    HPI:    61 y/o female with PMHx of HTN, HLD, COPD not on home O2, pre-DM, active smoker presented to the ED for chest tightness since 9am yesterday. She reports after breakfast she sat on the couch watching tv, when she suddenly developed substernal chest tightness with SOB and flushing. She got up to get her inhalers when she became dizzy prompting her ED visit yesterday. She described the chest tightness as non-radiating, substernal, improves on ambulation, worse on rest. She denies any similar episodes in the past. Denies any fevers, chills, abdominal pain, nausea, vomiting, diarrhea, constipation, LE swelling. Never had stress test before, no cardiac cath done. FH+ father  from MI at age 58. Today, patient underwent cardiac cath via right wrist which revealed left main disease, calcified LAD (80%), CX (90%), & RCA (60%)m and severe RPDA disease. CT surgery was consulted for possible myocardial revascularization via CABG.       SUBJECTIVE ASSESSMENT:62y Female patient seen and examined at bedside. No complaints at bedside.    Vital Signs Last 24 Hrs  T(F): 97.2 (22 Mar 2023 05:38), Max: 97.8 (21 Mar 2023 20:24)  HR: 77 (22 Mar 2023 05:38) (68 - 77)  BP: 132/60 (22 Mar 2023 05:38) (126/57 - 132/60)  BP(mean): 82 (21 Mar 2023 20:24) (82 - 82)  RR: 19 (21 Mar 2023 20:24) (19 - 19)  SpO2:     RUE SBP: 126/61               LUE SBP: 130/66      I&O's Detail    21 Mar 2023 07:  -  22 Mar 2023 07:00  --------------------------------------------------------  IN:    IV PiggyBack: 50 mL  Total IN: 50 mL    OUT:  Total OUT: 0 mL        Net: I&O's Detail    21 Mar 2023 07:01  -  22 Mar 2023 07:00  --------------------------------------------------------  Total NET: 50 mL        CAPILLARY BLOOD GLUCOSE      POCT Blood Glucose.: 161 mg/dL (22 Mar 2023 10:58)  POCT Blood Glucose.: 117 mg/dL (22 Mar 2023 07:45)      Allergies    No Known Allergies    Intolerances        MEDICATIONS  (STANDING):  albuterol    90 MICROgram(s) HFA Inhaler 2 Puff(s) Inhalation every 6 hours  amLODIPine   Tablet 10 milliGRAM(s) Oral daily  aspirin enteric coated 81 milliGRAM(s) Oral daily  atorvastatin 40 milliGRAM(s) Oral at bedtime  carvedilol 6.25 milliGRAM(s) Oral every 12 hours  coronavirus bivalent (EUA) Booster Vaccine (PFIZER) 0.3 milliLiter(s) IntraMuscular once  enoxaparin Injectable 40 milliGRAM(s) SubCutaneous every 24 hours  pantoprazole    Tablet 40 milliGRAM(s) Oral every 12 hours  sodium chloride 0.9%. 1000 milliLiter(s) (75 mL/Hr) IV Continuous <Continuous>  tiotropium 2.5 MICROgram(s) Inhaler 2 Puff(s) Inhalation daily    MEDICATIONS  (PRN):    Home Medications:  Albuterol (Eqv-Ventolin HFA) 90 mcg/inh inhalation aerosol: 2 puff(s) inhaled every 6 hours (18 Mar 2023 15:42)  amLODIPine 10 mg oral tablet: 1 tab(s) orally once a day (18 Mar 2023 15:42)  Aspir 81 oral delayed release tablet: 1 tab(s) orally once a day (18 Mar 2023 15:42)  atorvastatin 40 mg oral tablet: 1 tab(s) orally once a day (18 Mar 2023 15:42)  carvedilol 6.25 mg oral tablet: 1 tab(s) orally 2 times a day (18 Mar 2023 15:41)  Combivent Respimat 20 mcg-100 mcg/inh inhalation aerosol: 1 puff(s) inhaled 4 times a day (18 Mar 2023 15:41)      Physical Exam:  General: NAD; A&Ox3  Cardiac: S1/S2, RRR, no murmur, no rubs  Lungs: unlabored respirations, CTA b/l, no wheeze, no rales, no crackles  Abdomen: Soft/NT/ND; positive bowel sounds x 4  Extremities: No edema b/l lower extremities; good capillary refill; no cyanosis; palpable 1+ pedal pulses b/l    BOWEL MOVEMENT:  [] YES [] NO, If No, Timing since last BM Day #        LABS:                        12.3   5.30  )-----------( 187      ( 22 Mar 2023 06:30 )             37.9                         11.7<L>  4.16<L> )-----------( 169      ( 21 Mar 2023 05:51 )             35.8<L>    22    140  |  104  |  11  ----------------------------<  114<H>  4.3   |  25  |  0.7      144  |  110  |  12  ----------------------------<  115<H>  4.1   |  26  |  0.7    Ca    9.7      22 Mar 2023 06:30  Phos  3.5     03-22  Mg     1.9     -22    TPro  7.3 [6.0 - 8.0]  /  Alb  4.4 [3.5 - 5.2]  /  TBili  0.4 [0.2 - 1.2]  /  DBili  x   /  AST  15 [0 - 41]  /  ALT  12 [0 - 41]  /  AlkPhos  107 [30 - 115]            A1C with Estimated Average Glucose Result: 6.8 % (23 @ 06:45)      RADIOLOGY & ADDITIONAL TESTS:    CXR: < from: Xray Chest 2 Views PA/Lat (23 @ 11:21) >  PROCEDURE DATE:  2023      Findings:    Support devices: None.    Cardiac/mediastinum/hilum: Within normal limits.    Lung parenchyma/Pleura: Questionable retrocardiac opacity on lateral view   are pronounced than the comparison radiograph. No pneumothorax or pleural   effusion.    Skeleton/soft tissues:  No radiographically evident acute displaced   fracture within the limitations of this exam.    Impression:    Questionable retrocardiac opacity on lateral view is pronounced than the   comparison radiograph. No pneumothorax or pleural effusion. Consider   further evaluation with CT.      EKG:  < from: 12 Lead ECG (23 @ 07:43) >  Ventricular Rate 68 BPM    Atrial Rate 68 BPM    P-R Interval 176 ms    QRS Duration 94 ms    Q-T Interval 410 ms    QTC Calculation(Bazett) 435 ms    P Axis 64 degrees    R Axis 57 degrees    T Axis 96 degrees    Diagnosis Line Normal sinus rhythm  Nonspecific T wave abnormality  Abnormal ECG      Carotid Duplex: < from: VA Duplex Carotid, Bilat (23 @ 16:20) >  IMPRESSION:    Mild bilateralcarotid artery stenosis estimated 20-39%.  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       PFT's: FEV1: 80%    Echocardiogram: < from: TTE Echo Complete w/ Contrast w/ Doppler (23 @ 10:14) >  Summary:  1. Endocardial visualization was enhanced with intravenous echo contrast.   2. Left ventricular ejection fraction, by visual estimation, is 60 to   65%.   3. Normal global left ventricular systolic function.    PHYSICIAN INTERPRETATION:  Left Ventricle: Endocardial visualization was enhanced with intravenous   echo contrast. The left ventricular internal cavity size is mildly   increased. Left ventricular wall thickness is normal. Global LV systolic   function was normal. Left ventricular ejection fraction, by visual   estimation, is 60 to 65%. Spectral Doppler shows normal pattern of LV   diastolic filling.  Right Ventricle: Normal right ventricular size and function.  Left Atrium: Normal left atrial size.  Right Atrium: Normal right atrial size. RA Area is 11.33 cm² cm2.  Pericardium: Trivial pericardial effusion.  Mitral Valve: Structurally normal mitral valve with normal leaflet   excursion. No evidence of mitral stenosis. Trivial mitral regurgitation.  Tricuspid Valve: The tricuspid valve is grossly normal. No tricuspid   regurgitation visualized.  Aortic Valve: Normal trileaflet aortic valve with normal opening. No   evidence of aortic stenosis. No aortic regurgitation.  Pulmonic Valve: Trace pulmonic regurgitation.  Aorta: The aortic root and ascending aorta are normal in size.  Venous: The inferior vena cava was normal sized, with respiratory size   variation greater than 50%.      Cardiac catheterization: 3/22    FINDINGS:   Coronary Dominance: right  LM: Calcified. 50% distal stenosis.   LAD:  Calcified. 80% mLAD bifurcation stenosis with ostial D1 stenosis. Moderate disease in remainder of vessel.  CX:  Calcified. 90% ostial stenosis. Moderate disease in remainder of vessel.  RCA:  Calcified. 60% mRCA stenosis. Severe RPDA disease. 80% RPL stenosis.     LVEDP:  14 mmHg     EF: 60 % on TTE      CT CHEST: < from: CT Chest No Cont (23 @ 13:44) >  TECHNIQUE: Multislice helical sections were obtained from the thoracic   inlet to the lung bases without contrast administration. Coronal and   sagittal reformatted images and axial MIPs are also submitted.    COMPARISON: None.    FINDINGS:    TUBES/LINES: None.    LUNGS, PLEURA, AND AIRWAYS: Mild upper lung zone paraseptal emphysema. No   focal parenchymal opacities, pleural effusions or pneumothorax. Bibasilar   linear subsegmental atelectasis. Patent central airways.    MEDIASTINUM/LYMPH NODES: Nonspecific few prominent mediastinal lymph   nodes. For example, a right paratracheal lymph node measures 1.9 x 1.4   cm. Unremarkable visualized thyroid gland.    HEART/GREAT VESSELS: Normal heart size. Trace pericardial effusion.   Atherosclerotic vascular calcifications including coronary arteries.    BONES/SOFT TISSUES: Multilevel degenerative spine noted.    VISUALIZED UPPER ABDOMEN: Unremarkable.    IMPRESSION:  1.  No CT evidence of an acute intrathoracic pathology.  2.  Nonspecific few prominent mediastinal lymph nodes, possibly reactive.        Pharmacologic DVT Prophylaxis: [X] YES, []NO: Contraindication:   [X] HEPARIN: Dose: 5000 u  Q24H    [] LOVENOX: Dose: XX mg  Q24H                 SCD's: YESb/l    GI Prophylaxis: Protonix [], Pepcid []    Pre-op ACEi/ARB/CCB held 24 hours prior to planned procedure: [X] Yes, [] NO: indication:  Pre-Op Beta-Blockers: [X]Yes, []No: contraindication:  Pre-Op Aspirin: [X]Yes,  []No: contraindication: [] Held for Pre-op cardiac valve surgery with no CAD  Pre-Op Statin: [X]Yes, []No: contraindication:    Ambulation/Activity Status:  5meter walk test: T1:  4    s/T2: 4    s/T3: 4    s        Cardiac Surgery Risk Factors  CVA and/or carotid/cerebrovascular disease. Yes  No  Explain if Yes  Aortoiliac disease Yes  No  Explain if Yes  Previous MI Yes  No  Explain if Yes  Previous Cardiac Surgery Yes  No  Explain if Yes  Hemodynamics-Unstable or Shock Yes  No  Explain if Yes  Diabetes Yes  No  Explain if Yes: patient is prediabetic, hbA1c 6.8  Hepatic Failure Yes  No  Explain if Yes  Renal failure and/or dialysis Yes  No  Explain if Yes  Heart failure-type-present or past Yes  No  Explain if Yes  COPD Yes  No  Explain if Yes  Immune System Deficiency Yes  No  Explain if Yes  Malignant Ventricular Arrhythmia Yes  No  Explain if Yes    STS Score:     Risk of Mortality: 0.895%    Renal Failure: 1.248%    Permanent Stroke: 1.868%    Prolonged Ventilation: 7.893%    DSW Infection: 0.262%    Reoperation: 1.363%    Morbidity or Mortality: 11.056%    Short Length of Stay: 41.763%    Long Length of Stay: 4.018%    Assessment/Plan:  62y Female Pre-op for CABG for tomorrow 3/22  - Case and plan discussed with CTU Intensivist and CT Surgeon - Dr. Chris. STS risk score assessed and discussed risk with patient. Attending note to follow.  - Continue supportive care.    - Continue DVT/GI prophylaxis  - Incentive Spirometry 10 times an hour  - Continue to advance physical activity as tolerated and continue PT/OT as directed  - CAD: Continue ASA, statin, BB, pre-op surgical clipping and surgical scrubs x3 for tomorrow  - NPO after midnight  - HTN: continue to monitor, carvedilol PO. please hold ACE/ARB/CC meds for AM cardiac surgery   - UTI: UA positive for lueks/nitrates- Ceftriaxone 1gm q24h x7 days, Urine cx ordered, ID?  - MRSA positive: Bactroban topical nares q12h  - Please call [phone] for any additional questions

## 2023-03-22 NOTE — PROGRESS NOTE ADULT - SUBJECTIVE AND OBJECTIVE BOX
JENNIFER TORRES 62y Female  MRN#: [id number]   CODE STATUS:________    Hospital Day: 4d  admitted for cp cath with tripple disease cts for intervention tomorrow                                              OBJECTIVE  PAST MEDICAL & SURGICAL HISTORY  Hypertension    High cholesterol                                                ALLERGIES:  No Known Allergies                           HOME MEDICATIONS  Home Medications:  Albuterol (Eqv-Ventolin HFA) 90 mcg/inh inhalation aerosol: 2 puff(s) inhaled every 6 hours (18 Mar 2023 15:42)  amLODIPine 10 mg oral tablet: 1 tab(s) orally once a day (18 Mar 2023 15:42)  Aspir 81 oral delayed release tablet: 1 tab(s) orally once a day (18 Mar 2023 15:42)  atorvastatin 40 mg oral tablet: 1 tab(s) orally once a day (18 Mar 2023 15:42)  carvedilol 6.25 mg oral tablet: 1 tab(s) orally 2 times a day (18 Mar 2023 15:41)  Combivent Respimat 20 mcg-100 mcg/inh inhalation aerosol: 1 puff(s) inhaled 4 times a day (18 Mar 2023 15:41)                           MEDICATIONS:  STANDING MEDICATIONS  albuterol    90 MICROgram(s) HFA Inhaler 2 Puff(s) Inhalation every 6 hours  amLODIPine   Tablet 10 milliGRAM(s) Oral daily  aspirin enteric coated 81 milliGRAM(s) Oral daily  atorvastatin 40 milliGRAM(s) Oral at bedtime  carvedilol 6.25 milliGRAM(s) Oral every 12 hours  coronavirus bivalent (EUA) Booster Vaccine (PFIZER) 0.3 milliLiter(s) IntraMuscular once  enoxaparin Injectable 40 milliGRAM(s) SubCutaneous every 24 hours  pantoprazole    Tablet 40 milliGRAM(s) Oral every 12 hours  sodium chloride 0.9%. 1000 milliLiter(s) IV Continuous <Continuous>  tiotropium 2.5 MICROgram(s) Inhaler 2 Puff(s) Inhalation daily    PRN MEDICATIONS                                            ------------------------------------------------------------  VITAL SIGNS: Last 24 Hours  T(C): 36.2 (22 Mar 2023 05:38), Max: 36.6 (21 Mar 2023 20:24)  T(F): 97.2 (22 Mar 2023 05:38), Max: 97.8 (21 Mar 2023 20:24)  HR: 77 (22 Mar 2023 05:38) (68 - 77)  BP: 132/60 (22 Mar 2023 05:38) (126/57 - 132/60)  BP(mean): 82 (21 Mar 2023 20:24) (82 - 82)  RR: 19 (21 Mar 2023 20:24) (19 - 19)  SpO2: --      03-21-23 @ 07:01  -  03-22-23 @ 07:00  --------------------------------------------------------  IN: 50 mL / OUT: 0 mL / NET: 50 mL                                               LABS:                        12.3   5.30  )-----------( 187      ( 22 Mar 2023 06:30 )             37.9     03-22    140  |  104  |  11  ----------------------------<  114<H>  4.3   |  25  |  0.7    Ca    9.7      22 Mar 2023 06:30  Phos  3.5     03-22  Mg     1.9     03-22    TPro  7.3  /  Alb  4.4  /  TBili  0.4  /  DBili  x   /  AST  15  /  ALT  12  /  AlkPhos  107  03-22                                                              RADIOLOGY:        PHYSICAL EXAM:  GENERAL: NAD, lying in bed comfortably  HEAD:  Atraumatic, Normocephalic  EYES: EOMI, PERRLA, conjunctiva and sclera clear  ENT: Moist mucous membranes  NECK: Supple, No JVD  CHEST/LUNG: Clear to auscultation bilaterally; No rales, rhonchi, wheezing, or rubs. Unlabored respirations  HEART: Regular rate and rhythm; No murmurs, rubs, or gallops  ABDOMEN: BSx4; Soft, nontender, nondistended  EXTREMITIES:  2+ Peripheral Pulses, brisk capillary refill. No clubbing, cyanosis, or edema  NERVOUS SYSTEM:  A&Ox3, no focal deficits   SKIN: No rashes or lesions                                         ASSESSMENT & PLAN    Past medical history and hospital course       # Chest pain, ACS ruled out  - CCTA with high calcium socre 1504, high likelihood of CAD  - c/w telemetry  - NM stress test done, pending result, Cardiology evaluation if positive stress test  -s/p cath  tripple vs dz now for cts on thursday     # COPD not on home O2, stable   - c/w home inhalers    # Trace Pericardial Effusion incidentally noted on CCTA  - will obtain TTE to further assess    # Prominent Mediastinal LN, possibly reactive   - afebrile, no leukocytosis    # HTN  # HLD  - continue with amlodipine and atorvastatin 40mg   - will resume home coreg    # Pre-DM  - currently not on medications  - monitor FS, if FS>180 start basal/bolus    # Active smoker  - discussed smoking cessation

## 2023-03-22 NOTE — PROGRESS NOTE ADULT - SUBJECTIVE AND OBJECTIVE BOX
JENNIFER TORRES  62y Female    CHIEF COMPLAINT:    Patient is a 62y old  Female who presents with a chief complaint of cabg (22 Mar 2023 13:27)      INTERVAL HPI/OVERNIGHT EVENTS:    Patient seen and examined. No cp. No sob. Scheduled for CABG tomorrow.     ROS: All other systems are negative.    Vital Signs:    T(F): 97.2 (23 @ 13:58), Max: 97.8 (23 @ 20:24)  HR: 64 (23 @ 13:58) (64 - 77)  BP: 115/63 (23 @ 13:58) (115/63 - 132/60)  RR: 18 (23 @ 13:58) (18 - 19)  SpO2: --  I&O's Summary    21 Mar 2023 07:01  -  22 Mar 2023 07:00  --------------------------------------------------------  IN: 50 mL / OUT: 0 mL / NET: 50 mL    22 Mar 2023 07:01  -  22 Mar 2023 14:22  --------------------------------------------------------  IN: 486 mL / OUT: 0 mL / NET: 486 mL      Daily     Daily Weight in k (22 Mar 2023 05:38)  CAPILLARY BLOOD GLUCOSE      POCT Blood Glucose.: 161 mg/dL (22 Mar 2023 10:58)  POCT Blood Glucose.: 117 mg/dL (22 Mar 2023 07:45)      PHYSICAL EXAM:    GENERAL:  NAD  SKIN: No rashes or lesions  HENT: Atraumatic. Normocephalic. PERRL. Moist membranes.  NECK: Supple, No JVD. No lymphadenopathy.  PULMONARY: CTA B/L. No wheezing. No rales  CVS: Normal S1, S2. Rate and Rhythm are regular. No murmurs.  ABDOMEN/GI: Soft, Nontender, Nondistended; BS present  EXTREMITIES: Peripheral pulses intact. No edema B/L LE.  NEUROLOGIC:  No motor or sensory deficit.  PSYCH: Alert & oriented x 3    Consultant(s) Notes Reviewed:  [x ] YES  [ ] NO  Care Discussed with Consultants/Other Providers [ x] YES  [ ] NO    EKG reviewed  Telemetry reviewed    LABS:                        12.3   5.30  )-----------( 187      ( 22 Mar 2023 06:30 )             37.9     03-    140  |  104  |  11  ----------------------------<  114<H>  4.3   |  25  |  0.7    Ca    9.7      22 Mar 2023 06:30  Phos  3.5     -  Mg     1.9         TPro  7.3  /  Alb  4.4  /  TBili  0.4  /  DBili  x   /  AST  15  /  ALT  12  /  AlkPhos  107                RADIOLOGY & ADDITIONAL TESTS:      Imaging or report Personally Reviewed:  [ ] YES  [ ] NO    Medications:  Standing  albuterol    90 MICROgram(s) HFA Inhaler 2 Puff(s) Inhalation every 6 hours  amLODIPine   Tablet 10 milliGRAM(s) Oral daily  aspirin enteric coated 81 milliGRAM(s) Oral daily  atorvastatin 40 milliGRAM(s) Oral at bedtime  carvedilol 6.25 milliGRAM(s) Oral every 12 hours  coronavirus bivalent (EUA) Booster Vaccine (PFIZER) 0.3 milliLiter(s) IntraMuscular once  enoxaparin Injectable 40 milliGRAM(s) SubCutaneous every 24 hours  mupirocin 2% Nasal 1 Application(s) Both Nostrils two times a day  pantoprazole    Tablet 40 milliGRAM(s) Oral every 12 hours  sodium chloride 0.9%. 1000 milliLiter(s) IV Continuous <Continuous>  tiotropium 2.5 MICROgram(s) Inhaler 2 Puff(s) Inhalation daily    PRN Meds      Case discussed with resident    Care discussed with pt/family

## 2023-03-22 NOTE — PROGRESS NOTE ADULT - NS ATTEND AMEND GEN_ALL_CORE FT
CT surgery attending note    The patient is a 62-year-old lady we had seen in detail in consultation a couple of days ago when she presented with a non-ST elevation myocardial infarction and heavily calcified severe triple-vessel coronary artery disease in the setting of multiple medical issues as described above.    The patient was undergoing a full preoperative work-up and now wants to proceed with open heart surgery and coronary artery bypass grafting.  I once again discussed all the issues with the patient and her family. I discussed the risks, benefits, and alternatives to the surgical procedure in detail.  The risks that we discussed included but were not limited to bleeding, infection, stroke, myocardial infarction, renal failure, multi-organ failure, death, etc. amongst others were discussed in detail.  All questions. were answered.  The patient and the family want to proceed with the high risk intervention.

## 2023-03-23 ENCOUNTER — TRANSCRIPTION ENCOUNTER (OUTPATIENT)
Age: 63
End: 2023-03-23

## 2023-03-23 ENCOUNTER — RESULT REVIEW (OUTPATIENT)
Age: 63
End: 2023-03-23

## 2023-03-23 LAB
ALBUMIN SERPL ELPH-MCNC: 3.6 G/DL — SIGNIFICANT CHANGE UP (ref 3.5–5.2)
ALBUMIN SERPL ELPH-MCNC: 4.1 G/DL — SIGNIFICANT CHANGE UP (ref 3.5–5.2)
ALP SERPL-CCNC: 100 U/L — SIGNIFICANT CHANGE UP (ref 30–115)
ALP SERPL-CCNC: 86 U/L — SIGNIFICANT CHANGE UP (ref 30–115)
ALT FLD-CCNC: 14 U/L — SIGNIFICANT CHANGE UP (ref 0–41)
ALT FLD-CCNC: 16 U/L — SIGNIFICANT CHANGE UP (ref 0–41)
ANION GAP SERPL CALC-SCNC: 9 MMOL/L — SIGNIFICANT CHANGE UP (ref 7–14)
ANION GAP SERPL CALC-SCNC: 9 MMOL/L — SIGNIFICANT CHANGE UP (ref 7–14)
APTT BLD: 31.3 SEC — SIGNIFICANT CHANGE UP (ref 27–39.2)
AST SERPL-CCNC: 18 U/L — SIGNIFICANT CHANGE UP (ref 0–41)
AST SERPL-CCNC: 40 U/L — SIGNIFICANT CHANGE UP (ref 0–41)
BASE EXCESS BLDV CALC-SCNC: -0.5 MMOL/L — SIGNIFICANT CHANGE UP (ref -2–3)
BASOPHILS # BLD AUTO: 0.02 K/UL — SIGNIFICANT CHANGE UP (ref 0–0.2)
BASOPHILS NFR BLD AUTO: 0.2 % — SIGNIFICANT CHANGE UP (ref 0–1)
BASOPHILS NFR BLD AUTO: 0.4 % — SIGNIFICANT CHANGE UP (ref 0–1)
BASOPHILS NFR BLD AUTO: 0.4 % — SIGNIFICANT CHANGE UP (ref 0–1)
BILIRUB SERPL-MCNC: 0.3 MG/DL — SIGNIFICANT CHANGE UP (ref 0.2–1.2)
BILIRUB SERPL-MCNC: 0.6 MG/DL — SIGNIFICANT CHANGE UP (ref 0.2–1.2)
BUN SERPL-MCNC: 8 MG/DL — LOW (ref 10–20)
BUN SERPL-MCNC: 9 MG/DL — LOW (ref 10–20)
CA-I SERPL-SCNC: 1.2 MMOL/L — SIGNIFICANT CHANGE UP (ref 1.15–1.33)
CALCIUM SERPL-MCNC: 8.6 MG/DL — SIGNIFICANT CHANGE UP (ref 8.4–10.5)
CALCIUM SERPL-MCNC: 9.4 MG/DL — SIGNIFICANT CHANGE UP (ref 8.4–10.4)
CHLORIDE SERPL-SCNC: 105 MMOL/L — SIGNIFICANT CHANGE UP (ref 98–110)
CHLORIDE SERPL-SCNC: 112 MMOL/L — HIGH (ref 98–110)
CO2 SERPL-SCNC: 22 MMOL/L — SIGNIFICANT CHANGE UP (ref 17–32)
CO2 SERPL-SCNC: 25 MMOL/L — SIGNIFICANT CHANGE UP (ref 17–32)
CREAT SERPL-MCNC: 0.6 MG/DL — LOW (ref 0.7–1.5)
CREAT SERPL-MCNC: 0.6 MG/DL — LOW (ref 0.7–1.5)
EGFR: 100 ML/MIN/1.73M2 — SIGNIFICANT CHANGE UP
EGFR: 100 ML/MIN/1.73M2 — SIGNIFICANT CHANGE UP
EOSINOPHIL # BLD AUTO: 0.04 K/UL — SIGNIFICANT CHANGE UP (ref 0–0.7)
EOSINOPHIL # BLD AUTO: 0.07 K/UL — SIGNIFICANT CHANGE UP (ref 0–0.7)
EOSINOPHIL # BLD AUTO: 0.22 K/UL — SIGNIFICANT CHANGE UP (ref 0–0.7)
EOSINOPHIL NFR BLD AUTO: 0.4 % — SIGNIFICANT CHANGE UP (ref 0–8)
EOSINOPHIL NFR BLD AUTO: 1.4 % — SIGNIFICANT CHANGE UP (ref 0–8)
EOSINOPHIL NFR BLD AUTO: 4.9 % — SIGNIFICANT CHANGE UP (ref 0–8)
GAS PNL BLDA: SIGNIFICANT CHANGE UP
GAS PNL BLDV: 138 MMOL/L — SIGNIFICANT CHANGE UP (ref 136–145)
GAS PNL BLDV: SIGNIFICANT CHANGE UP
GLUCOSE BLDC GLUCOMTR-MCNC: 101 MG/DL — HIGH (ref 70–99)
GLUCOSE BLDC GLUCOMTR-MCNC: 108 MG/DL — HIGH (ref 70–99)
GLUCOSE BLDC GLUCOMTR-MCNC: 120 MG/DL — HIGH (ref 70–99)
GLUCOSE BLDC GLUCOMTR-MCNC: 132 MG/DL — HIGH (ref 70–99)
GLUCOSE BLDC GLUCOMTR-MCNC: 144 MG/DL — HIGH (ref 70–99)
GLUCOSE BLDC GLUCOMTR-MCNC: 151 MG/DL — HIGH (ref 70–99)
GLUCOSE BLDC GLUCOMTR-MCNC: 151 MG/DL — HIGH (ref 70–99)
GLUCOSE BLDC GLUCOMTR-MCNC: 155 MG/DL — HIGH (ref 70–99)
GLUCOSE BLDC GLUCOMTR-MCNC: 159 MG/DL — HIGH (ref 70–99)
GLUCOSE SERPL-MCNC: 100 MG/DL — HIGH (ref 70–99)
GLUCOSE SERPL-MCNC: 145 MG/DL — HIGH (ref 70–99)
GRAM STN FLD: SIGNIFICANT CHANGE UP
HCO3 BLDV-SCNC: 25 MMOL/L — SIGNIFICANT CHANGE UP (ref 22–29)
HCT VFR BLD CALC: 22.8 % — LOW (ref 37–47)
HCT VFR BLD CALC: 34.6 % — LOW (ref 37–47)
HCT VFR BLD CALC: 35.8 % — LOW (ref 37–47)
HCT VFR BLDA CALC: 35 % — LOW (ref 39–51)
HGB BLD CALC-MCNC: 11.7 G/DL — LOW (ref 12.6–17.4)
HGB BLD-MCNC: 11.7 G/DL — LOW (ref 12–16)
HGB BLD-MCNC: 11.7 G/DL — LOW (ref 12–16)
HGB BLD-MCNC: 7.6 G/DL — LOW (ref 12–16)
HOROWITZ INDEX BLDV+IHG-RTO: 50 — SIGNIFICANT CHANGE UP
IMM GRANULOCYTES NFR BLD AUTO: 0.2 % — SIGNIFICANT CHANGE UP (ref 0.1–0.3)
IMM GRANULOCYTES NFR BLD AUTO: 1.2 % — HIGH (ref 0.1–0.3)
IMM GRANULOCYTES NFR BLD AUTO: 1.6 % — HIGH (ref 0.1–0.3)
INR BLD: 1.21 RATIO — SIGNIFICANT CHANGE UP (ref 0.65–1.3)
LACTATE BLDV-MCNC: 1.3 MMOL/L — SIGNIFICANT CHANGE UP (ref 0.5–2)
LYMPHOCYTES # BLD AUTO: 1.54 K/UL — SIGNIFICANT CHANGE UP (ref 1.2–3.4)
LYMPHOCYTES # BLD AUTO: 1.68 K/UL — SIGNIFICANT CHANGE UP (ref 1.2–3.4)
LYMPHOCYTES # BLD AUTO: 1.93 K/UL — SIGNIFICANT CHANGE UP (ref 1.2–3.4)
LYMPHOCYTES # BLD AUTO: 16.8 % — LOW (ref 20.5–51.1)
LYMPHOCYTES # BLD AUTO: 30.7 % — SIGNIFICANT CHANGE UP (ref 20.5–51.1)
LYMPHOCYTES # BLD AUTO: 42.7 % — SIGNIFICANT CHANGE UP (ref 20.5–51.1)
MAGNESIUM SERPL-MCNC: 1.7 MG/DL — LOW (ref 1.8–2.4)
MAGNESIUM SERPL-MCNC: 2.8 MG/DL — HIGH (ref 1.8–2.4)
MCHC RBC-ENTMCNC: 30.6 PG — SIGNIFICANT CHANGE UP (ref 27–31)
MCHC RBC-ENTMCNC: 32.7 G/DL — SIGNIFICANT CHANGE UP (ref 32–37)
MCHC RBC-ENTMCNC: 33.3 G/DL — SIGNIFICANT CHANGE UP (ref 32–37)
MCHC RBC-ENTMCNC: 33.8 G/DL — SIGNIFICANT CHANGE UP (ref 32–37)
MCV RBC AUTO: 90.6 FL — SIGNIFICANT CHANGE UP (ref 81–99)
MCV RBC AUTO: 91.9 FL — SIGNIFICANT CHANGE UP (ref 81–99)
MCV RBC AUTO: 93.7 FL — SIGNIFICANT CHANGE UP (ref 81–99)
MONOCYTES # BLD AUTO: 0.12 K/UL — SIGNIFICANT CHANGE UP (ref 0.1–0.6)
MONOCYTES # BLD AUTO: 0.43 K/UL — SIGNIFICANT CHANGE UP (ref 0.1–0.6)
MONOCYTES # BLD AUTO: 0.69 K/UL — HIGH (ref 0.1–0.6)
MONOCYTES NFR BLD AUTO: 2.4 % — SIGNIFICANT CHANGE UP (ref 1.7–9.3)
MONOCYTES NFR BLD AUTO: 6.9 % — SIGNIFICANT CHANGE UP (ref 1.7–9.3)
MONOCYTES NFR BLD AUTO: 9.5 % — HIGH (ref 1.7–9.3)
NEUTROPHILS # BLD AUTO: 1.91 K/UL — SIGNIFICANT CHANGE UP (ref 1.4–6.5)
NEUTROPHILS # BLD AUTO: 3.2 K/UL — SIGNIFICANT CHANGE UP (ref 1.4–6.5)
NEUTROPHILS # BLD AUTO: 7.42 K/UL — HIGH (ref 1.4–6.5)
NEUTROPHILS NFR BLD AUTO: 42.3 % — SIGNIFICANT CHANGE UP (ref 42.2–75.2)
NEUTROPHILS NFR BLD AUTO: 63.9 % — SIGNIFICANT CHANGE UP (ref 42.2–75.2)
NEUTROPHILS NFR BLD AUTO: 74.1 % — SIGNIFICANT CHANGE UP (ref 42.2–75.2)
NRBC # BLD: 0 /100 WBCS — SIGNIFICANT CHANGE UP (ref 0–0)
PCO2 BLDV: 43 MMHG — HIGH (ref 39–42)
PH BLDV: 7.37 — SIGNIFICANT CHANGE UP (ref 7.32–7.43)
PHOSPHATE SERPL-MCNC: 3.5 MG/DL — SIGNIFICANT CHANGE UP (ref 2.1–4.9)
PLATELET # BLD AUTO: 104 K/UL — LOW (ref 130–400)
PLATELET # BLD AUTO: 115 K/UL — LOW (ref 130–400)
PLATELET # BLD AUTO: 166 K/UL — SIGNIFICANT CHANGE UP (ref 130–400)
PO2 BLDV: 41 MMHG — SIGNIFICANT CHANGE UP
POTASSIUM BLDV-SCNC: 4.1 MMOL/L — SIGNIFICANT CHANGE UP (ref 3.5–5.1)
POTASSIUM SERPL-MCNC: 4.1 MMOL/L — SIGNIFICANT CHANGE UP (ref 3.5–5)
POTASSIUM SERPL-MCNC: 4.4 MMOL/L — SIGNIFICANT CHANGE UP (ref 3.5–5)
POTASSIUM SERPL-SCNC: 4.1 MMOL/L — SIGNIFICANT CHANGE UP (ref 3.5–5)
POTASSIUM SERPL-SCNC: 4.4 MMOL/L — SIGNIFICANT CHANGE UP (ref 3.5–5)
PROT SERPL-MCNC: 6 G/DL — SIGNIFICANT CHANGE UP (ref 6–8)
PROT SERPL-MCNC: 6.9 G/DL — SIGNIFICANT CHANGE UP (ref 6–8)
PROTHROM AB SERPL-ACNC: 13.9 SEC — HIGH (ref 9.95–12.87)
RBC # BLD: 2.48 M/UL — LOW (ref 4.2–5.4)
RBC # BLD: 3.82 M/UL — LOW (ref 4.2–5.4)
RBC # BLD: 3.82 M/UL — LOW (ref 4.2–5.4)
RBC # FLD: 13.3 % — SIGNIFICANT CHANGE UP (ref 11.5–14.5)
SAO2 % BLDV: 70.7 % — SIGNIFICANT CHANGE UP
SODIUM SERPL-SCNC: 139 MMOL/L — SIGNIFICANT CHANGE UP (ref 135–146)
SODIUM SERPL-SCNC: 143 MMOL/L — SIGNIFICANT CHANGE UP (ref 135–146)
SPECIMEN SOURCE: SIGNIFICANT CHANGE UP
WBC # BLD: 10.01 K/UL — SIGNIFICANT CHANGE UP (ref 4.8–10.8)
WBC # BLD: 4.52 K/UL — LOW (ref 4.8–10.8)
WBC # BLD: 5.01 K/UL — SIGNIFICANT CHANGE UP (ref 4.8–10.8)
WBC # FLD AUTO: 10.01 K/UL — SIGNIFICANT CHANGE UP (ref 4.8–10.8)
WBC # FLD AUTO: 4.52 K/UL — LOW (ref 4.8–10.8)
WBC # FLD AUTO: 5.01 K/UL — SIGNIFICANT CHANGE UP (ref 4.8–10.8)

## 2023-03-23 PROCEDURE — 33519 CABG ARTERY-VEIN THREE: CPT

## 2023-03-23 PROCEDURE — 33533 CABG ARTERIAL SINGLE: CPT | Mod: AS

## 2023-03-23 PROCEDURE — 71045 X-RAY EXAM CHEST 1 VIEW: CPT | Mod: 26

## 2023-03-23 PROCEDURE — 33533 CABG ARTERIAL SINGLE: CPT

## 2023-03-23 PROCEDURE — 88307 TISSUE EXAM BY PATHOLOGIST: CPT | Mod: 26

## 2023-03-23 PROCEDURE — 33519 CABG ARTERY-VEIN THREE: CPT | Mod: AS

## 2023-03-23 PROCEDURE — 93010 ELECTROCARDIOGRAM REPORT: CPT

## 2023-03-23 PROCEDURE — 33508 ENDOSCOPIC VEIN HARVEST: CPT | Mod: 59

## 2023-03-23 RX ORDER — ACETAMINOPHEN 500 MG
1000 TABLET ORAL ONCE
Refills: 0 | Status: COMPLETED | OUTPATIENT
Start: 2023-03-24 | End: 2023-03-24

## 2023-03-23 RX ORDER — DEXTROSE 50 % IN WATER 50 %
50 SYRINGE (ML) INTRAVENOUS
Refills: 0 | Status: DISCONTINUED | OUTPATIENT
Start: 2023-03-23 | End: 2023-03-29

## 2023-03-23 RX ORDER — FENTANYL CITRATE 50 UG/ML
25 INJECTION INTRAVENOUS
Refills: 0 | Status: DISCONTINUED | OUTPATIENT
Start: 2023-03-23 | End: 2023-03-24

## 2023-03-23 RX ORDER — ALBUTEROL 90 UG/1
2 AEROSOL, METERED ORAL EVERY 6 HOURS
Refills: 0 | Status: DISCONTINUED | OUTPATIENT
Start: 2023-03-23 | End: 2023-03-28

## 2023-03-23 RX ORDER — SODIUM CHLORIDE 9 MG/ML
1000 INJECTION, SOLUTION INTRAVENOUS
Refills: 0 | Status: DISCONTINUED | OUTPATIENT
Start: 2023-03-23 | End: 2023-03-29

## 2023-03-23 RX ORDER — POTASSIUM CHLORIDE 20 MEQ
20 PACKET (EA) ORAL ONCE
Refills: 0 | Status: COMPLETED | OUTPATIENT
Start: 2023-03-23 | End: 2023-03-23

## 2023-03-23 RX ORDER — DEXTROSE 50 % IN WATER 50 %
15 SYRINGE (ML) INTRAVENOUS ONCE
Refills: 0 | Status: DISCONTINUED | OUTPATIENT
Start: 2023-03-23 | End: 2023-03-29

## 2023-03-23 RX ORDER — GLUCAGON INJECTION, SOLUTION 0.5 MG/.1ML
1 INJECTION, SOLUTION SUBCUTANEOUS ONCE
Refills: 0 | Status: DISCONTINUED | OUTPATIENT
Start: 2023-03-23 | End: 2023-03-29

## 2023-03-23 RX ORDER — VANCOMYCIN HCL 1 G
1000 VIAL (EA) INTRAVENOUS EVERY 12 HOURS
Refills: 0 | Status: COMPLETED | OUTPATIENT
Start: 2023-03-24 | End: 2023-03-25

## 2023-03-23 RX ORDER — CHLORHEXIDINE GLUCONATE 213 G/1000ML
5 SOLUTION TOPICAL
Refills: 0 | Status: DISCONTINUED | OUTPATIENT
Start: 2023-03-23 | End: 2023-03-23

## 2023-03-23 RX ORDER — NICARDIPINE HYDROCHLORIDE 30 MG/1
5 CAPSULE, EXTENDED RELEASE ORAL
Qty: 40 | Refills: 0 | Status: DISCONTINUED | OUTPATIENT
Start: 2023-03-23 | End: 2023-03-24

## 2023-03-23 RX ORDER — IPRATROPIUM BROMIDE 0.2 MG/ML
2 SOLUTION, NON-ORAL INHALATION EVERY 6 HOURS
Refills: 0 | Status: DISCONTINUED | OUTPATIENT
Start: 2023-03-23 | End: 2023-03-28

## 2023-03-23 RX ORDER — DEXMEDETOMIDINE HYDROCHLORIDE IN 0.9% SODIUM CHLORIDE 4 UG/ML
0.2 INJECTION INTRAVENOUS
Qty: 200 | Refills: 0 | Status: DISCONTINUED | OUTPATIENT
Start: 2023-03-23 | End: 2023-03-24

## 2023-03-23 RX ORDER — ACETAMINOPHEN 500 MG
1000 TABLET ORAL ONCE
Refills: 0 | Status: COMPLETED | OUTPATIENT
Start: 2023-03-25 | End: 2023-03-25

## 2023-03-23 RX ORDER — ASPIRIN/CALCIUM CARB/MAGNESIUM 324 MG
325 TABLET ORAL ONCE
Refills: 0 | Status: COMPLETED | OUTPATIENT
Start: 2023-03-23 | End: 2023-03-24

## 2023-03-23 RX ORDER — DEXTROSE 50 % IN WATER 50 %
12.5 SYRINGE (ML) INTRAVENOUS ONCE
Refills: 0 | Status: DISCONTINUED | OUTPATIENT
Start: 2023-03-23 | End: 2023-03-29

## 2023-03-23 RX ORDER — VASOPRESSIN 20 [USP'U]/ML
0.04 INJECTION INTRAVENOUS
Qty: 40 | Refills: 0 | Status: DISCONTINUED | OUTPATIENT
Start: 2023-03-23 | End: 2023-03-24

## 2023-03-23 RX ORDER — AMIODARONE HYDROCHLORIDE 400 MG/1
1 TABLET ORAL
Qty: 450 | Refills: 0 | Status: DISCONTINUED | OUTPATIENT
Start: 2023-03-23 | End: 2023-03-27

## 2023-03-23 RX ORDER — POLYETHYLENE GLYCOL 3350 17 G/17G
17 POWDER, FOR SOLUTION ORAL DAILY
Refills: 0 | Status: DISCONTINUED | OUTPATIENT
Start: 2023-03-24 | End: 2023-03-29

## 2023-03-23 RX ORDER — DOBUTAMINE HCL 250MG/20ML
3 VIAL (ML) INTRAVENOUS
Qty: 500 | Refills: 0 | Status: DISCONTINUED | OUTPATIENT
Start: 2023-03-23 | End: 2023-03-27

## 2023-03-23 RX ORDER — SENNA PLUS 8.6 MG/1
2 TABLET ORAL AT BEDTIME
Refills: 0 | Status: DISCONTINUED | OUTPATIENT
Start: 2023-03-24 | End: 2023-03-29

## 2023-03-23 RX ORDER — INSULIN HUMAN 100 [IU]/ML
1 INJECTION, SOLUTION SUBCUTANEOUS
Qty: 100 | Refills: 0 | Status: DISCONTINUED | OUTPATIENT
Start: 2023-03-23 | End: 2023-03-26

## 2023-03-23 RX ORDER — NOREPINEPHRINE BITARTRATE/D5W 8 MG/250ML
0.05 PLASTIC BAG, INJECTION (ML) INTRAVENOUS
Qty: 8 | Refills: 0 | Status: DISCONTINUED | OUTPATIENT
Start: 2023-03-23 | End: 2023-03-24

## 2023-03-23 RX ORDER — SODIUM CHLORIDE 9 MG/ML
1000 INJECTION INTRAMUSCULAR; INTRAVENOUS; SUBCUTANEOUS
Refills: 0 | Status: DISCONTINUED | OUTPATIENT
Start: 2023-03-23 | End: 2023-03-28

## 2023-03-23 RX ORDER — DEXTROSE 50 % IN WATER 50 %
25 SYRINGE (ML) INTRAVENOUS ONCE
Refills: 0 | Status: DISCONTINUED | OUTPATIENT
Start: 2023-03-23 | End: 2023-03-29

## 2023-03-23 RX ORDER — ACETAMINOPHEN 500 MG
1000 TABLET ORAL ONCE
Refills: 0 | Status: COMPLETED | OUTPATIENT
Start: 2023-03-23 | End: 2023-03-23

## 2023-03-23 RX ORDER — ASPIRIN/CALCIUM CARB/MAGNESIUM 324 MG
325 TABLET ORAL DAILY
Refills: 0 | Status: DISCONTINUED | OUTPATIENT
Start: 2023-03-24 | End: 2023-03-29

## 2023-03-23 RX ORDER — DEXTROSE 50 % IN WATER 50 %
25 SYRINGE (ML) INTRAVENOUS
Refills: 0 | Status: DISCONTINUED | OUTPATIENT
Start: 2023-03-23 | End: 2023-03-29

## 2023-03-23 RX ORDER — NITROGLYCERIN 6.5 MG
5 CAPSULE, EXTENDED RELEASE ORAL
Qty: 50 | Refills: 0 | Status: DISCONTINUED | OUTPATIENT
Start: 2023-03-23 | End: 2023-03-27

## 2023-03-23 RX ORDER — CEFAZOLIN SODIUM 1 G
1000 VIAL (EA) INJECTION EVERY 8 HOURS
Refills: 0 | Status: COMPLETED | OUTPATIENT
Start: 2023-03-23 | End: 2023-03-25

## 2023-03-23 RX ORDER — ALBUMIN HUMAN 25 %
500 VIAL (ML) INTRAVENOUS ONCE
Refills: 0 | Status: COMPLETED | OUTPATIENT
Start: 2023-03-23 | End: 2023-03-23

## 2023-03-23 RX ORDER — CHLORHEXIDINE GLUCONATE 213 G/1000ML
1 SOLUTION TOPICAL DAILY
Refills: 0 | Status: DISCONTINUED | OUTPATIENT
Start: 2023-03-23 | End: 2023-03-29

## 2023-03-23 RX ORDER — HEPARIN SODIUM 5000 [USP'U]/ML
3000 INJECTION INTRAVENOUS; SUBCUTANEOUS EVERY 12 HOURS
Refills: 0 | Status: DISCONTINUED | OUTPATIENT
Start: 2023-03-24 | End: 2023-03-29

## 2023-03-23 RX ORDER — PROPOFOL 10 MG/ML
1 INJECTION, EMULSION INTRAVENOUS
Qty: 1000 | Refills: 0 | Status: DISCONTINUED | OUTPATIENT
Start: 2023-03-23 | End: 2023-03-24

## 2023-03-23 RX ORDER — FAMOTIDINE 10 MG/ML
20 INJECTION INTRAVENOUS EVERY 12 HOURS
Refills: 0 | Status: DISCONTINUED | OUTPATIENT
Start: 2023-03-23 | End: 2023-03-24

## 2023-03-23 RX ORDER — ASPIRIN/CALCIUM CARB/MAGNESIUM 324 MG
300 TABLET ORAL ONCE
Refills: 0 | Status: DISCONTINUED | OUTPATIENT
Start: 2023-03-23 | End: 2023-03-23

## 2023-03-23 RX ORDER — MEPERIDINE HYDROCHLORIDE 50 MG/ML
25 INJECTION INTRAMUSCULAR; INTRAVENOUS; SUBCUTANEOUS ONCE
Refills: 0 | Status: DISCONTINUED | OUTPATIENT
Start: 2023-03-23 | End: 2023-03-27

## 2023-03-23 RX ADMIN — Medication 1.5 MICROGRAM(S)/MIN: at 22:30

## 2023-03-23 RX ADMIN — INSULIN HUMAN 1 UNIT(S)/HR: 100 INJECTION, SOLUTION SUBCUTANEOUS at 20:00

## 2023-03-23 RX ADMIN — SODIUM CHLORIDE 10 MILLILITER(S): 9 INJECTION INTRAMUSCULAR; INTRAVENOUS; SUBCUTANEOUS at 20:00

## 2023-03-23 RX ADMIN — MUPIROCIN 1 APPLICATION(S): 20 OINTMENT TOPICAL at 05:32

## 2023-03-23 RX ADMIN — CHLORHEXIDINE GLUCONATE 1 APPLICATION(S): 213 SOLUTION TOPICAL at 05:32

## 2023-03-23 RX ADMIN — FENTANYL CITRATE 25 MICROGRAM(S): 50 INJECTION INTRAVENOUS at 22:00

## 2023-03-23 RX ADMIN — CHLORHEXIDINE GLUCONATE 5 MILLILITER(S): 213 SOLUTION TOPICAL at 17:29

## 2023-03-23 RX ADMIN — Medication 250 MILLIGRAM(S): at 23:32

## 2023-03-23 RX ADMIN — Medication 100 MILLIEQUIVALENT(S): at 17:27

## 2023-03-23 RX ADMIN — PANTOPRAZOLE SODIUM 40 MILLIGRAM(S): 20 TABLET, DELAYED RELEASE ORAL at 05:31

## 2023-03-23 RX ADMIN — Medication 9.19 MICROGRAM(S)/KG/MIN: at 20:00

## 2023-03-23 RX ADMIN — FENTANYL CITRATE 25 MICROGRAM(S): 50 INJECTION INTRAVENOUS at 21:45

## 2023-03-23 RX ADMIN — AMIODARONE HYDROCHLORIDE 33.3 MG/MIN: 400 TABLET ORAL at 20:00

## 2023-03-23 RX ADMIN — Medication 250 MILLILITER(S): at 17:28

## 2023-03-23 RX ADMIN — CHLORHEXIDINE GLUCONATE 15 MILLILITER(S): 213 SOLUTION TOPICAL at 06:20

## 2023-03-23 RX ADMIN — FAMOTIDINE 20 MILLIGRAM(S): 10 INJECTION INTRAVENOUS at 17:29

## 2023-03-23 RX ADMIN — Medication 1000 MILLIGRAM(S): at 18:54

## 2023-03-23 RX ADMIN — ALBUTEROL 2 PUFF(S): 90 AEROSOL, METERED ORAL at 02:32

## 2023-03-23 RX ADMIN — Medication 400 MILLIGRAM(S): at 18:24

## 2023-03-23 RX ADMIN — Medication 100 MILLIGRAM(S): at 22:04

## 2023-03-23 NOTE — DISCHARGE NOTE PROVIDER - HOSPITAL COURSE
63 y/o female with PMHx of HTN, HLD, COPD not on home O2, pre-DM, active smoker presented to the ED for chest tightness since 9am yesterday. She reports after breakfast she sat on the couch watching tv, when she suddenly developed substernal chest tightness with SOB and flushing. She got up to get her inhalers when she became dizzy prompting her ED visit yesterday. She described the chest tightness as non-radiating, substernal, improves on ambulation, worse on rest. She denies any similar episodes in the past. Denies any fevers, chills, abdominal pain, nausea, vomiting, diarrhea, constipation, LE swelling. Never had stress test before, no cardiac cath done. FH+ father  from MI at age 60. Today, patient underwent cardiac cath via right wrist which revealed left main disease, calcified LAD (80%), CX (90%), & RCA (60%)m and severe RPDA disease. CT surgery was consulted for possible myocardial revascularization via CABG. On 3/23/23 patient underwent.... 61 y/o female with PMHx of HTN, HLD, COPD not on home O2, pre-DM, active smoker  9greater than 75 pack years) presented to the ED for chest tightness since 9am yesterday. She reports after breakfast she sat on the couch watching tv, when she suddenly developed substernal chest tightness with SOB and flushing. She got up to get her inhalers when she became dizzy prompting her ED visit yesterday. She described the chest tightness as non-radiating, substernal, improves on ambulation, worse on rest. She denies any similar episodes in the past. Denies any fevers, chills, abdominal pain, nausea, vomiting, diarrhea, constipation, LE swelling. Never had stress test before, no cardiac cath done. FH+ father  from MI at age 60. Today, patient underwent cardiac cath via right wrist which revealed left main disease, calcified LAD (80%), CX (90%), & RCA (60%)m and severe RPDA disease. CT surgery was consulted for possible myocardial revascularization via CABG. On 3/23/23 patient underwent.... 63 y/o female with PMHx of HTN, HLD, COPD not on home O2, pre-DM, active smoker (greater than 75 pack years) presented to the ED for chest tightness since 9am yesterday. She reports after breakfast she sat on the couch watching tv, when she suddenly developed substernal chest tightness with SOB and flushing. She got up to get her inhalers when she became dizzy prompting her ED visit yesterday. She described the chest tightness as non-radiating, substernal, improves on ambulation, worse on rest. She denies any similar episodes in the past. Denies any fevers, chills, abdominal pain, nausea, vomiting, diarrhea, constipation, LE swelling. Never had stress test before, no cardiac cath done. FH+ father  from MI at age 60. Today, patient underwent cardiac cath via right wrist which revealed left main disease, calcified LAD (80%), CX (90%), & RCA (60%)m and severe RPDA disease. CT surgery was consulted for possible myocardial revascularization via CABG. On 3/23/23 patient underwent a CABG x 4 and was treated with ABX for a UTI.  She otherwise had an uneventful hospital course and was discharged home in stable condition on POD 6.

## 2023-03-23 NOTE — DISCHARGE NOTE PROVIDER - NSDCCPTREATMENT_GEN_ALL_CORE_FT
PRINCIPAL PROCEDURE  Procedure: CABG, with JUAN  Findings and Treatment: LIMA to LAD, RSVG to Diag, OM and pDA

## 2023-03-23 NOTE — DISCHARGE NOTE PROVIDER - CARE PROVIDER_API CALL
Ja Chris)  Surgery; Thoracic and Cardiac Surgery  [address] [place] [address]  Phone: [phone]  Fax: [phone]  Follow Up Time:    Ja Chris)  Surgery; Thoracic and Cardiac Surgery  [address] [place] [address]  Phone: [phone]  Fax: [phone]  Follow Up Time:     Erlin Santa)  Cardiovascular Disease; Internal Medicine  [address] [place] [address]  Phone: [phone]  Fax: [phone]  Follow Up Time:

## 2023-03-23 NOTE — PROGRESS NOTE ADULT - SUBJECTIVE AND OBJECTIVE BOX
JENNIFER TORRES 62y Female  MRN#: [id number]   CODE STATUS:________    Hospital Day: 5d  Patient with + ua preop now on rocephin planned for CTS today                                            OBJECTIVE  PAST MEDICAL & SURGICAL HISTORY  Hypertension    High cholesterol                                                ALLERGIES:  No Known Allergies                           HOME MEDICATIONS  Home Medications:  Albuterol (Eqv-Ventolin HFA) 90 mcg/inh inhalation aerosol: 2 puff(s) inhaled every 6 hours (18 Mar 2023 15:42)  amLODIPine 10 mg oral tablet: 1 tab(s) orally once a day (18 Mar 2023 15:42)  Aspir 81 oral delayed release tablet: 1 tab(s) orally once a day (18 Mar 2023 15:42)  atorvastatin 40 mg oral tablet: 1 tab(s) orally once a day (18 Mar 2023 15:42)  carvedilol 6.25 mg oral tablet: 1 tab(s) orally 2 times a day (18 Mar 2023 15:41)  Combivent Respimat 20 mcg-100 mcg/inh inhalation aerosol: 1 puff(s) inhaled 4 times a day (18 Mar 2023 15:41)                           MEDICATIONS:  STANDING MEDICATIONS    PRN MEDICATIONS                                            ------------------------------------------------------------  VITAL SIGNS: Last 24 Hours  T(C): 36.2 (23 Mar 2023 06:27), Max: 36.2 (22 Mar 2023 13:58)  T(F): 97.2 (23 Mar 2023 06:27), Max: 97.2 (22 Mar 2023 13:58)  HR: 64 (23 Mar 2023 06:27) (64 - 73)  BP: 116/64 (23 Mar 2023 06:27) (115/63 - 139/71)  BP(mean): 82 (22 Mar 2023 15:17) (82 - 82)  RR: 18 (23 Mar 2023 06:27) (18 - 18)  SpO2: 100% (23 Mar 2023 06:27) (98% - 100%)      23 @ 07:01  -  23 @ 07:00  --------------------------------------------------------  IN: 486 mL / OUT: 0 mL / NET: 486 mL                                               LABS:                        11.7   4.52  )-----------( 166      ( 23 Mar 2023 05:56 )             35.8         140  |  104  |  11  ----------------------------<  114<H>  4.3   |  25  |  0.7    Ca    9.7      22 Mar 2023 06:30  Phos  3.5       Mg     1.9         TPro  7.3  /  Alb  4.4  /  TBili  0.4  /  DBili  x   /  AST  15  /  ALT  12  /  AlkPhos  107        Urinalysis Basic - ( 22 Mar 2023 13:38 )    Color: Light Orange / Appearance: Turbid / S.018 / pH: x  Gluc: x / Ketone: Trace  / Bili: Negative / Urobili: <2 mg/dL   Blood: x / Protein: 300 mg/dL / Nitrite: Positive   Leuk Esterase: Large / RBC: >720 /HPF /  /HPF   Sq Epi: x / Non Sq Epi: 1 /HPF / Bacteria: Many                                                            RADIOLOGY:        PHYSICAL EXAM:  GENERAL: NAD, lying in bed comfortably  HEAD:  Atraumatic, Normocephalic  EYES: EOMI, PERRLA, conjunctiva and sclera clear  ENT: Moist mucous membranes  NECK: Supple, No JVD  CHEST/LUNG: Clear to auscultation bilaterally; No rales, rhonchi, wheezing, or rubs. Unlabored respirations  HEART: Regular rate and rhythm; No murmurs, rubs, or gallops  ABDOMEN: BSx4; Soft, nontender, nondistended  EXTREMITIES:  2+ Peripheral Pulses, brisk capillary refill. No clubbing, cyanosis, or edema  NERVOUS SYSTEM:  A&Ox3, no focal deficits   SKIN: No rashes or lesions                                         ASSESSMENT & PLAN  Past medical history and hospital course       #asymptomatic bacteruria  -found on preop labs   -cts started rocephin      # Chest pain, ACS ruled out  - CCTA with high calcium socre 1504, high likelihood of CAD  - c/w telemetry  - NM stress test done, pending result, Cardiology evaluation if positive stress test  -s/p cath  tripple vs dz now for cts today     # COPD not on home O2, stable   - c/w home inhalers    # Trace Pericardial Effusion incidentally noted on CCTA  - will obtain TTE to further assess    # Prominent Mediastinal LN, possibly reactive   - afebrile, no leukocytosis    # HTN  # HLD  - continue with amlodipine and atorvastatin 40mg   - will resume home coreg    # Pre-DM  - currently not on medications  - monitor FS, if FS>180 start basal/bolus    # Active smoker  - discussed smoking cessation

## 2023-03-23 NOTE — DISCHARGE NOTE PROVIDER - NSDCMRMEDTOKEN_GEN_ALL_CORE_FT
Albuterol (Eqv-Ventolin HFA) 90 mcg/inh inhalation aerosol: 2 puff(s) inhaled every 6 hours  amLODIPine 10 mg oral tablet: 1 tab(s) orally once a day  Aspir 81 oral delayed release tablet: 1 tab(s) orally once a day  atorvastatin 40 mg oral tablet: 1 tab(s) orally once a day  carvedilol 6.25 mg oral tablet: 1 tab(s) orally 2 times a day  Combivent Respimat 20 mcg-100 mcg/inh inhalation aerosol: 1 puff(s) inhaled 4 times a day   aspirin 325 mg oral delayed release tablet: 1 tab(s) orally once a day  atorvastatin 40 mg oral tablet: 1 tab(s) orally  Combivent Respimat 20 mcg-100 mcg/inh inhalation aerosol: 1 puff(s) inhaled 4 times a day  famotidine 20 mg oral tablet: 1 tab(s) orally 2 times a day  ferrous sulfate 325 mg (65 mg elemental iron) oral tablet: 1 tab(s) orally once a day  folic acid 1 mg oral tablet: 1 tab(s) orally once a day  Lasix 40 mg oral tablet: 1 tab(s) orally once a day  metoprolol tartrate 25 mg oral tablet: 1 tab(s) orally every 12 hours  Multiple Vitamins with Minerals oral tablet: 1 tab(s) orally once a day  oxycodone-acetaminophen 5 mg-325 mg oral tablet: 1 tab(s) orally every 6 hours as needed for  moderate pain MDD: 6  potassium chloride 20 mEq oral tablet, extended release: 1 tab(s) orally once a day   aspirin 325 mg oral delayed release tablet: 1 tab(s) orally once a day  atorvastatin 40 mg oral tablet: 1 tab(s) orally  Combivent Respimat 20 mcg-100 mcg/inh inhalation aerosol: 1 puff(s) inhaled 4 times a day  famotidine 20 mg oral tablet: 1 tab(s) orally 2 times a day  ferrous sulfate 325 mg (65 mg elemental iron) oral tablet: 1 tab(s) orally once a day  folic acid 1 mg oral tablet: 1 tab(s) orally once a day  Lasix 40 mg oral tablet: 1 tab(s) orally once a day  metoprolol tartrate 25 mg oral tablet: 1 tab(s) orally every 12 hours  Multiple Vitamins with Minerals oral tablet: 1 tab(s) orally once a day  oxycodone-acetaminophen 5 mg-325 mg oral tablet: 1 tab(s) orally every 6 hours as needed for  moderate pain MDD: 6  oxycodone-acetaminophen 5 mg-325 mg oral tablet: 1 tab(s) orally every 6 hours as needed for  moderate pain MDD: 6  oxycodone-acetaminophen 5 mg-325 mg oral tablet: 1 tab(s) orally every 6 hours as needed for  moderate pain MDD: 6  potassium chloride 20 mEq oral tablet, extended release: 1 tab(s) orally once a day

## 2023-03-23 NOTE — DISCHARGE NOTE PROVIDER - NSDCACTIVITY_GEN_ALL_CORE
Showering allowed/Stairs allowed/Walking - Indoors allowed/No heavy lifting/straining/Walking - Outdoors allowed/Follow Instructions Provided by your Surgical Team Do not drive or operate machinery/Showering allowed/Do not make important decisions/Stairs allowed/Walking - Indoors allowed/No heavy lifting/straining/Walking - Outdoors allowed/Follow Instructions Provided by your Surgical Team

## 2023-03-23 NOTE — DISCHARGE NOTE PROVIDER - PROVIDER TOKENS
PROVIDER:[TOKEN:[[id number]]] PROVIDER:[TOKEN:[[id number]]],PROVIDER:[TOKEN:[[id number]]]

## 2023-03-23 NOTE — DISCHARGE NOTE PROVIDER - NSDCFUADDAPPT_GEN_ALL_CORE_FT
please follow up with dr walker on Monday 4/03 at 1pm    please call cardiology for follow up appointment in 2 weeks and please call pmd for appointment in 4 weeks from discharge please follow up with dr walker on Monday 4/03 at 1pm    please call cardiology for follow up appointment in 2 weeks and please call pmd for appointment in 4 weeks from discharge    Pt was educated on the importance of attending cardiac rehab post op and was given materials re cardiac rehab program.  She and her daughter were instructed to inquire further upon seeing her cardiologist.

## 2023-03-23 NOTE — BRIEF OPERATIVE NOTE - COMMENTS
Og Kwon participated as the first assistant during the distal and proximal anastomosis in the absence of a qualified MD or fellow.

## 2023-03-24 LAB
ALBUMIN SERPL ELPH-MCNC: 3.6 G/DL — SIGNIFICANT CHANGE UP (ref 3.5–5.2)
ALP SERPL-CCNC: 67 U/L — SIGNIFICANT CHANGE UP (ref 30–115)
ALT FLD-CCNC: 13 U/L — SIGNIFICANT CHANGE UP (ref 0–41)
ANION GAP SERPL CALC-SCNC: 10 MMOL/L — SIGNIFICANT CHANGE UP (ref 7–14)
ANION GAP SERPL CALC-SCNC: 9 MMOL/L — SIGNIFICANT CHANGE UP (ref 7–14)
ANION GAP SERPL CALC-SCNC: 9 MMOL/L — SIGNIFICANT CHANGE UP (ref 7–14)
AST SERPL-CCNC: 30 U/L — SIGNIFICANT CHANGE UP (ref 0–41)
BASE EXCESS BLDMV CALC-SCNC: -1.7 MMOL/L — SIGNIFICANT CHANGE UP
BASOPHILS # BLD AUTO: 0.01 K/UL — SIGNIFICANT CHANGE UP (ref 0–0.2)
BASOPHILS NFR BLD AUTO: 0.1 % — SIGNIFICANT CHANGE UP (ref 0–1)
BILIRUB SERPL-MCNC: 0.5 MG/DL — SIGNIFICANT CHANGE UP (ref 0.2–1.2)
BUN SERPL-MCNC: 7 MG/DL — LOW (ref 10–20)
BUN SERPL-MCNC: 8 MG/DL — LOW (ref 10–20)
BUN SERPL-MCNC: 9 MG/DL — LOW (ref 10–20)
CALCIUM SERPL-MCNC: 8.3 MG/DL — LOW (ref 8.4–10.5)
CALCIUM SERPL-MCNC: 8.6 MG/DL — SIGNIFICANT CHANGE UP (ref 8.4–10.4)
CALCIUM SERPL-MCNC: 8.6 MG/DL — SIGNIFICANT CHANGE UP (ref 8.4–10.4)
CHLORIDE SERPL-SCNC: 105 MMOL/L — SIGNIFICANT CHANGE UP (ref 98–110)
CHLORIDE SERPL-SCNC: 107 MMOL/L — SIGNIFICANT CHANGE UP (ref 98–110)
CHLORIDE SERPL-SCNC: 109 MMOL/L — SIGNIFICANT CHANGE UP (ref 98–110)
CO2 SERPL-SCNC: 21 MMOL/L — SIGNIFICANT CHANGE UP (ref 17–32)
CO2 SERPL-SCNC: 22 MMOL/L — SIGNIFICANT CHANGE UP (ref 17–32)
CO2 SERPL-SCNC: 22 MMOL/L — SIGNIFICANT CHANGE UP (ref 17–32)
CREAT SERPL-MCNC: 0.6 MG/DL — LOW (ref 0.7–1.5)
CREAT SERPL-MCNC: 0.6 MG/DL — LOW (ref 0.7–1.5)
CREAT SERPL-MCNC: 0.7 MG/DL — SIGNIFICANT CHANGE UP (ref 0.7–1.5)
EGFR: 100 ML/MIN/1.73M2 — SIGNIFICANT CHANGE UP
EGFR: 100 ML/MIN/1.73M2 — SIGNIFICANT CHANGE UP
EGFR: 97 ML/MIN/1.73M2 — SIGNIFICANT CHANGE UP
EOSINOPHIL # BLD AUTO: 0.01 K/UL — SIGNIFICANT CHANGE UP (ref 0–0.7)
EOSINOPHIL NFR BLD AUTO: 0.1 % — SIGNIFICANT CHANGE UP (ref 0–8)
GAS PNL BLDA: SIGNIFICANT CHANGE UP
GAS PNL BLDMV: SIGNIFICANT CHANGE UP
GLUCOSE BLDC GLUCOMTR-MCNC: 106 MG/DL — HIGH (ref 70–99)
GLUCOSE BLDC GLUCOMTR-MCNC: 110 MG/DL — HIGH (ref 70–99)
GLUCOSE BLDC GLUCOMTR-MCNC: 133 MG/DL — HIGH (ref 70–99)
GLUCOSE BLDC GLUCOMTR-MCNC: 133 MG/DL — HIGH (ref 70–99)
GLUCOSE BLDC GLUCOMTR-MCNC: 136 MG/DL — HIGH (ref 70–99)
GLUCOSE BLDC GLUCOMTR-MCNC: 136 MG/DL — HIGH (ref 70–99)
GLUCOSE BLDC GLUCOMTR-MCNC: 144 MG/DL — HIGH (ref 70–99)
GLUCOSE BLDC GLUCOMTR-MCNC: 174 MG/DL — HIGH (ref 70–99)
GLUCOSE SERPL-MCNC: 100 MG/DL — HIGH (ref 70–99)
GLUCOSE SERPL-MCNC: 127 MG/DL — HIGH (ref 70–99)
GLUCOSE SERPL-MCNC: 129 MG/DL — HIGH (ref 70–99)
HCO3 BLDMV-SCNC: 24 MMOL/L — SIGNIFICANT CHANGE UP
HCT VFR BLD CALC: 26.8 % — LOW (ref 37–47)
HCT VFR BLD CALC: 28.6 % — LOW (ref 37–47)
HCT VFR BLD CALC: 29.1 % — LOW (ref 37–47)
HGB BLD-MCNC: 8.9 G/DL — LOW (ref 12–16)
HGB BLD-MCNC: 9.3 G/DL — LOW (ref 12–16)
HGB BLD-MCNC: 9.6 G/DL — LOW (ref 12–16)
IMM GRANULOCYTES NFR BLD AUTO: 0.6 % — HIGH (ref 0.1–0.3)
LYMPHOCYTES # BLD AUTO: 0.95 K/UL — LOW (ref 1.2–3.4)
LYMPHOCYTES # BLD AUTO: 13.3 % — LOW (ref 20.5–51.1)
MAGNESIUM SERPL-MCNC: 2 MG/DL — SIGNIFICANT CHANGE UP (ref 1.8–2.4)
MAGNESIUM SERPL-MCNC: 2.2 MG/DL — SIGNIFICANT CHANGE UP (ref 1.8–2.4)
MCHC RBC-ENTMCNC: 30.3 PG — SIGNIFICANT CHANGE UP (ref 27–31)
MCHC RBC-ENTMCNC: 30.3 PG — SIGNIFICANT CHANGE UP (ref 27–31)
MCHC RBC-ENTMCNC: 30.9 PG — SIGNIFICANT CHANGE UP (ref 27–31)
MCHC RBC-ENTMCNC: 32.5 G/DL — SIGNIFICANT CHANGE UP (ref 32–37)
MCHC RBC-ENTMCNC: 33 G/DL — SIGNIFICANT CHANGE UP (ref 32–37)
MCHC RBC-ENTMCNC: 33.2 G/DL — SIGNIFICANT CHANGE UP (ref 32–37)
MCV RBC AUTO: 91.8 FL — SIGNIFICANT CHANGE UP (ref 81–99)
MCV RBC AUTO: 93.1 FL — SIGNIFICANT CHANGE UP (ref 81–99)
MCV RBC AUTO: 93.2 FL — SIGNIFICANT CHANGE UP (ref 81–99)
MONOCYTES # BLD AUTO: 0.57 K/UL — SIGNIFICANT CHANGE UP (ref 0.1–0.6)
MONOCYTES NFR BLD AUTO: 8 % — SIGNIFICANT CHANGE UP (ref 1.7–9.3)
NEUTROPHILS # BLD AUTO: 5.57 K/UL — SIGNIFICANT CHANGE UP (ref 1.4–6.5)
NEUTROPHILS NFR BLD AUTO: 77.9 % — HIGH (ref 42.2–75.2)
NRBC # BLD: 0 /100 WBCS — SIGNIFICANT CHANGE UP (ref 0–0)
O2 CT VFR BLD CALC: 39 MMHG — SIGNIFICANT CHANGE UP
PCO2 BLDMV: 45 MMHG — SIGNIFICANT CHANGE UP
PH BLDMV: 7.34 — SIGNIFICANT CHANGE UP
PLATELET # BLD AUTO: 122 K/UL — LOW (ref 130–400)
PLATELET # BLD AUTO: 124 K/UL — LOW (ref 130–400)
PLATELET # BLD AUTO: 127 K/UL — LOW (ref 130–400)
POTASSIUM SERPL-MCNC: 3.9 MMOL/L — SIGNIFICANT CHANGE UP (ref 3.5–5)
POTASSIUM SERPL-MCNC: 4.4 MMOL/L — SIGNIFICANT CHANGE UP (ref 3.5–5)
POTASSIUM SERPL-MCNC: 4.4 MMOL/L — SIGNIFICANT CHANGE UP (ref 3.5–5)
POTASSIUM SERPL-SCNC: 3.9 MMOL/L — SIGNIFICANT CHANGE UP (ref 3.5–5)
POTASSIUM SERPL-SCNC: 4.4 MMOL/L — SIGNIFICANT CHANGE UP (ref 3.5–5)
POTASSIUM SERPL-SCNC: 4.4 MMOL/L — SIGNIFICANT CHANGE UP (ref 3.5–5)
PROT SERPL-MCNC: 6 G/DL — SIGNIFICANT CHANGE UP (ref 6–8)
RBC # BLD: 2.88 M/UL — LOW (ref 4.2–5.4)
RBC # BLD: 3.07 M/UL — LOW (ref 4.2–5.4)
RBC # BLD: 3.17 M/UL — LOW (ref 4.2–5.4)
RBC # FLD: 13.3 % — SIGNIFICANT CHANGE UP (ref 11.5–14.5)
RBC # FLD: 14 % — SIGNIFICANT CHANGE UP (ref 11.5–14.5)
RBC # FLD: 14.1 % — SIGNIFICANT CHANGE UP (ref 11.5–14.5)
SAO2 % BLDMV: 65.2 % — SIGNIFICANT CHANGE UP
SODIUM SERPL-SCNC: 136 MMOL/L — SIGNIFICANT CHANGE UP (ref 135–146)
SODIUM SERPL-SCNC: 138 MMOL/L — SIGNIFICANT CHANGE UP (ref 135–146)
SODIUM SERPL-SCNC: 140 MMOL/L — SIGNIFICANT CHANGE UP (ref 135–146)
WBC # BLD: 7.15 K/UL — SIGNIFICANT CHANGE UP (ref 4.8–10.8)
WBC # BLD: 7.87 K/UL — SIGNIFICANT CHANGE UP (ref 4.8–10.8)
WBC # BLD: 8.19 K/UL — SIGNIFICANT CHANGE UP (ref 4.8–10.8)
WBC # FLD AUTO: 7.15 K/UL — SIGNIFICANT CHANGE UP (ref 4.8–10.8)
WBC # FLD AUTO: 7.87 K/UL — SIGNIFICANT CHANGE UP (ref 4.8–10.8)
WBC # FLD AUTO: 8.19 K/UL — SIGNIFICANT CHANGE UP (ref 4.8–10.8)

## 2023-03-24 PROCEDURE — 71045 X-RAY EXAM CHEST 1 VIEW: CPT | Mod: 26

## 2023-03-24 PROCEDURE — 93010 ELECTROCARDIOGRAM REPORT: CPT

## 2023-03-24 PROCEDURE — 99232 SBSQ HOSP IP/OBS MODERATE 35: CPT

## 2023-03-24 RX ORDER — OXYCODONE HYDROCHLORIDE 5 MG/1
5 TABLET ORAL EVERY 4 HOURS
Refills: 0 | Status: DISCONTINUED | OUTPATIENT
Start: 2023-03-24 | End: 2023-03-29

## 2023-03-24 RX ORDER — FAMOTIDINE 10 MG/ML
20 INJECTION INTRAVENOUS
Refills: 0 | Status: DISCONTINUED | OUTPATIENT
Start: 2023-03-24 | End: 2023-03-29

## 2023-03-24 RX ORDER — OXYCODONE HYDROCHLORIDE 5 MG/1
10 TABLET ORAL EVERY 4 HOURS
Refills: 0 | Status: DISCONTINUED | OUTPATIENT
Start: 2023-03-24 | End: 2023-03-29

## 2023-03-24 RX ORDER — ATORVASTATIN CALCIUM 80 MG/1
40 TABLET, FILM COATED ORAL AT BEDTIME
Refills: 0 | Status: DISCONTINUED | OUTPATIENT
Start: 2023-03-24 | End: 2023-03-29

## 2023-03-24 RX ORDER — POTASSIUM CHLORIDE 20 MEQ
40 PACKET (EA) ORAL ONCE
Refills: 0 | Status: COMPLETED | OUTPATIENT
Start: 2023-03-24 | End: 2023-03-24

## 2023-03-24 RX ORDER — HYDROMORPHONE HYDROCHLORIDE 2 MG/ML
0.5 INJECTION INTRAMUSCULAR; INTRAVENOUS; SUBCUTANEOUS ONCE
Refills: 0 | Status: DISCONTINUED | OUTPATIENT
Start: 2023-03-24 | End: 2023-03-24

## 2023-03-24 RX ORDER — POTASSIUM CHLORIDE 20 MEQ
20 PACKET (EA) ORAL ONCE
Refills: 0 | Status: COMPLETED | OUTPATIENT
Start: 2023-03-24 | End: 2023-03-24

## 2023-03-24 RX ORDER — ACETAMINOPHEN 500 MG
1000 TABLET ORAL ONCE
Refills: 0 | Status: COMPLETED | OUTPATIENT
Start: 2023-03-24 | End: 2023-03-24

## 2023-03-24 RX ADMIN — OXYCODONE HYDROCHLORIDE 10 MILLIGRAM(S): 5 TABLET ORAL at 11:34

## 2023-03-24 RX ADMIN — SENNA PLUS 2 TABLET(S): 8.6 TABLET ORAL at 21:37

## 2023-03-24 RX ADMIN — Medication 1000 MILLIGRAM(S): at 05:45

## 2023-03-24 RX ADMIN — OXYCODONE HYDROCHLORIDE 5 MILLIGRAM(S): 5 TABLET ORAL at 16:35

## 2023-03-24 RX ADMIN — Medication 9.57 MICROGRAM(S)/KG/MIN: at 01:53

## 2023-03-24 RX ADMIN — Medication 100 MILLIGRAM(S): at 21:36

## 2023-03-24 RX ADMIN — Medication 1000 MILLIGRAM(S): at 18:10

## 2023-03-24 RX ADMIN — Medication 400 MILLIGRAM(S): at 11:25

## 2023-03-24 RX ADMIN — Medication 1000 MILLIGRAM(S): at 00:45

## 2023-03-24 RX ADMIN — FENTANYL CITRATE 25 MICROGRAM(S): 50 INJECTION INTRAVENOUS at 01:35

## 2023-03-24 RX ADMIN — OXYCODONE HYDROCHLORIDE 10 MILLIGRAM(S): 5 TABLET ORAL at 21:30

## 2023-03-24 RX ADMIN — FENTANYL CITRATE 25 MICROGRAM(S): 50 INJECTION INTRAVENOUS at 03:30

## 2023-03-24 RX ADMIN — FENTANYL CITRATE 25 MICROGRAM(S): 50 INJECTION INTRAVENOUS at 03:45

## 2023-03-24 RX ADMIN — Medication 100 MILLIGRAM(S): at 14:19

## 2023-03-24 RX ADMIN — Medication 400 MILLIGRAM(S): at 17:44

## 2023-03-24 RX ADMIN — Medication 100 MILLIEQUIVALENT(S): at 18:47

## 2023-03-24 RX ADMIN — OXYCODONE HYDROCHLORIDE 10 MILLIGRAM(S): 5 TABLET ORAL at 20:51

## 2023-03-24 RX ADMIN — Medication 40 MILLIEQUIVALENT(S): at 18:47

## 2023-03-24 RX ADMIN — POLYETHYLENE GLYCOL 3350 17 GRAM(S): 17 POWDER, FOR SOLUTION ORAL at 12:18

## 2023-03-24 RX ADMIN — CHLORHEXIDINE GLUCONATE 1 APPLICATION(S): 213 SOLUTION TOPICAL at 20:51

## 2023-03-24 RX ADMIN — FENTANYL CITRATE 25 MICROGRAM(S): 50 INJECTION INTRAVENOUS at 01:50

## 2023-03-24 RX ADMIN — Medication 400 MILLIGRAM(S): at 00:15

## 2023-03-24 RX ADMIN — Medication 325 MILLIGRAM(S): at 12:18

## 2023-03-24 RX ADMIN — Medication 250 MILLIGRAM(S): at 14:19

## 2023-03-24 RX ADMIN — Medication 325 MILLIGRAM(S): at 00:26

## 2023-03-24 RX ADMIN — FAMOTIDINE 20 MILLIGRAM(S): 10 INJECTION INTRAVENOUS at 18:03

## 2023-03-24 RX ADMIN — FAMOTIDINE 20 MILLIGRAM(S): 10 INJECTION INTRAVENOUS at 05:08

## 2023-03-24 RX ADMIN — OXYCODONE HYDROCHLORIDE 5 MILLIGRAM(S): 5 TABLET ORAL at 15:56

## 2023-03-24 RX ADMIN — OXYCODONE HYDROCHLORIDE 10 MILLIGRAM(S): 5 TABLET ORAL at 11:00

## 2023-03-24 RX ADMIN — HYDROMORPHONE HYDROCHLORIDE 0.5 MILLIGRAM(S): 2 INJECTION INTRAMUSCULAR; INTRAVENOUS; SUBCUTANEOUS at 08:15

## 2023-03-24 RX ADMIN — Medication 100 MILLIGRAM(S): at 05:08

## 2023-03-24 RX ADMIN — Medication 1000 MILLIGRAM(S): at 12:00

## 2023-03-24 RX ADMIN — ATORVASTATIN CALCIUM 40 MILLIGRAM(S): 80 TABLET, FILM COATED ORAL at 21:37

## 2023-03-24 RX ADMIN — Medication 400 MILLIGRAM(S): at 05:05

## 2023-03-24 RX ADMIN — HEPARIN SODIUM 3000 UNIT(S): 5000 INJECTION INTRAVENOUS; SUBCUTANEOUS at 17:47

## 2023-03-24 NOTE — PHYSICAL THERAPY INITIAL EVALUATION ADULT - GENERAL OBSERVATIONS, REHAB EVAL
Pt remains lined with +swan solo, with fem line recently removed; as per rounding team, pt to remain on bedrest at this time. Requesting PT to f/u later once cleared for OOB.

## 2023-03-24 NOTE — PHYSICAL THERAPY INITIAL EVALUATION ADULT - GENERAL OBSERVATIONS, REHAB EVAL
1355 - 9420 Pt rec semifowler in bed, placed in bed in chair mode, with +chest tubes x 2, +tele, +A line, +NC 3 L/m, +pacer, +malin, in NAD. Pt agreeable to b/s PT, MADDIE Salgado present t/o session. 1355 - 2836 Pt rec semifowler in bed, placed in bed in chair mode, with +chest tubes x 2, +tele, +A line, +NC 3 L/m, +pacer, +malin, in NAD. Pt agreeable to b/s PT, MADDIE Salgado present t/o session. Pt cleared for amb with PT as per round team, requested MICHAEL Tipton to update activity order and discontinue bedrest.

## 2023-03-24 NOTE — PROGRESS NOTE ADULT - SUBJECTIVE AND OBJECTIVE BOX
Over Night Events:  pod1 doing well. on dobutamine low dose      ROS:  See HPI    PHYSICAL EXAM    ICU Vital Signs Last 24 Hrs  T(C): 37 (24 Mar 2023 07:00), Max: 37.3 (24 Mar 2023 05:00)  T(F): 98.6 (24 Mar 2023 07:00), Max: 99.1 (24 Mar 2023 05:00)  HR: 88 (24 Mar 2023 09:00) (77 - 98)  BP: 98/58 (24 Mar 2023 07:20) (88/62 - 119/65)  BP(mean): 73 (24 Mar 2023 07:20) (72 - 85)  ABP: 113/49 (24 Mar 2023 09:00) (-8/-8 - 135/-8)  ABP(mean): 68 (24 Mar 2023 09:00) (-8 - 84)  RR: 20 (24 Mar 2023 09:00) (9 - 37)  SpO2: 99% (24 Mar 2023 09:00) (92% - 100%)    O2 Parameters below as of 24 Mar 2023 07:15  Patient On (Oxygen Delivery Method): nasal cannula w/ humidification  O2 Flow (L/min): 4          General: nard  HEENT: BEVERLY             Lymph Nodes: No cervical LN   Lungs: Bilateral BS, decreased at the bases  Cardiovascular: Regular   Abdomen: Soft, Positive BS  Extremities: No edema  Skin: Warm  Neurological: Non focal       23 @ 07:01  -  23 @ 07:00  --------------------------------------------------------  IN:    Albumin 5%  - 500 mL: 500 mL    Amiodarone: 399.8 mL    Dexmedetomidine: 32.9 mL    DOBUTamine: 103.7 mL    Insulin: 23 mL    IV PiggyBack: 750 mL    NiCARdipine: 90 mL    Nitroglycerin: 18 mL    Norepinephrine: 18 mL    sodium chloride 0.9%: 340 mL  Total IN: 2275.4 mL    OUT:    Chest Tube (mL): 123 mL    Chest Tube (mL): 186 mL    Indwelling Catheter - Urethral (mL): 2280 mL    Nasogastric/Oral tube (mL): 0 mL  Total OUT: 2589 mL    Total NET: -313.6 mL      23 @ 07:01  -  23 @ 10:20  --------------------------------------------------------  IN:    Amiodarone: 100 mL    DOBUTamine: 9.2 mL    Norepinephrine: 9 mL    Oral Fluid: 120 mL    sodium chloride 0.9%: 30 mL  Total IN: 268.2 mL    OUT:    Chest Tube (mL): 0 mL    Chest Tube (mL): 0 mL    Indwelling Catheter - Urethral (mL): 60 mL  Total OUT: 60 mL    Total NET: 208.2 mL          LABS:                          9.6    7.15  )-----------( 124      ( 24 Mar 2023 02:18 )             29.1                                                   140  |  109  |  7<L>  ----------------------------<  100<H>  4.4   |  22  |  0.6<L>    Ca    8.3<L>      24 Mar 2023 02:18  Phos  3.5       Mg     2.2         TPro  6.0  /  Alb  3.6  /  TBili  0.6  /  DBili  x   /  AST  40  /  ALT  16  /  AlkPhos  86  23      PT/INR - ( 23 Mar 2023 15:46 )   PT: 13.90 sec;   INR: 1.21 ratio         PTT - ( 23 Mar 2023 15:46 )  PTT:31.3 sec                                       Urinalysis Basic - ( 22 Mar 2023 13:38 )    Color: Light Orange / Appearance: Turbid / S.018 / pH: x  Gluc: x / Ketone: Trace  / Bili: Negative / Urobili: <2 mg/dL   Blood: x / Protein: 300 mg/dL / Nitrite: Positive   Leuk Esterase: Large / RBC: >720 /HPF /  /HPF   Sq Epi: x / Non Sq Epi: 1 /HPF / Bacteria: Many                                                  LIVER FUNCTIONS - ( 23 Mar 2023 15:46 )  Alb: 3.6 g/dL / Pro: 6.0 g/dL / ALK PHOS: 86 U/L / ALT: 16 U/L / AST: 40 U/L / GGT: x                                                  Culture - Tissue with Gram Stain (collected 23 Mar 2023 09:36)  Source: .Tissue LEFT INTERNAL MAMMARY LYMPH NODE  Gram Stain (23 Mar 2023 23:26):    No polymorphonuclear cells seen per low power field    No organisms seen per oil power field                                                   Mode: CPAP with PS  FiO2: 40  PEEP: 5                                      ABG - ( 24 Mar 2023 04:20 )  pH, Arterial: 7.38  pH, Blood: x     /  pCO2: 37    /  pO2: 90    / HCO3: 22    / Base Excess: -2.9  /  SaO2: 98.4                MEDICATIONS  (STANDING):  acetaminophen   IVPB .. 1000 milliGRAM(s) IV Intermittent once  acetaminophen   IVPB .. 1000 milliGRAM(s) IV Intermittent once  albuterol    90 MICROgram(s) HFA Inhaler 2 Puff(s) Inhalation every 6 hours  aMIOdarone Infusion 1 mG/Min (33.3 mL/Hr) IV Continuous <Continuous>  aspirin enteric coated 325 milliGRAM(s) Oral daily  atorvastatin 40 milliGRAM(s) Oral at bedtime  ceFAZolin   IVPB 1000 milliGRAM(s) IV Intermittent every 8 hours  chlorhexidine 2% Cloths 1 Application(s) Topical daily  dexMEDEtomidine Infusion 0.2 MICROgram(s)/kG/Hr (5.11 mL/Hr) IV Continuous <Continuous>  dextrose 5%. 1000 milliLiter(s) (50 mL/Hr) IV Continuous <Continuous>  dextrose 5%. 1000 milliLiter(s) (100 mL/Hr) IV Continuous <Continuous>  dextrose 50% Injectable 25 Gram(s) IV Push once  dextrose 50% Injectable 12.5 Gram(s) IV Push once  dextrose 50% Injectable 25 Gram(s) IV Push once  dextrose 50% Injectable 50 milliLiter(s) IV Push every 15 minutes  dextrose 50% Injectable 25 milliLiter(s) IV Push every 15 minutes  DOBUTamine Infusion 3 MICROgram(s)/kG/Min (9.19 mL/Hr) IV Continuous <Continuous>  famotidine Injectable 20 milliGRAM(s) IV Push every 12 hours  glucagon  Injectable 1 milliGRAM(s) IntraMuscular once  heparin   Injectable 3000 Unit(s) SubCutaneous every 12 hours  insulin regular Infusion 1 Unit(s)/Hr (1 mL/Hr) IV Continuous <Continuous>  ipratropium 17 MICROgram(s) HFA Inhaler 2 Puff(s) Inhalation every 6 hours  meperidine     Injectable 25 milliGRAM(s) IV Push once  niCARdipine Infusion 5 mG/Hr (25 mL/Hr) IV Continuous <Continuous>  nitroglycerin  Infusion 5 MICROgram(s)/Min (1.5 mL/Hr) IV Continuous <Continuous>  norepinephrine Infusion 0.05 MICROgram(s)/kG/Min (9.57 mL/Hr) IV Continuous <Continuous>  polyethylene glycol 3350 17 Gram(s) Oral daily  propofol Infusion 1 MICROgram(s)/kG/Min (0.61 mL/Hr) IV Continuous <Continuous>  senna 2 Tablet(s) Oral at bedtime  sodium chloride 0.9%. 1000 milliLiter(s) (10 mL/Hr) IV Continuous <Continuous>  vancomycin  IVPB 1000 milliGRAM(s) IV Intermittent every 12 hours  vasopressin Infusion 0.04 Unit(s)/Min (6 mL/Hr) IV Continuous <Continuous>    MEDICATIONS  (PRN):  dextrose Oral Gel 15 Gram(s) Oral once PRN Blood Glucose LESS THAN 70 milliGRAM(s)/deciliter      Xrays:             low lung volume                                                                        ECHO

## 2023-03-24 NOTE — PHYSICAL THERAPY INITIAL EVALUATION ADULT - GAIT DEVIATIONS NOTED, PT EVAL
Slow moving although gradually improved over course of amb, initially apprehensive to advance, decreased step length/height/decreased isabel/increased time in double stance/decreased step length/decreased stride length/decreased weight-shifting ability

## 2023-03-24 NOTE — PROGRESS NOTE ADULT - SUBJECTIVE AND OBJECTIVE BOX
OPERATIVE PROCEDURE(s):         CABGx4       POD #1                       62yFemale    SURGEON(s): NGOC Yeboah    SUBJECTIVE ASSESSMENT:  Patient has no complaints at this time.      Vital Signs Last 24 Hrs  T(F): 98.6 (24 Mar 2023 07:00), Max: 99.1 (24 Mar 2023 05:00)  HR: 88 (24 Mar 2023 09:00) (77 - 98)  BP: 98/58 (24 Mar 2023 07:20) (88/62 - 119/65)  BP(mean): 73 (24 Mar 2023 07:20) (72 - 85)  ABP: 113/49 (24 Mar 2023 09:00) (-8/-8 - 135/-8)  ABP(mean): 68 (24 Mar 2023 09:00)  RR: 20 (24 Mar 2023 09:00) (9 - 37)  SpO2: 99% (24 Mar 2023 09:00) (92% - 100%)  CVP(mm Hg): 28 (24 Mar 2023 08:15)  CO: 4.9 (24 Mar 2023 07:20)  CI: 2.3 (24 Mar 2023 07:20)  PA: 42/18 (24 Mar 2023 08:15)  SVR: 864 (24 Mar 2023 07:20)  Mode: CPAP with PS  FiO2: 40  PEEP: 5    I&O's Detail    23 Mar 2023 07:01  -  24 Mar 2023 07:00  --------------------------------------------------------  IN:    Albumin 5%  - 500 mL: 500 mL    Amiodarone: 399.8 mL    Dexmedetomidine: 32.9 mL    DOBUTamine: 103.7 mL    Insulin: 23 mL    IV PiggyBack: 750 mL    NiCARdipine: 90 mL    Nitroglycerin: 18 mL    Norepinephrine: 18 mL    sodium chloride 0.9%: 340 mL  Total IN: 2275.4 mL    OUT:    Chest Tube (mL): 123 mL    Chest Tube (mL): 186 mL    Indwelling Catheter - Urethral (mL): 2280 mL    Nasogastric/Oral tube (mL): 0 mL  Total OUT: 2589 mL        Net:   I&O's Detail    22 Mar 2023 07:01  -  23 Mar 2023 07:00  --------------------------------------------------------  Total NET: 486 mL      23 Mar 2023 07:01  -  24 Mar 2023 07:00  --------------------------------------------------------  Total NET: -313.6 mL        CAPILLARY BLOOD GLUCOSE      POCT Blood Glucose.: 144 mg/dL (24 Mar 2023 06:01)  POCT Blood Glucose.: 133 mg/dL (24 Mar 2023 04:20)  POCT Blood Glucose.: 106 mg/dL (24 Mar 2023 01:57)  POCT Blood Glucose.: 101 mg/dL (23 Mar 2023 23:57)  POCT Blood Glucose.: 108 mg/dL (23 Mar 2023 23:01)  POCT Blood Glucose.: 120 mg/dL (23 Mar 2023 22:00)  POCT Blood Glucose.: 132 mg/dL (23 Mar 2023 20:56)  POCT Blood Glucose.: 151 mg/dL (23 Mar 2023 20:03)  POCT Blood Glucose.: 155 mg/dL (23 Mar 2023 18:45)  POCT Blood Glucose.: 159 mg/dL (23 Mar 2023 17:55)  POCT Blood Glucose.: 151 mg/dL (23 Mar 2023 16:59)  POCT Blood Glucose.: 144 mg/dL (23 Mar 2023 15:32)    Physical Exam:  General: NAD; A&Ox3  Cardiac: S1/S2, RRR, no murmur, no rubs  Lungs: CTA b/l, no wheeze, no rales, no crackles  Abdomen: Soft/NT/ND; positive bowel sounds   Sternum: Intact, no click, incision healing well with no drainage  Incisions: Incisions clean/dry/intact  Extremities: +DP's    Central Venous Catheter: Yes[x]  Critical illness, venous access          Day #1  Malin Catheter: Yes  [x] Strict I/O's                                                            Day #1  EPICARDIAL WIRES:  [x] YES [] NO                                                              Day #1  BOWEL MOVEMENT:  [] YES [x] NO, If No, Timing since last BM:        Day #  CHEST TUBE(Left/Right):  [x] YES [] NO, If yes -  AIR LEAKS:  [] YES [x] NO        LABS:                        9.6<L>  7.15  )-----------( 124<L>    ( 24 Mar 2023 02:18 )             29.1<L>                        11.7<L>  10.01 )-----------( 115<L>    ( 23 Mar 2023 15:46 )             34.6<L>    03-24    140  |  109  |  7<L>  ----------------------------<  100<H>  4.4   |  22  |  0.6<L>  03-23    143  |  112<H>  |  8<L>  ----------------------------<  145<H>  4.4   |  22  |  0.6<L>    Ca    8.3<L>      24 Mar 2023 02:18  Phos  3.5     03-23  Mg     2.2     03-24    TPro  6.0 [6.0 - 8.0]  /  Alb  3.6 [3.5 - 5.2]  /  TBili  0.6 [0.2 - 1.2]  /  DBili  x   /  AST  40 [0 - 41]  /  ALT  16 [0 - 41]  /  AlkPhos  86 [30 - 115]  03-23    PT/INR - ( 23 Mar 2023 15:46 )   PT: ;   INR: 1.21 ratio         PTT - ( 23 Mar 2023 15:46 )  PTT:31.3 sec  Urinalysis Basic - ( 22 Mar 2023 13:38 )    Color: Light Orange / Appearance: Turbid / S.018 / pH: x  Gluc: x / Ketone: Trace  / Bili: Negative / Urobili: <2 mg/dL   Blood: x / Protein: 300 mg/dL / Nitrite: Positive   Leuk Esterase: Large / RBC: >720 /HPF /  /HPF   Sq Epi: x / Non Sq Epi: 1 /HPF / Bacteria: Many      ABG - ( 24 Mar 2023 04:20 )  pH: 7.38  /  pCO2: 37    /  pO2: 90    / HCO3: 22    / Base Excess: -2.9  /  SaO2: 98.4  /  LA: 0.80            EKG:  Ventricular Rate 89 BPM    Atrial Rate 89 BPM    P-R Interval 170 ms    QRS Duration 96 ms    Q-T Interval 402 ms    QTC Calculation(Bazett) 489 ms    P Axis 53 degrees    R Axis 21 degrees    T Axis 132 degrees    Diagnosis Line Normal sinus rhythm  T wave abnormality, consider anterolateral ischemia  Prolonged QT  Abnormal ECG    Confirmed by Cisco Cortes ([id number]) on 3/24/2023 8:59:35 AM (23 @ 07:19)    MEDICATIONS  (STANDING):  acetaminophen   IVPB .. 1000 milliGRAM(s) IV Intermittent once  acetaminophen   IVPB .. 1000 milliGRAM(s) IV Intermittent once  albuterol    90 MICROgram(s) HFA Inhaler 2 Puff(s) Inhalation every 6 hours  aMIOdarone Infusion 1 mG/Min (33.3 mL/Hr) IV Continuous <Continuous>  aspirin enteric coated 325 milliGRAM(s) Oral daily  ceFAZolin   IVPB 1000 milliGRAM(s) IV Intermittent every 8 hours  chlorhexidine 2% Cloths 1 Application(s) Topical daily  dexMEDEtomidine Infusion 0.2 MICROgram(s)/kG/Hr (5.11 mL/Hr) IV Continuous <Continuous>  dextrose 5%. 1000 milliLiter(s) (50 mL/Hr) IV Continuous <Continuous>  dextrose 5%. 1000 milliLiter(s) (100 mL/Hr) IV Continuous <Continuous>  dextrose 50% Injectable 25 Gram(s) IV Push once  dextrose 50% Injectable 12.5 Gram(s) IV Push once  dextrose 50% Injectable 25 Gram(s) IV Push once  dextrose 50% Injectable 50 milliLiter(s) IV Push every 15 minutes  dextrose 50% Injectable 25 milliLiter(s) IV Push every 15 minutes  DOBUTamine Infusion 3 MICROgram(s)/kG/Min (9.19 mL/Hr) IV Continuous <Continuous>  famotidine Injectable 20 milliGRAM(s) IV Push every 12 hours  glucagon  Injectable 1 milliGRAM(s) IntraMuscular once  heparin   Injectable 3000 Unit(s) SubCutaneous every 12 hours  insulin regular Infusion 1 Unit(s)/Hr (1 mL/Hr) IV Continuous <Continuous>  ipratropium 17 MICROgram(s) HFA Inhaler 2 Puff(s) Inhalation every 6 hours  meperidine     Injectable 25 milliGRAM(s) IV Push once  niCARdipine Infusion 5 mG/Hr (25 mL/Hr) IV Continuous <Continuous>  nitroglycerin  Infusion 5 MICROgram(s)/Min (1.5 mL/Hr) IV Continuous <Continuous>  norepinephrine Infusion 0.05 MICROgram(s)/kG/Min (9.57 mL/Hr) IV Continuous <Continuous>  polyethylene glycol 3350 17 Gram(s) Oral daily  propofol Infusion 1 MICROgram(s)/kG/Min (0.61 mL/Hr) IV Continuous <Continuous>  senna 2 Tablet(s) Oral at bedtime  sodium chloride 0.9%. 1000 milliLiter(s) (10 mL/Hr) IV Continuous <Continuous>  vancomycin  IVPB 1000 milliGRAM(s) IV Intermittent every 12 hours  vasopressin Infusion 0.04 Unit(s)/Min (6 mL/Hr) IV Continuous <Continuous>    MEDICATIONS  (PRN):  dextrose Oral Gel 15 Gram(s) Oral once PRN Blood Glucose LESS THAN 70 milliGRAM(s)/deciliter  fentaNYL    Injectable 25 MICROGram(s) IV Push every 1 hour PRN Severe Pain (7 - 10)    HEPARIN:  [x] YES [] NO  Dose: 3000 UNITS Q12H  SCD's: YES b/l  GI Prophylaxis: Pepcid [x]    Post-Op Aspirin: Yes [x]  Post-Op Statin: Yes [x]  Post-Op Beta-Blockers: Yes [], No [x] on vasopressors    Allergies:  No Known Allergies      Ambulation/Activity Status: Ambulates several times daily with assistance.    Assessment/Plan:    62y Female status-post CABGx4 POD 1    -Pain management with tylenol and oxy prn  -Monitor HD's and maintain MAP 65-75  -Continue ASA/Statin/lovenox  -DC SG  -Wean Dobutrex by 1cc q 1 hour til off  -Will start low dose BB in afternoon if BP tolerates  -Hypoxia, wean FiO2 as tolerated by O2 saturation > 92%  -Continue CT's to suction  -Encourage IS. Chest PT, Nebs prn  -CXR in am  -OOB to chair. Ambulate with PT as tolerated  -PO diet. GI PPx with pepcid  -Continue insulin gtt. Monitor FS  -Monitor UOP/Lytes. Replete for hypokalemia/hypomagnesemia  -Continue malin  -Continue eloina-op abx. Monitor wbc/temp  -DVT ppx  -Continue CTU supportive care    -Continue to advance physical activity as tolerated and continue PT/OT as directed  -Case and plan discussed with CTU Intensivist and CT Surgeon - Dr. Moore/Devan/Dot

## 2023-03-24 NOTE — PHYSICAL THERAPY INITIAL EVALUATION ADULT - ADDITIONAL COMMENTS
Pt reports lives alone in apartment with elevator access, however her son will be staying with her upon d/c. Pt reports she was independent with functional mobility without AD prior to admission.

## 2023-03-25 LAB
ANION GAP SERPL CALC-SCNC: 14 MMOL/L — SIGNIFICANT CHANGE UP (ref 7–14)
ANION GAP SERPL CALC-SCNC: 17 MMOL/L — HIGH (ref 7–14)
BASOPHILS # BLD AUTO: 0.02 K/UL — SIGNIFICANT CHANGE UP (ref 0–0.2)
BASOPHILS NFR BLD AUTO: 0.2 % — SIGNIFICANT CHANGE UP (ref 0–1)
BUN SERPL-MCNC: 7 MG/DL — LOW (ref 10–20)
BUN SERPL-MCNC: 9 MG/DL — LOW (ref 10–20)
CALCIUM SERPL-MCNC: 8.7 MG/DL — SIGNIFICANT CHANGE UP (ref 8.4–10.5)
CALCIUM SERPL-MCNC: 8.8 MG/DL — SIGNIFICANT CHANGE UP (ref 8.4–10.5)
CHLORIDE SERPL-SCNC: 100 MMOL/L — SIGNIFICANT CHANGE UP (ref 98–110)
CHLORIDE SERPL-SCNC: 101 MMOL/L — SIGNIFICANT CHANGE UP (ref 98–110)
CO2 SERPL-SCNC: 22 MMOL/L — SIGNIFICANT CHANGE UP (ref 17–32)
CO2 SERPL-SCNC: 23 MMOL/L — SIGNIFICANT CHANGE UP (ref 17–32)
CREAT SERPL-MCNC: 0.6 MG/DL — LOW (ref 0.7–1.5)
CREAT SERPL-MCNC: 0.7 MG/DL — SIGNIFICANT CHANGE UP (ref 0.7–1.5)
EGFR: 100 ML/MIN/1.73M2 — SIGNIFICANT CHANGE UP
EGFR: 97 ML/MIN/1.73M2 — SIGNIFICANT CHANGE UP
EOSINOPHIL # BLD AUTO: 0.03 K/UL — SIGNIFICANT CHANGE UP (ref 0–0.7)
EOSINOPHIL NFR BLD AUTO: 0.3 % — SIGNIFICANT CHANGE UP (ref 0–8)
GLUCOSE BLDC GLUCOMTR-MCNC: 112 MG/DL — HIGH (ref 70–99)
GLUCOSE SERPL-MCNC: 138 MG/DL — HIGH (ref 70–99)
GLUCOSE SERPL-MCNC: 96 MG/DL — SIGNIFICANT CHANGE UP (ref 70–99)
HCT VFR BLD CALC: 27.7 % — LOW (ref 37–47)
HCT VFR BLD CALC: 29.7 % — LOW (ref 37–47)
HGB BLD-MCNC: 9.5 G/DL — LOW (ref 12–16)
HGB BLD-MCNC: 9.8 G/DL — LOW (ref 12–16)
IMM GRANULOCYTES NFR BLD AUTO: 0.4 % — HIGH (ref 0.1–0.3)
LYMPHOCYTES # BLD AUTO: 1.84 K/UL — SIGNIFICANT CHANGE UP (ref 1.2–3.4)
LYMPHOCYTES # BLD AUTO: 18.3 % — LOW (ref 20.5–51.1)
MAGNESIUM SERPL-MCNC: 1.7 MG/DL — LOW (ref 1.8–2.4)
MAGNESIUM SERPL-MCNC: 1.7 MG/DL — LOW (ref 1.8–2.4)
MCHC RBC-ENTMCNC: 30.4 PG — SIGNIFICANT CHANGE UP (ref 27–31)
MCHC RBC-ENTMCNC: 31.6 PG — HIGH (ref 27–31)
MCHC RBC-ENTMCNC: 33 G/DL — SIGNIFICANT CHANGE UP (ref 32–37)
MCHC RBC-ENTMCNC: 34.3 G/DL — SIGNIFICANT CHANGE UP (ref 32–37)
MCV RBC AUTO: 92 FL — SIGNIFICANT CHANGE UP (ref 81–99)
MCV RBC AUTO: 92.2 FL — SIGNIFICANT CHANGE UP (ref 81–99)
MONOCYTES # BLD AUTO: 0.89 K/UL — HIGH (ref 0.1–0.6)
MONOCYTES NFR BLD AUTO: 8.9 % — SIGNIFICANT CHANGE UP (ref 1.7–9.3)
NEUTROPHILS # BLD AUTO: 7.23 K/UL — HIGH (ref 1.4–6.5)
NEUTROPHILS NFR BLD AUTO: 71.9 % — SIGNIFICANT CHANGE UP (ref 42.2–75.2)
NRBC # BLD: 0 /100 WBCS — SIGNIFICANT CHANGE UP (ref 0–0)
NRBC # BLD: 0 /100 WBCS — SIGNIFICANT CHANGE UP (ref 0–0)
PLATELET # BLD AUTO: 145 K/UL — SIGNIFICANT CHANGE UP (ref 130–400)
PLATELET # BLD AUTO: 160 K/UL — SIGNIFICANT CHANGE UP (ref 130–400)
POTASSIUM SERPL-MCNC: 4 MMOL/L — SIGNIFICANT CHANGE UP (ref 3.5–5)
POTASSIUM SERPL-MCNC: 4.2 MMOL/L — SIGNIFICANT CHANGE UP (ref 3.5–5)
POTASSIUM SERPL-SCNC: 4 MMOL/L — SIGNIFICANT CHANGE UP (ref 3.5–5)
POTASSIUM SERPL-SCNC: 4.2 MMOL/L — SIGNIFICANT CHANGE UP (ref 3.5–5)
RBC # BLD: 3.01 M/UL — LOW (ref 4.2–5.4)
RBC # BLD: 3.22 M/UL — LOW (ref 4.2–5.4)
RBC # FLD: 14.1 % — SIGNIFICANT CHANGE UP (ref 11.5–14.5)
RBC # FLD: 14.1 % — SIGNIFICANT CHANGE UP (ref 11.5–14.5)
SODIUM SERPL-SCNC: 137 MMOL/L — SIGNIFICANT CHANGE UP (ref 135–146)
SODIUM SERPL-SCNC: 140 MMOL/L — SIGNIFICANT CHANGE UP (ref 135–146)
SURGICAL PATHOLOGY STUDY: SIGNIFICANT CHANGE UP
WBC # BLD: 10.05 K/UL — SIGNIFICANT CHANGE UP (ref 4.8–10.8)
WBC # BLD: 9.84 K/UL — SIGNIFICANT CHANGE UP (ref 4.8–10.8)
WBC # FLD AUTO: 10.05 K/UL — SIGNIFICANT CHANGE UP (ref 4.8–10.8)
WBC # FLD AUTO: 9.84 K/UL — SIGNIFICANT CHANGE UP (ref 4.8–10.8)

## 2023-03-25 PROCEDURE — 71045 X-RAY EXAM CHEST 1 VIEW: CPT | Mod: 26,77

## 2023-03-25 PROCEDURE — 93010 ELECTROCARDIOGRAM REPORT: CPT

## 2023-03-25 PROCEDURE — 99232 SBSQ HOSP IP/OBS MODERATE 35: CPT

## 2023-03-25 PROCEDURE — 71045 X-RAY EXAM CHEST 1 VIEW: CPT | Mod: 26

## 2023-03-25 RX ORDER — FUROSEMIDE 40 MG
40 TABLET ORAL DAILY
Refills: 0 | Status: DISCONTINUED | OUTPATIENT
Start: 2023-03-25 | End: 2023-03-28

## 2023-03-25 RX ORDER — CEFTRIAXONE 500 MG/1
INJECTION, POWDER, FOR SOLUTION INTRAMUSCULAR; INTRAVENOUS
Refills: 0 | Status: DISCONTINUED | OUTPATIENT
Start: 2023-03-25 | End: 2023-03-29

## 2023-03-25 RX ORDER — CEFTRIAXONE 500 MG/1
1000 INJECTION, POWDER, FOR SOLUTION INTRAMUSCULAR; INTRAVENOUS ONCE
Refills: 0 | Status: COMPLETED | OUTPATIENT
Start: 2023-03-25 | End: 2023-03-25

## 2023-03-25 RX ORDER — POTASSIUM CHLORIDE 20 MEQ
20 PACKET (EA) ORAL DAILY
Refills: 0 | Status: DISCONTINUED | OUTPATIENT
Start: 2023-03-25 | End: 2023-03-29

## 2023-03-25 RX ORDER — ACETAMINOPHEN 500 MG
1000 TABLET ORAL ONCE
Refills: 0 | Status: DISCONTINUED | OUTPATIENT
Start: 2023-03-25 | End: 2023-03-29

## 2023-03-25 RX ORDER — CEFTRIAXONE 500 MG/1
1000 INJECTION, POWDER, FOR SOLUTION INTRAMUSCULAR; INTRAVENOUS EVERY 24 HOURS
Refills: 0 | Status: DISCONTINUED | OUTPATIENT
Start: 2023-03-26 | End: 2023-03-29

## 2023-03-25 RX ADMIN — Medication 20 MILLIEQUIVALENT(S): at 14:05

## 2023-03-25 RX ADMIN — ATORVASTATIN CALCIUM 40 MILLIGRAM(S): 80 TABLET, FILM COATED ORAL at 21:53

## 2023-03-25 RX ADMIN — OXYCODONE HYDROCHLORIDE 5 MILLIGRAM(S): 5 TABLET ORAL at 22:03

## 2023-03-25 RX ADMIN — Medication 400 MILLIGRAM(S): at 00:00

## 2023-03-25 RX ADMIN — FAMOTIDINE 20 MILLIGRAM(S): 10 INJECTION INTRAVENOUS at 16:23

## 2023-03-25 RX ADMIN — CHLORHEXIDINE GLUCONATE 1 APPLICATION(S): 213 SOLUTION TOPICAL at 14:05

## 2023-03-25 RX ADMIN — OXYCODONE HYDROCHLORIDE 5 MILLIGRAM(S): 5 TABLET ORAL at 22:30

## 2023-03-25 RX ADMIN — Medication 100 MILLIGRAM(S): at 06:00

## 2023-03-25 RX ADMIN — CEFTRIAXONE 1000 MILLIGRAM(S): 500 INJECTION, POWDER, FOR SOLUTION INTRAMUSCULAR; INTRAVENOUS at 14:04

## 2023-03-25 RX ADMIN — FAMOTIDINE 20 MILLIGRAM(S): 10 INJECTION INTRAVENOUS at 16:22

## 2023-03-25 RX ADMIN — Medication 40 MILLIGRAM(S): at 09:41

## 2023-03-25 RX ADMIN — Medication 100 MILLIGRAM(S): at 14:04

## 2023-03-25 RX ADMIN — HEPARIN SODIUM 3000 UNIT(S): 5000 INJECTION INTRAVENOUS; SUBCUTANEOUS at 18:01

## 2023-03-25 RX ADMIN — HEPARIN SODIUM 3000 UNIT(S): 5000 INJECTION INTRAVENOUS; SUBCUTANEOUS at 06:00

## 2023-03-25 RX ADMIN — SENNA PLUS 2 TABLET(S): 8.6 TABLET ORAL at 21:53

## 2023-03-25 RX ADMIN — Medication 400 MILLIGRAM(S): at 06:00

## 2023-03-25 RX ADMIN — Medication 325 MILLIGRAM(S): at 14:04

## 2023-03-25 RX ADMIN — FAMOTIDINE 20 MILLIGRAM(S): 10 INJECTION INTRAVENOUS at 18:00

## 2023-03-25 RX ADMIN — Medication 1000 MILLIGRAM(S): at 18:01

## 2023-03-25 RX ADMIN — Medication 250 MILLIGRAM(S): at 00:00

## 2023-03-25 RX ADMIN — Medication 400 MILLIGRAM(S): at 18:00

## 2023-03-25 NOTE — PROGRESS NOTE ADULT - SUBJECTIVE AND OBJECTIVE BOX
CTU Attending Progress Daily Note     25 Mar 2023 21:09  POD# - 2  He has history of Hypertension    High cholesterol      Interval event for past 24 hr:  JENNIFER TORRES  62y had no event.   Current Complains:  JENNIFER TORRES has no new complains  HPI:  63 y/o female with PMHx of HTN, HLD, COPD not on home O2, pre-DM, active smoker presented to the ED for chest tightness since 9am yesterday. She reports after breakfast she sat on the couch watching tv, when she suddenly developed substernal chest tightness with SOB and flushing. She got up to get her inhalers when she became dizzy prompting her ED visit yesterday. She described the chest tightness as non-radiating, substernal, improves on ambulation, worse on rest. She denies any similar episodes in the past. Denies any fevers, chills, abdominal pain, nausea, vomiting, diarrhea, constipation, LE swelling. Never had stress test before, no cardiac cath done. FH+ father  from MI at age 58.    In the ED, VS noted for T 36.4, HR 70, /77, RR 17, SpO2 98%. Labs noted for WBC 5, Hg 12, plt 187, K 4.3, Cr 0.9, Glu 96. Trop <0.01 x2. EKG showed sinus with PVCS and nonspecific T wave abnormality. Placed in ED-obs for CCTA. CCTA showed high calcium score. Admitted for nuclear stress test. Currently symptom free.  (18 Mar 2023 14:58)    OBJECTIVE:  ICU Vital Signs Last 24 Hrs  T(C): 37.1 (25 Mar 2023 19:30), Max: 38.8 (25 Mar 2023 18:00)  T(F): 98.8 (25 Mar 2023 19:30), Max: 101.8 (25 Mar 2023 18:00)  HR: 104 (25 Mar 2023 19:30) (83 - 116)  BP: 105/56 (25 Mar 2023 19:30) (105/56 - 105/56)  BP(mean): 77 (25 Mar 2023 19:30) (77 - 77)  ABP: 101/58 (25 Mar 2023 19:30) (91/40 - 161/67)  ABP(mean): 56 (25 Mar 2023 19:30) (55 - 94)  RR: 23 (25 Mar 2023 19:30) (17 - 35)  SpO2: 92% (25 Mar 2023 19:30) (84% - 93%)    O2 Parameters below as of 25 Mar 2023 19:30  Patient On (Oxygen Delivery Method): nasal cannula  O2 Flow (L/min): 5        I&O's Summary    24 Mar 2023 07:  -  25 Mar 2023 07:00  --------------------------------------------------------  IN: 1813.8 mL / OUT: 858 mL / NET: 955.8 mL    25 Mar 2023 07:  -  25 Mar 2023 21:09  --------------------------------------------------------  IN: 0 mL / OUT: 2206 mL / NET: -2206 mL      I&O's Detail    24 Mar 2023 07:  -  25 Mar 2023 07:00  --------------------------------------------------------  IN:    Amiodarone: 83.3 mL    DOBUTamine: 15.5 mL    Insulin: 6 mL    IV PiggyBack: 600 mL    Norepinephrine: 9 mL    Oral Fluid: 1020 mL    sodium chloride 0.9%: 80 mL  Total IN: 1813.8 mL    OUT:    Chest Tube (mL): 114 mL    Chest Tube (mL): 74 mL    Indwelling Catheter - Urethral (mL): 670 mL  Total OUT: 858 mL    Total NET: 955.8 mL      25 Mar 2023 07:01  -  25 Mar 2023 21:09  --------------------------------------------------------  IN:  Total IN: 0 mL    OUT:    Chest Tube (mL): 80 mL    Indwelling Catheter - Urethral (mL): 2126 mL  Total OUT: 2206 mL    Total NET: -2206 mL        Adult Advanced Hemodynamics Last 24 Hrs  CVP(mm Hg): --  CVP(cm H2O): --  CO: --  CI: --  PA: --  PA(mean): --  PCWP: --  SVR: --  SVRI: --  PVR: --  PVRI: --    CAPILLARY BLOOD GLUCOSE      POCT Blood Glucose.: 112 mg/dL (25 Mar 2023 06:30)    LABS:  ABG - ( 24 Mar 2023 04:20 )  pH, Arterial: 7.38  pH, Blood: x     /  pCO2: 37    /  pO2: 90    / HCO3: 22    / Base Excess: -2.9  /  SaO2: 98.4                                    9.8    9.84  )-----------( 160      ( 25 Mar 2023 20:41 )             29.7         137  |  101  |  9<L>  ----------------------------<  138<H>  4.0   |  22  |  0.7    Ca    8.7      25 Mar 2023 19:37  Mg     1.7         TPro  6.0  /  Alb  3.6  /  TBili  0.5  /  DBili  x   /  AST  30  /  ALT  13  /  AlkPhos  67  03-24          Culture - Tissue with Gram Stain (collected 23 Mar 2023 09:36)  Source: .Tissue LEFT INTERNAL MAMMARY LYMPH NODE  Gram Stain (23 Mar 2023 23:26):    No polymorphonuclear cells seen per low power field    No organisms seen per oil power field  Preliminary Report (24 Mar 2023 15:31):    No growth      Home Medications:  Albuterol (Eqv-Ventolin HFA) 90 mcg/inh inhalation aerosol: 2 puff(s) inhaled every 6 hours (18 Mar 2023 15:42)  amLODIPine 10 mg oral tablet: 1 tab(s) orally once a day (18 Mar 2023 15:42)  Aspir 81 oral delayed release tablet: 1 tab(s) orally once a day (18 Mar 2023 15:42)  atorvastatin 40 mg oral tablet: 1 tab(s) orally once a day (18 Mar 2023 15:42)  carvedilol 6.25 mg oral tablet: 1 tab(s) orally 2 times a day (18 Mar 2023 15:41)  Combivent Respimat 20 mcg-100 mcg/inh inhalation aerosol: 1 puff(s) inhaled 4 times a day (18 Mar 2023 15:41)    HOSPITAL MEDICATIONS:  MEDICATIONS  (STANDING):  acetaminophen   IVPB .. 1000 milliGRAM(s) IV Intermittent once  albuterol    90 MICROgram(s) HFA Inhaler 2 Puff(s) Inhalation every 6 hours  aMIOdarone Infusion 1 mG/Min (33.3 mL/Hr) IV Continuous <Continuous>  aspirin enteric coated 325 milliGRAM(s) Oral daily  atorvastatin 40 milliGRAM(s) Oral at bedtime  cefTRIAXone   IVPB      chlorhexidine 2% Cloths 1 Application(s) Topical daily  dextrose 5%. 1000 milliLiter(s) (50 mL/Hr) IV Continuous <Continuous>  dextrose 5%. 1000 milliLiter(s) (100 mL/Hr) IV Continuous <Continuous>  dextrose 50% Injectable 25 Gram(s) IV Push once  dextrose 50% Injectable 12.5 Gram(s) IV Push once  dextrose 50% Injectable 25 Gram(s) IV Push once  dextrose 50% Injectable 25 milliLiter(s) IV Push every 15 minutes  dextrose 50% Injectable 50 milliLiter(s) IV Push every 15 minutes  DOBUTamine Infusion 3 MICROgram(s)/kG/Min (9.19 mL/Hr) IV Continuous <Continuous>  famotidine    Tablet 20 milliGRAM(s) Oral two times a day  furosemide   Injectable 40 milliGRAM(s) IV Push daily  glucagon  Injectable 1 milliGRAM(s) IntraMuscular once  heparin   Injectable 3000 Unit(s) SubCutaneous every 12 hours  insulin regular Infusion 1 Unit(s)/Hr (1 mL/Hr) IV Continuous <Continuous>  ipratropium 17 MICROgram(s) HFA Inhaler 2 Puff(s) Inhalation every 6 hours  meperidine     Injectable 25 milliGRAM(s) IV Push once  nitroglycerin  Infusion 5 MICROgram(s)/Min (1.5 mL/Hr) IV Continuous <Continuous>  polyethylene glycol 3350 17 Gram(s) Oral daily  potassium chloride    Tablet ER 20 milliEquivalent(s) Oral daily  senna 2 Tablet(s) Oral at bedtime  sodium chloride 0.9%. 1000 milliLiter(s) (10 mL/Hr) IV Continuous <Continuous>    MEDICATIONS  (PRN):  dextrose Oral Gel 15 Gram(s) Oral once PRN Blood Glucose LESS THAN 70 milliGRAM(s)/deciliter  oxyCODONE    IR 5 milliGRAM(s) Oral every 4 hours PRN Moderate Pain (4 - 6)  oxyCODONE    IR 10 milliGRAM(s) Oral every 4 hours PRN Severe Pain (7 - 10)      REVIEW OF SYSTEMS:  CONSTITUTIONAL: [X] all negative; [ ] weakness, [ ] fevers, [ ] chills  EYES/ENT: [X] all negative; [ ] visual changes, [ ] vertigo, [ ] throat pain   NECK: [X] all negative; [ ] pain, [ ] stiffness  RESPIRATORY: [] all negative, [ ] cough, [ ] wheezing, [ ] hemoptysis, [ ] shortness of breath  CARDIOVASCULAR: [] all negative; [ ] chest pain, [ ] palpitations, [ ] orthopnea  GASTROINTESTINAL: [X] all negative; [ ]abdominal pain, [ ] nausea, [ ] vomiting, [ ] hematemesis, [ ] diarrhea, [ ] constipation, [ ] melena, [ ] hematochezia.  GENITOURINARY: [X] all negative; [ ] dysuria, [ ] frequency, [ ] hematuria  NEUROLOGICAL: [X] all negative; [ ] numbness, [ ] weakness  SKIN: [X] all negative; [ ] itching, [ ] burning, [ ] rashes, [ ] lesions   All other review of systems is negative unless indicated above.    [  ] Unable to assess ROS because     PHYSICAL EXAM:          CONSTITUTIONAL: Well-developed; well-nourished; in no acute distress.   	SKIN: warm, dry  	HEAD: Normocephalic; atraumatic.  	EYES: PERRL, EOM, no conj injection, sclera clear  	ENT: No nasal discharge; airway clear.  	NECK: Supple; non tender.  No midline ttp ctls  	CARD: S1, S2 normal; no murmurs, gallops, or rubs. Regular rate and rhythm. 2+ RPs and DPs bilat, no carotid bruits, no pedal   edema, no calf pain b/l  	RESP: CTA  bilat good air movement No wheezes, rales or rhonchi.  	ABD: Soft, not tender, not distended, no CVA ttp no rebound or guarding, bowel sounds present  	EXT: Normal ROM.  No clubbing, cyanosis or edema.   	  	NEURO: Alert, awake, motor 5/5 R, 5/5 L        RADIOLOGY:  xray  < from: Xray Chest 1 View- PORTABLE-Routine (23 @ 02:55) >  Impression:    Bilateral lower lobe opacity/pleural effusion no change. No air leak.        < end of copied text >    I spent 45 minutes of critical care time ; more than 50% of visit was spent counseling and/or examining patient, reviewing vitals, labs, medications, imaging and discussing with the team goals of care to prevent life-threatening in this patient who is at high risk for deterioration or death due to:

## 2023-03-25 NOTE — PROVIDER CONTACT NOTE (OTHER) - ACTION/TREATMENT ORDERED:
3/24/23 22:00-10:00: See paper records for clinical information entered during [employer] downtime.  3/25/23 10:00: Patient was discharged during [employer] downtime; see paper record for discharge info.

## 2023-03-26 LAB
ANION GAP SERPL CALC-SCNC: 10 MMOL/L — SIGNIFICANT CHANGE UP (ref 7–14)
ANION GAP SERPL CALC-SCNC: 13 MMOL/L — SIGNIFICANT CHANGE UP (ref 7–14)
BASOPHILS # BLD AUTO: 0.02 K/UL — SIGNIFICANT CHANGE UP (ref 0–0.2)
BASOPHILS NFR BLD AUTO: 0.2 % — SIGNIFICANT CHANGE UP (ref 0–1)
BUN SERPL-MCNC: 10 MG/DL — SIGNIFICANT CHANGE UP (ref 10–20)
BUN SERPL-MCNC: 8 MG/DL — LOW (ref 10–20)
CALCIUM SERPL-MCNC: 8.8 MG/DL — SIGNIFICANT CHANGE UP (ref 8.4–10.5)
CALCIUM SERPL-MCNC: 9 MG/DL — SIGNIFICANT CHANGE UP (ref 8.4–10.4)
CHLORIDE SERPL-SCNC: 103 MMOL/L — SIGNIFICANT CHANGE UP (ref 98–110)
CHLORIDE SERPL-SCNC: 98 MMOL/L — SIGNIFICANT CHANGE UP (ref 98–110)
CO2 SERPL-SCNC: 24 MMOL/L — SIGNIFICANT CHANGE UP (ref 17–32)
CO2 SERPL-SCNC: 24 MMOL/L — SIGNIFICANT CHANGE UP (ref 17–32)
CREAT SERPL-MCNC: 0.6 MG/DL — LOW (ref 0.7–1.5)
CREAT SERPL-MCNC: 0.7 MG/DL — SIGNIFICANT CHANGE UP (ref 0.7–1.5)
EGFR: 100 ML/MIN/1.73M2 — SIGNIFICANT CHANGE UP
EGFR: 97 ML/MIN/1.73M2 — SIGNIFICANT CHANGE UP
EOSINOPHIL # BLD AUTO: 0.08 K/UL — SIGNIFICANT CHANGE UP (ref 0–0.7)
EOSINOPHIL NFR BLD AUTO: 1 % — SIGNIFICANT CHANGE UP (ref 0–8)
GAS PNL BLDA: SIGNIFICANT CHANGE UP
GLUCOSE BLDC GLUCOMTR-MCNC: 113 MG/DL — HIGH (ref 70–99)
GLUCOSE BLDC GLUCOMTR-MCNC: 116 MG/DL — HIGH (ref 70–99)
GLUCOSE BLDC GLUCOMTR-MCNC: 121 MG/DL — HIGH (ref 70–99)
GLUCOSE SERPL-MCNC: 100 MG/DL — HIGH (ref 70–99)
GLUCOSE SERPL-MCNC: 109 MG/DL — HIGH (ref 70–99)
HCT VFR BLD CALC: 27.1 % — LOW (ref 37–47)
HCT VFR BLD CALC: 29.7 % — LOW (ref 37–47)
HGB BLD-MCNC: 9 G/DL — LOW (ref 12–16)
HGB BLD-MCNC: 9.7 G/DL — LOW (ref 12–16)
IMM GRANULOCYTES NFR BLD AUTO: 0.5 % — HIGH (ref 0.1–0.3)
LYMPHOCYTES # BLD AUTO: 1.85 K/UL — SIGNIFICANT CHANGE UP (ref 1.2–3.4)
LYMPHOCYTES # BLD AUTO: 22.1 % — SIGNIFICANT CHANGE UP (ref 20.5–51.1)
MAGNESIUM SERPL-MCNC: 1.8 MG/DL — SIGNIFICANT CHANGE UP (ref 1.8–2.4)
MAGNESIUM SERPL-MCNC: 2.2 MG/DL — SIGNIFICANT CHANGE UP (ref 1.8–2.4)
MCHC RBC-ENTMCNC: 30 PG — SIGNIFICANT CHANGE UP (ref 27–31)
MCHC RBC-ENTMCNC: 30.6 PG — SIGNIFICANT CHANGE UP (ref 27–31)
MCHC RBC-ENTMCNC: 32.7 G/DL — SIGNIFICANT CHANGE UP (ref 32–37)
MCHC RBC-ENTMCNC: 33.2 G/DL — SIGNIFICANT CHANGE UP (ref 32–37)
MCV RBC AUTO: 92 FL — SIGNIFICANT CHANGE UP (ref 81–99)
MCV RBC AUTO: 92.2 FL — SIGNIFICANT CHANGE UP (ref 81–99)
MONOCYTES # BLD AUTO: 0.9 K/UL — HIGH (ref 0.1–0.6)
MONOCYTES NFR BLD AUTO: 10.8 % — HIGH (ref 1.7–9.3)
NEUTROPHILS # BLD AUTO: 5.47 K/UL — SIGNIFICANT CHANGE UP (ref 1.4–6.5)
NEUTROPHILS NFR BLD AUTO: 65.4 % — SIGNIFICANT CHANGE UP (ref 42.2–75.2)
NRBC # BLD: 0 /100 WBCS — SIGNIFICANT CHANGE UP (ref 0–0)
NRBC # BLD: 0 /100 WBCS — SIGNIFICANT CHANGE UP (ref 0–0)
PLATELET # BLD AUTO: 128 K/UL — LOW (ref 130–400)
PLATELET # BLD AUTO: 176 K/UL — SIGNIFICANT CHANGE UP (ref 130–400)
POTASSIUM SERPL-MCNC: 4 MMOL/L — SIGNIFICANT CHANGE UP (ref 3.5–5)
POTASSIUM SERPL-MCNC: 4.6 MMOL/L — SIGNIFICANT CHANGE UP (ref 3.5–5)
POTASSIUM SERPL-SCNC: 4 MMOL/L — SIGNIFICANT CHANGE UP (ref 3.5–5)
POTASSIUM SERPL-SCNC: 4.6 MMOL/L — SIGNIFICANT CHANGE UP (ref 3.5–5)
RBC # BLD: 2.94 M/UL — LOW (ref 4.2–5.4)
RBC # BLD: 3.23 M/UL — LOW (ref 4.2–5.4)
RBC # FLD: 13.8 % — SIGNIFICANT CHANGE UP (ref 11.5–14.5)
RBC # FLD: 13.9 % — SIGNIFICANT CHANGE UP (ref 11.5–14.5)
SODIUM SERPL-SCNC: 135 MMOL/L — SIGNIFICANT CHANGE UP (ref 135–146)
SODIUM SERPL-SCNC: 137 MMOL/L — SIGNIFICANT CHANGE UP (ref 135–146)
WBC # BLD: 8.36 K/UL — SIGNIFICANT CHANGE UP (ref 4.8–10.8)
WBC # BLD: 9.9 K/UL — SIGNIFICANT CHANGE UP (ref 4.8–10.8)
WBC # FLD AUTO: 8.36 K/UL — SIGNIFICANT CHANGE UP (ref 4.8–10.8)
WBC # FLD AUTO: 9.9 K/UL — SIGNIFICANT CHANGE UP (ref 4.8–10.8)

## 2023-03-26 PROCEDURE — 99232 SBSQ HOSP IP/OBS MODERATE 35: CPT

## 2023-03-26 PROCEDURE — 93010 ELECTROCARDIOGRAM REPORT: CPT

## 2023-03-26 PROCEDURE — 71045 X-RAY EXAM CHEST 1 VIEW: CPT | Mod: 26

## 2023-03-26 RX ORDER — FUROSEMIDE 40 MG
40 TABLET ORAL ONCE
Refills: 0 | Status: COMPLETED | OUTPATIENT
Start: 2023-03-26 | End: 2023-03-26

## 2023-03-26 RX ORDER — IPRATROPIUM/ALBUTEROL SULFATE 18-103MCG
3 AEROSOL WITH ADAPTER (GRAM) INHALATION EVERY 6 HOURS
Refills: 0 | Status: DISCONTINUED | OUTPATIENT
Start: 2023-03-26 | End: 2023-03-29

## 2023-03-26 RX ORDER — METOPROLOL TARTRATE 50 MG
12.5 TABLET ORAL EVERY 12 HOURS
Refills: 0 | Status: DISCONTINUED | OUTPATIENT
Start: 2023-03-26 | End: 2023-03-27

## 2023-03-26 RX ORDER — POTASSIUM CHLORIDE 20 MEQ
20 PACKET (EA) ORAL
Refills: 0 | Status: COMPLETED | OUTPATIENT
Start: 2023-03-26 | End: 2023-03-26

## 2023-03-26 RX ORDER — MAGNESIUM SULFATE 500 MG/ML
2 VIAL (ML) INJECTION ONCE
Refills: 0 | Status: COMPLETED | OUTPATIENT
Start: 2023-03-26 | End: 2023-03-26

## 2023-03-26 RX ORDER — INSULIN LISPRO 100/ML
VIAL (ML) SUBCUTANEOUS
Refills: 0 | Status: DISCONTINUED | OUTPATIENT
Start: 2023-03-26 | End: 2023-03-29

## 2023-03-26 RX ADMIN — OXYCODONE HYDROCHLORIDE 10 MILLIGRAM(S): 5 TABLET ORAL at 11:52

## 2023-03-26 RX ADMIN — SENNA PLUS 2 TABLET(S): 8.6 TABLET ORAL at 21:21

## 2023-03-26 RX ADMIN — Medication 20 MILLIEQUIVALENT(S): at 11:08

## 2023-03-26 RX ADMIN — ALBUTEROL 2 PUFF(S): 90 AEROSOL, METERED ORAL at 14:00

## 2023-03-26 RX ADMIN — Medication 12.5 MILLIGRAM(S): at 07:30

## 2023-03-26 RX ADMIN — Medication 2 PUFF(S): at 08:15

## 2023-03-26 RX ADMIN — Medication 5 MILLIGRAM(S): at 17:22

## 2023-03-26 RX ADMIN — Medication 2 PUFF(S): at 14:12

## 2023-03-26 RX ADMIN — POLYETHYLENE GLYCOL 3350 17 GRAM(S): 17 POWDER, FOR SOLUTION ORAL at 11:09

## 2023-03-26 RX ADMIN — Medication 325 MILLIGRAM(S): at 11:09

## 2023-03-26 RX ADMIN — Medication 20 MILLIEQUIVALENT(S): at 13:44

## 2023-03-26 RX ADMIN — OXYCODONE HYDROCHLORIDE 10 MILLIGRAM(S): 5 TABLET ORAL at 22:00

## 2023-03-26 RX ADMIN — OXYCODONE HYDROCHLORIDE 10 MILLIGRAM(S): 5 TABLET ORAL at 06:30

## 2023-03-26 RX ADMIN — Medication 20 MILLIEQUIVALENT(S): at 07:30

## 2023-03-26 RX ADMIN — ALBUTEROL 2 PUFF(S): 90 AEROSOL, METERED ORAL at 19:36

## 2023-03-26 RX ADMIN — FAMOTIDINE 20 MILLIGRAM(S): 10 INJECTION INTRAVENOUS at 17:23

## 2023-03-26 RX ADMIN — Medication 40 MILLIGRAM(S): at 22:31

## 2023-03-26 RX ADMIN — CEFTRIAXONE 100 MILLIGRAM(S): 500 INJECTION, POWDER, FOR SOLUTION INTRAMUSCULAR; INTRAVENOUS at 11:08

## 2023-03-26 RX ADMIN — HEPARIN SODIUM 3000 UNIT(S): 5000 INJECTION INTRAVENOUS; SUBCUTANEOUS at 17:21

## 2023-03-26 RX ADMIN — ATORVASTATIN CALCIUM 40 MILLIGRAM(S): 80 TABLET, FILM COATED ORAL at 21:18

## 2023-03-26 RX ADMIN — Medication 25 GRAM(S): at 11:07

## 2023-03-26 RX ADMIN — Medication 2 PUFF(S): at 19:39

## 2023-03-26 RX ADMIN — Medication 12.5 MILLIGRAM(S): at 17:22

## 2023-03-26 RX ADMIN — OXYCODONE HYDROCHLORIDE 10 MILLIGRAM(S): 5 TABLET ORAL at 11:22

## 2023-03-26 RX ADMIN — ALBUTEROL 2 PUFF(S): 90 AEROSOL, METERED ORAL at 08:00

## 2023-03-26 RX ADMIN — Medication 40 MILLIGRAM(S): at 05:56

## 2023-03-26 RX ADMIN — FAMOTIDINE 20 MILLIGRAM(S): 10 INJECTION INTRAVENOUS at 05:55

## 2023-03-26 RX ADMIN — HEPARIN SODIUM 3000 UNIT(S): 5000 INJECTION INTRAVENOUS; SUBCUTANEOUS at 05:56

## 2023-03-26 RX ADMIN — OXYCODONE HYDROCHLORIDE 10 MILLIGRAM(S): 5 TABLET ORAL at 21:15

## 2023-03-26 RX ADMIN — OXYCODONE HYDROCHLORIDE 10 MILLIGRAM(S): 5 TABLET ORAL at 06:00

## 2023-03-26 NOTE — PROGRESS NOTE ADULT - SUBJECTIVE AND OBJECTIVE BOX
Over Night Events:  no major events overnight      ROS:  See HPI    PHYSICAL EXAM    ICU Vital Signs Last 24 Hrs  T(C): 38.1 (26 Mar 2023 06:00), Max: 38.8 (25 Mar 2023 18:00)  T(F): 100.6 (26 Mar 2023 06:00), Max: 101.8 (25 Mar 2023 18:00)  HR: 103 (26 Mar 2023 10:00) (95 - 116)  BP: 124/64 (26 Mar 2023 09:00) (96/57 - 134/76)  BP(mean): 88 (26 Mar 2023 09:00) (73 - 97)  ABP: 116/57 (26 Mar 2023 10:00) (91/40 - 161/67)  ABP(mean): 75 (26 Mar 2023 10:00) (55 - 94)  RR: 21 (26 Mar 2023 10:00) (12 - 35)  SpO2: 98% (26 Mar 2023 10:00) (84% - 99%)    O2 Parameters below as of 26 Mar 2023 10:00  Patient On (Oxygen Delivery Method): nasal cannula w/ humidification  O2 Flow (L/min): 5          General: NARD  HEENT: BEVERLY             Lungs: Bilateral BS decreased at the bases  Cardiovascular: Regular   Abdomen: Soft, Positive BS  Extremities: No edema  Skin: Warm  Neurological: Non focal, following commands      03-25-23 @ 07:01  -  03-26-23 @ 07:00  --------------------------------------------------------  IN:    Oral Fluid: 250 mL    sodium chloride 0.9%: 20 mL  Total IN: 270 mL    OUT:    Chest Tube (mL): 80 mL    Indwelling Catheter - Urethral (mL): 4341 mL  Total OUT: 4421 mL    Total NET: -4151 mL      03-26-23 @ 07:01  -  03-26-23 @ 10:30  --------------------------------------------------------  IN:    Oral Fluid: 240 mL  Total IN: 240 mL    OUT:    Indwelling Catheter - Urethral (mL): 350 mL  Total OUT: 350 mL    Total NET: -110 mL          LABS:                          9.0    8.36  )-----------( 128      ( 26 Mar 2023 03:00 )             27.1                                               03-26    137  |  103  |  8<L>  ----------------------------<  100<H>  4.0   |  24  |  0.6<L>    Ca    8.8      26 Mar 2023 03:00  Mg     1.8     03-26    TPro  6.0  /  Alb  3.6  /  TBili  0.5  /  DBili  x   /  AST  30  /  ALT  13  /  AlkPhos  67  03-24                                                                                           LIVER FUNCTIONS - ( 24 Mar 2023 21:09 )  Alb: 3.6 g/dL / Pro: 6.0 g/dL / ALK PHOS: 67 U/L / ALT: 13 U/L / AST: 30 U/L / GGT: x                                                                                                                                   ABG - ( 26 Mar 2023 02:38 )  pH, Arterial: 7.45  pH, Blood: x     /  pCO2: 37    /  pO2: 69    / HCO3: 26    / Base Excess: 1.7   /  SaO2: 96.1                MEDICATIONS  (STANDING):  acetaminophen   IVPB .. 1000 milliGRAM(s) IV Intermittent once  albuterol    90 MICROgram(s) HFA Inhaler 2 Puff(s) Inhalation every 6 hours  aspirin enteric coated 325 milliGRAM(s) Oral daily  atorvastatin 40 milliGRAM(s) Oral at bedtime  cefTRIAXone   IVPB      cefTRIAXone   IVPB 1000 milliGRAM(s) IV Intermittent every 24 hours  chlorhexidine 2% Cloths 1 Application(s) Topical daily  dextrose 5%. 1000 milliLiter(s) (50 mL/Hr) IV Continuous <Continuous>  dextrose 5%. 1000 milliLiter(s) (100 mL/Hr) IV Continuous <Continuous>  dextrose 50% Injectable 25 Gram(s) IV Push once  dextrose 50% Injectable 12.5 Gram(s) IV Push once  dextrose 50% Injectable 25 Gram(s) IV Push once  dextrose 50% Injectable 50 milliLiter(s) IV Push every 15 minutes  dextrose 50% Injectable 25 milliLiter(s) IV Push every 15 minutes  famotidine    Tablet 20 milliGRAM(s) Oral two times a day  furosemide   Injectable 40 milliGRAM(s) IV Push daily  glucagon  Injectable 1 milliGRAM(s) IntraMuscular once  heparin   Injectable 3000 Unit(s) SubCutaneous every 12 hours  ipratropium 17 MICROgram(s) HFA Inhaler 2 Puff(s) Inhalation every 6 hours  meperidine     Injectable 25 milliGRAM(s) IV Push once  metoprolol tartrate 12.5 milliGRAM(s) Oral every 12 hours  nitroglycerin  Infusion 5 MICROgram(s)/Min (1.5 mL/Hr) IV Continuous <Continuous>  polyethylene glycol 3350 17 Gram(s) Oral daily  potassium chloride    Tablet ER 20 milliEquivalent(s) Oral daily  potassium chloride    Tablet ER 20 milliEquivalent(s) Oral every 2 hours  senna 2 Tablet(s) Oral at bedtime  sodium chloride 0.9%. 1000 milliLiter(s) (10 mL/Hr) IV Continuous <Continuous>    MEDICATIONS  (PRN):  dextrose Oral Gel 15 Gram(s) Oral once PRN Blood Glucose LESS THAN 70 milliGRAM(s)/deciliter  oxyCODONE    IR 5 milliGRAM(s) Oral every 4 hours PRN Moderate Pain (4 - 6)  oxyCODONE    IR 10 milliGRAM(s) Oral every 4 hours PRN Severe Pain (7 - 10)      Xrays:                                                                                     ECHO  B/l basilar oapcities/effusion

## 2023-03-26 NOTE — PROGRESS NOTE ADULT - SUBJECTIVE AND OBJECTIVE BOX
OPERATIVE PROCEDURE(s):                POD #   3 CABG                    62yFemale  SURGEON(s): DEYVI Chris  SUBJECTIVE ASSESSMENT: c/o incisional pain, responds to pain meds    Vital Signs Last 24 Hrs  T(F): 100.6 (26 Mar 2023 06:00), Max: 101.8 (25 Mar 2023 18:00)  HR: 108 (26 Mar 2023 07:00) (94 - 116)  BP: 134/76 (26 Mar 2023 07:00) (96/57 - 134/76)  BP(mean): 95 (26 Mar 2023 07:00) (73 - 97)  ABP: 140/68 (26 Mar 2023 07:00) (91/40 - 161/67)  ABP(mean): 91 (26 Mar 2023 07:00)  RR: 27 (26 Mar 2023 07:00) (12 - 35)  SpO2: 94% (26 Mar 2023 07:00) (84% - 99%)    I&O's Detail    25 Mar 2023 07:01  -  26 Mar 2023 07:00  --------------------------------------------------------  IN:    Oral Fluid: 250 mL    sodium chloride 0.9%: 20 mL  Total IN: 270 mL    OUT:    Chest Tube (mL): 80 mL    Indwelling Catheter - Urethral (mL): 4076 mL  Total OUT: 4156 mL    Net:   I&O's Detail    24 Mar 2023 07:01  -  25 Mar 2023 07:00  --------------------------------------------------------  Total NET: 955.8 mL      25 Mar 2023 07:01  -  26 Mar 2023 07:00  --------------------------------------------------------  Total NET: -3886 mL    CAPILLARY BLOOD GLUCOSE      Physical Exam:  General: NAD; A&Ox3  Cardiac: S1/S2, RRR, no murmur, no rubs  Lungs: unlabored shallow respirations, bilateral bs decreased at bases  Abdomen: Soft/NT/protuberant  Sternum: Intact, no click, incision healing well with no drainage  Incisions: Incisions clean/dry/intact  Extremities: No edema b/l lower extremities    Central Venous Catheter: Yes[]  critical patient   Kovacs Catheter: Yes  [] , critical patient strict I&O  NGT: Yes []   EPICARDIAL WIRES:  [] YES  BOWEL MOVEMENT:  [] YES [] NO,   CHEST TUBE(Left/Right):  [] YES [] NO      LABS:                        9.0<L>  8.36  )-----------( 128<L>    ( 26 Mar 2023 03:00 )             27.1<L>                        9.8<L>  9.84  )-----------( 160      ( 25 Mar 2023 20:41 )             29.7<L>    03-26    137  |  103  |  8<L>  ----------------------------<  100<H>  4.0   |  24  |  0.6<L>  03-25    140  |  100  |  7<L>  ----------------------------<  96  4.2   |  23  |  0.6<L>    Ca    8.8      26 Mar 2023 03:00  Mg     1.8     03-26    TPro  6.0 [6.0 - 8.0]  /  Alb  3.6 [3.5 - 5.2]  /  TBili  0.5 [0.2 - 1.2]  /  DBili  x   /  AST  30 [0 - 41]  /  ALT  13 [0 - 41]  /  AlkPhos  67 [30 - 115]  03-24    ABG - ( 26 Mar 2023 02:38 )  pH: 7.45  /  pCO2: 37    /  pO2: 69    / HCO3: 26    / Base Excess: 1.7   /  SaO2: 96.1  /  LA: 0.60     RADIOLOGY & ADDITIONAL TESTS:  CXR:  EKG:  MEDICATIONS  (STANDING):  acetaminophen   IVPB .. 1000 milliGRAM(s) IV Intermittent once  albuterol    90 MICROgram(s) HFA Inhaler 2 Puff(s) Inhalation every 6 hours  aMIOdarone Infusion 1 mG/Min (33.3 mL/Hr) IV Continuous <Continuous>  aspirin enteric coated 325 milliGRAM(s) Oral daily  atorvastatin 40 milliGRAM(s) Oral at bedtime  cefTRIAXone   IVPB      cefTRIAXone   IVPB 1000 milliGRAM(s) IV Intermittent every 24 hours  chlorhexidine 2% Cloths 1 Application(s) Topical daily  dextrose 5%. 1000 milliLiter(s) (50 mL/Hr) IV Continuous <Continuous>  dextrose 5%. 1000 milliLiter(s) (100 mL/Hr) IV Continuous <Continuous>  dextrose 50% Injectable 25 Gram(s) IV Push once  dextrose 50% Injectable 12.5 Gram(s) IV Push once  dextrose 50% Injectable 25 Gram(s) IV Push once  dextrose 50% Injectable 50 milliLiter(s) IV Push every 15 minutes  dextrose 50% Injectable 25 milliLiter(s) IV Push every 15 minutes  DOBUTamine Infusion 3 MICROgram(s)/kG/Min (9.19 mL/Hr) IV Continuous <Continuous>  famotidine    Tablet 20 milliGRAM(s) Oral two times a day  furosemide   Injectable 40 milliGRAM(s) IV Push daily  glucagon  Injectable 1 milliGRAM(s) IntraMuscular once  heparin   Injectable 3000 Unit(s) SubCutaneous every 12 hours  insulin regular Infusion 1 Unit(s)/Hr (1 mL/Hr) IV Continuous <Continuous>  ipratropium 17 MICROgram(s) HFA Inhaler 2 Puff(s) Inhalation every 6 hours  meperidine     Injectable 25 milliGRAM(s) IV Push once  metoprolol tartrate 12.5 milliGRAM(s) Oral every 12 hours  nitroglycerin  Infusion 5 MICROgram(s)/Min (1.5 mL/Hr) IV Continuous <Continuous>  polyethylene glycol 3350 17 Gram(s) Oral daily  potassium chloride    Tablet ER 20 milliEquivalent(s) Oral daily  potassium chloride    Tablet ER 20 milliEquivalent(s) Oral every 2 hours  senna 2 Tablet(s) Oral at bedtime  sodium chloride 0.9%. 1000 milliLiter(s) (10 mL/Hr) IV Continuous <Continuous>    MEDICATIONS  (PRN):  dextrose Oral Gel 15 Gram(s) Oral once PRN Blood Glucose LESS THAN 70 milliGRAM(s)/deciliter  oxyCODONE    IR 5 milliGRAM(s) Oral every 4 hours PRN Moderate Pain (4 - 6)  oxyCODONE    IR 10 milliGRAM(s) Oral every 4 hours PRN Severe Pain (7 - 10)    Allergies    No Known Allergies    Intolerances      Ambulation/Activity Status: ambulate as tolerated    Assessment/Plan:  62y Female status-post CABG day 3  - Case and plan discussed with CTU Intensivist and CT Surgeon - Dr. Moore/Devan/Dot  - Continue CTU supportive care    - Continue DVT prophylaxis  - Continue GI prophylaxis  - Incentive Spirometry 10 times an hour  - Continue to advance physical activity as tolerated and continue PT/OT as directed  - CAD: Continue ASA, statin, no BB due to soft blood pressure  - Anemia secondary to:  Acute PO blood loss anemia,              track and trend H/H   stable  - fluid overload secondary too:  acute non cardiac fluid over load   continue diuretic  - COPD/Hypoxia: PO hypoxia - incentive spirometer   - DM/Glucose Control: A1c 6.8 -  Continue Insulin drip    Social Service Disposition:  discharge later this week   OPERATIVE PROCEDURE(s):                POD #   3 CABG                    62yFemale  SURGEON(s): DEYVI Chris  SUBJECTIVE ASSESSMENT: c/o incisional pain, responds to pain meds    Vital Signs Last 24 Hrs  T(F): 100.6 (26 Mar 2023 06:00), Max: 101.8 (25 Mar 2023 18:00)  HR: 108 (26 Mar 2023 07:00) (94 - 116)  BP: 134/76 (26 Mar 2023 07:00) (96/57 - 134/76)  BP(mean): 95 (26 Mar 2023 07:00) (73 - 97)  ABP: 140/68 (26 Mar 2023 07:00) (91/40 - 161/67)  ABP(mean): 91 (26 Mar 2023 07:00)  RR: 27 (26 Mar 2023 07:00) (12 - 35)  SpO2: 94% (26 Mar 2023 07:00) (84% - 99%)    I&O's Detail    25 Mar 2023 07:01  -  26 Mar 2023 07:00  --------------------------------------------------------  IN:    Oral Fluid: 250 mL    sodium chloride 0.9%: 20 mL  Total IN: 270 mL    OUT:    Chest Tube (mL): 80 mL    Indwelling Catheter - Urethral (mL): 4076 mL  Total OUT: 4156 mL    Net:   I&O's Detail    24 Mar 2023 07:01  -  25 Mar 2023 07:00  --------------------------------------------------------  Total NET: 955.8 mL      25 Mar 2023 07:01  -  26 Mar 2023 07:00  --------------------------------------------------------  Total NET: -3886 mL    CAPILLARY BLOOD GLUCOSE      Physical Exam:  General: NAD; A&Ox3  Cardiac: S1/S2, RRR, no murmur, no rubs  Lungs: unlabored shallow respirations, bilateral bs decreased at bases - sono'ed not enough fluid to tap  Abdomen: Soft/NT/protuberant  Sternum: Intact, no click, incision healing well with no drainage  Incisions: Incisions clean/dry/intact  Extremities: No edema b/l lower extremities    Central Venous Catheter: Yes[]  critical patient   Kovacs Catheter: Yes  [] , critical patient strict I&O  EPICARDIAL WIRES:  [] YES  BOWEL MOVEMENT:  NO,       LABS:                        9.0<L>  8.36  )-----------( 128<L>    ( 26 Mar 2023 03:00 )             27.1<L>                        9.8<L>  9.84  )-----------( 160      ( 25 Mar 2023 20:41 )             29.7<L>    03-26    137  |  103  |  8<L>  ----------------------------<  100<H>  4.0   |  24  |  0.6<L>  03-25    140  |  100  |  7<L>  ----------------------------<  96  4.2   |  23  |  0.6<L>    Ca    8.8      26 Mar 2023 03:00  Mg     1.8     03-26    TPro  6.0 [6.0 - 8.0]  /  Alb  3.6 [3.5 - 5.2]  /  TBili  0.5 [0.2 - 1.2]  /  DBili  x   /  AST  30 [0 - 41]  /  ALT  13 [0 - 41]  /  AlkPhos  67 [30 - 115]  03-24    ABG - ( 26 Mar 2023 02:38 )  pH: 7.45  /  pCO2: 37    /  pO2: 69    / HCO3: 26    / Base Excess: 1.7   /  SaO2: 96.1  /  LA: 0.60     RADIOLOGY & ADDITIONAL TESTS:  CXR: < from: Xray Chest 1 View- PORTABLE-Routine (03.26.23 @ 06:15) >  Left apical pneumothorax with air gap measuring 1.2 cm.    Increasing bilateral opacities/effusions.    < end of copied text >  < from: 12 Lead ECG (03.26.23 @ 08:14) >  Ventricular Rate 100 BPM    Atrial Rate 100 BPM    P-R Interval 138 ms    QRS Duration 98 ms    Q-T Interval 386 ms    QTC Calculation(Bazett) 497 ms    P Axis 31 degrees    R Axis 10 degrees    T Axis 104 degrees    Diagnosis Line Normal sinus rhythm  ST & T wave abnormality, consider anterolateral ischemia  Prolonged QT  Abnormal ECG    < end of copied text >    EKG:  MEDICATIONS  (STANDING):  acetaminophen   IVPB .. 1000 milliGRAM(s) IV Intermittent once  albuterol    90 MICROgram(s) HFA Inhaler 2 Puff(s) Inhalation every 6 hours  aMIOdarone Infusion 1 mG/Min (33.3 mL/Hr) IV Continuous <Continuous>  aspirin enteric coated 325 milliGRAM(s) Oral daily  atorvastatin 40 milliGRAM(s) Oral at bedtime  cefTRIAXone   IVPB      cefTRIAXone   IVPB 1000 milliGRAM(s) IV Intermittent every 24 hours  chlorhexidine 2% Cloths 1 Application(s) Topical daily  dextrose 5%. 1000 milliLiter(s) (50 mL/Hr) IV Continuous <Continuous>  dextrose 5%. 1000 milliLiter(s) (100 mL/Hr) IV Continuous <Continuous>  dextrose 50% Injectable 25 Gram(s) IV Push once  dextrose 50% Injectable 12.5 Gram(s) IV Push once  dextrose 50% Injectable 25 Gram(s) IV Push once  dextrose 50% Injectable 50 milliLiter(s) IV Push every 15 minutes  dextrose 50% Injectable 25 milliLiter(s) IV Push every 15 minutes  DOBUTamine Infusion 3 MICROgram(s)/kG/Min (9.19 mL/Hr) IV Continuous <Continuous>  famotidine    Tablet 20 milliGRAM(s) Oral two times a day  furosemide   Injectable 40 milliGRAM(s) IV Push daily  glucagon  Injectable 1 milliGRAM(s) IntraMuscular once  heparin   Injectable 3000 Unit(s) SubCutaneous every 12 hours  insulin regular Infusion 1 Unit(s)/Hr (1 mL/Hr) IV Continuous <Continuous>  ipratropium 17 MICROgram(s) HFA Inhaler 2 Puff(s) Inhalation every 6 hours  meperidine     Injectable 25 milliGRAM(s) IV Push once  metoprolol tartrate 12.5 milliGRAM(s) Oral every 12 hours  nitroglycerin  Infusion 5 MICROgram(s)/Min (1.5 mL/Hr) IV Continuous <Continuous>  polyethylene glycol 3350 17 Gram(s) Oral daily  potassium chloride    Tablet ER 20 milliEquivalent(s) Oral daily  potassium chloride    Tablet ER 20 milliEquivalent(s) Oral every 2 hours  senna 2 Tablet(s) Oral at bedtime  sodium chloride 0.9%. 1000 milliLiter(s) (10 mL/Hr) IV Continuous <Continuous>    MEDICATIONS  (PRN):  dextrose Oral Gel 15 Gram(s) Oral once PRN Blood Glucose LESS THAN 70 milliGRAM(s)/deciliter  oxyCODONE    IR 5 milliGRAM(s) Oral every 4 hours PRN Moderate Pain (4 - 6)  oxyCODONE    IR 10 milliGRAM(s) Oral every 4 hours PRN Severe Pain (7 - 10)    Allergies    No Known Allergies    Intolerances      Ambulation/Activity Status: ambulate as tolerated    Assessment/Plan:  62y Female status-post CABG day 3  - Case and plan discussed with CTU Intensivist and CT Surgeon - Dr. Moore/Devan/Dot  - Continue CTU supportive care    - Continue DVT prophylaxis  - Continue GI prophylaxis  - Incentive Spirometry 10 times an hour  - Continue to advance physical activity as tolerated and continue PT/OT as directed  - CAD: Continue ASA, statin, no BB due to soft blood pressure  - Anemia secondary to:  Acute PO blood loss anemia,              track and trend H/H   stable  - fluid overload secondary too:  acute non cardiac fluid over load   continue diuretic  - COPD/Hypoxia: PO hypoxia - incentive spirometer and NC   - DM/Glucose Control: A1c 6.8 -  disontinue Insulin drip    Social Service Disposition:  discharge later this week

## 2023-03-27 LAB
ANION GAP SERPL CALC-SCNC: 11 MMOL/L — SIGNIFICANT CHANGE UP (ref 7–14)
ANION GAP SERPL CALC-SCNC: 9 MMOL/L — SIGNIFICANT CHANGE UP (ref 7–14)
BASOPHILS # BLD AUTO: 0.02 K/UL — SIGNIFICANT CHANGE UP (ref 0–0.2)
BASOPHILS NFR BLD AUTO: 0.2 % — SIGNIFICANT CHANGE UP (ref 0–1)
BUN SERPL-MCNC: 11 MG/DL — SIGNIFICANT CHANGE UP (ref 10–20)
BUN SERPL-MCNC: 13 MG/DL — SIGNIFICANT CHANGE UP (ref 10–20)
CALCIUM SERPL-MCNC: 8.8 MG/DL — SIGNIFICANT CHANGE UP (ref 8.4–10.5)
CALCIUM SERPL-MCNC: 9.1 MG/DL — SIGNIFICANT CHANGE UP (ref 8.4–10.4)
CHLORIDE SERPL-SCNC: 100 MMOL/L — SIGNIFICANT CHANGE UP (ref 98–110)
CHLORIDE SERPL-SCNC: 93 MMOL/L — LOW (ref 98–110)
CO2 SERPL-SCNC: 27 MMOL/L — SIGNIFICANT CHANGE UP (ref 17–32)
CO2 SERPL-SCNC: 28 MMOL/L — SIGNIFICANT CHANGE UP (ref 17–32)
CREAT SERPL-MCNC: 0.7 MG/DL — SIGNIFICANT CHANGE UP (ref 0.7–1.5)
CREAT SERPL-MCNC: 0.7 MG/DL — SIGNIFICANT CHANGE UP (ref 0.7–1.5)
EGFR: 97 ML/MIN/1.73M2 — SIGNIFICANT CHANGE UP
EGFR: 97 ML/MIN/1.73M2 — SIGNIFICANT CHANGE UP
EOSINOPHIL # BLD AUTO: 0.12 K/UL — SIGNIFICANT CHANGE UP (ref 0–0.7)
EOSINOPHIL NFR BLD AUTO: 1.3 % — SIGNIFICANT CHANGE UP (ref 0–8)
GAS PNL BLDA: SIGNIFICANT CHANGE UP
GAS PNL BLDA: SIGNIFICANT CHANGE UP
GLUCOSE BLDC GLUCOMTR-MCNC: 117 MG/DL — HIGH (ref 70–99)
GLUCOSE BLDC GLUCOMTR-MCNC: 119 MG/DL — HIGH (ref 70–99)
GLUCOSE BLDC GLUCOMTR-MCNC: 126 MG/DL — HIGH (ref 70–99)
GLUCOSE BLDC GLUCOMTR-MCNC: 129 MG/DL — HIGH (ref 70–99)
GLUCOSE SERPL-MCNC: 112 MG/DL — HIGH (ref 70–99)
GLUCOSE SERPL-MCNC: 137 MG/DL — HIGH (ref 70–99)
HCT VFR BLD CALC: 28.2 % — LOW (ref 37–47)
HCT VFR BLD CALC: 29.1 % — LOW (ref 37–47)
HGB BLD-MCNC: 9.3 G/DL — LOW (ref 12–16)
HGB BLD-MCNC: 9.5 G/DL — LOW (ref 12–16)
IMM GRANULOCYTES NFR BLD AUTO: 0.4 % — HIGH (ref 0.1–0.3)
LYMPHOCYTES # BLD AUTO: 2.05 K/UL — SIGNIFICANT CHANGE UP (ref 1.2–3.4)
LYMPHOCYTES # BLD AUTO: 22.1 % — SIGNIFICANT CHANGE UP (ref 20.5–51.1)
MAGNESIUM SERPL-MCNC: 2 MG/DL — SIGNIFICANT CHANGE UP (ref 1.8–2.4)
MAGNESIUM SERPL-MCNC: 2.7 MG/DL — HIGH (ref 1.8–2.4)
MCHC RBC-ENTMCNC: 30.4 PG — SIGNIFICANT CHANGE UP (ref 27–31)
MCHC RBC-ENTMCNC: 30.5 PG — SIGNIFICANT CHANGE UP (ref 27–31)
MCHC RBC-ENTMCNC: 32.6 G/DL — SIGNIFICANT CHANGE UP (ref 32–37)
MCHC RBC-ENTMCNC: 33 G/DL — SIGNIFICANT CHANGE UP (ref 32–37)
MCV RBC AUTO: 92.2 FL — SIGNIFICANT CHANGE UP (ref 81–99)
MCV RBC AUTO: 93.6 FL — SIGNIFICANT CHANGE UP (ref 81–99)
MONOCYTES # BLD AUTO: 1.01 K/UL — HIGH (ref 0.1–0.6)
MONOCYTES NFR BLD AUTO: 10.9 % — HIGH (ref 1.7–9.3)
NEUTROPHILS # BLD AUTO: 6.03 K/UL — SIGNIFICANT CHANGE UP (ref 1.4–6.5)
NEUTROPHILS NFR BLD AUTO: 65.1 % — SIGNIFICANT CHANGE UP (ref 42.2–75.2)
NRBC # BLD: 0 /100 WBCS — SIGNIFICANT CHANGE UP (ref 0–0)
NRBC # BLD: 0 /100 WBCS — SIGNIFICANT CHANGE UP (ref 0–0)
PLATELET # BLD AUTO: 174 K/UL — SIGNIFICANT CHANGE UP (ref 130–400)
PLATELET # BLD AUTO: 210 K/UL — SIGNIFICANT CHANGE UP (ref 130–400)
POTASSIUM SERPL-MCNC: 4.2 MMOL/L — SIGNIFICANT CHANGE UP (ref 3.5–5)
POTASSIUM SERPL-MCNC: 4.4 MMOL/L — SIGNIFICANT CHANGE UP (ref 3.5–5)
POTASSIUM SERPL-SCNC: 4.2 MMOL/L — SIGNIFICANT CHANGE UP (ref 3.5–5)
POTASSIUM SERPL-SCNC: 4.4 MMOL/L — SIGNIFICANT CHANGE UP (ref 3.5–5)
RBC # BLD: 3.06 M/UL — LOW (ref 4.2–5.4)
RBC # BLD: 3.11 M/UL — LOW (ref 4.2–5.4)
RBC # FLD: 13.9 % — SIGNIFICANT CHANGE UP (ref 11.5–14.5)
RBC # FLD: 14 % — SIGNIFICANT CHANGE UP (ref 11.5–14.5)
SODIUM SERPL-SCNC: 132 MMOL/L — LOW (ref 135–146)
SODIUM SERPL-SCNC: 136 MMOL/L — SIGNIFICANT CHANGE UP (ref 135–146)
WBC # BLD: 8.9 K/UL — SIGNIFICANT CHANGE UP (ref 4.8–10.8)
WBC # BLD: 9.27 K/UL — SIGNIFICANT CHANGE UP (ref 4.8–10.8)
WBC # FLD AUTO: 8.9 K/UL — SIGNIFICANT CHANGE UP (ref 4.8–10.8)
WBC # FLD AUTO: 9.27 K/UL — SIGNIFICANT CHANGE UP (ref 4.8–10.8)

## 2023-03-27 PROCEDURE — 93010 ELECTROCARDIOGRAM REPORT: CPT

## 2023-03-27 PROCEDURE — 71046 X-RAY EXAM CHEST 2 VIEWS: CPT | Mod: 26

## 2023-03-27 PROCEDURE — 99233 SBSQ HOSP IP/OBS HIGH 50: CPT

## 2023-03-27 PROCEDURE — 71045 X-RAY EXAM CHEST 1 VIEW: CPT | Mod: 26

## 2023-03-27 RX ORDER — POTASSIUM CHLORIDE 20 MEQ
20 PACKET (EA) ORAL ONCE
Refills: 0 | Status: COMPLETED | OUTPATIENT
Start: 2023-03-27 | End: 2023-03-27

## 2023-03-27 RX ORDER — MAGNESIUM SULFATE 500 MG/ML
1 VIAL (ML) INJECTION ONCE
Refills: 0 | Status: COMPLETED | OUTPATIENT
Start: 2023-03-27 | End: 2023-03-27

## 2023-03-27 RX ORDER — LACTULOSE 10 G/15ML
10 SOLUTION ORAL ONCE
Refills: 0 | Status: COMPLETED | OUTPATIENT
Start: 2023-03-27 | End: 2023-03-27

## 2023-03-27 RX ORDER — METOPROLOL TARTRATE 50 MG
12.5 TABLET ORAL ONCE
Refills: 0 | Status: COMPLETED | OUTPATIENT
Start: 2023-03-27 | End: 2023-03-27

## 2023-03-27 RX ORDER — METOPROLOL TARTRATE 50 MG
25 TABLET ORAL EVERY 12 HOURS
Refills: 0 | Status: DISCONTINUED | OUTPATIENT
Start: 2023-03-27 | End: 2023-03-29

## 2023-03-27 RX ADMIN — POLYETHYLENE GLYCOL 3350 17 GRAM(S): 17 POWDER, FOR SOLUTION ORAL at 11:17

## 2023-03-27 RX ADMIN — HEPARIN SODIUM 3000 UNIT(S): 5000 INJECTION INTRAVENOUS; SUBCUTANEOUS at 17:34

## 2023-03-27 RX ADMIN — OXYCODONE HYDROCHLORIDE 10 MILLIGRAM(S): 5 TABLET ORAL at 11:15

## 2023-03-27 RX ADMIN — Medication 40 MILLIGRAM(S): at 05:07

## 2023-03-27 RX ADMIN — OXYCODONE HYDROCHLORIDE 10 MILLIGRAM(S): 5 TABLET ORAL at 22:05

## 2023-03-27 RX ADMIN — HEPARIN SODIUM 3000 UNIT(S): 5000 INJECTION INTRAVENOUS; SUBCUTANEOUS at 05:07

## 2023-03-27 RX ADMIN — Medication 5 MILLIGRAM(S): at 05:07

## 2023-03-27 RX ADMIN — OXYCODONE HYDROCHLORIDE 10 MILLIGRAM(S): 5 TABLET ORAL at 05:06

## 2023-03-27 RX ADMIN — Medication 3 MILLILITER(S): at 20:17

## 2023-03-27 RX ADMIN — FAMOTIDINE 20 MILLIGRAM(S): 10 INJECTION INTRAVENOUS at 17:33

## 2023-03-27 RX ADMIN — Medication 12.5 MILLIGRAM(S): at 09:33

## 2023-03-27 RX ADMIN — Medication 20 MILLIEQUIVALENT(S): at 11:16

## 2023-03-27 RX ADMIN — FAMOTIDINE 20 MILLIGRAM(S): 10 INJECTION INTRAVENOUS at 05:07

## 2023-03-27 RX ADMIN — OXYCODONE HYDROCHLORIDE 10 MILLIGRAM(S): 5 TABLET ORAL at 05:50

## 2023-03-27 RX ADMIN — OXYCODONE HYDROCHLORIDE 10 MILLIGRAM(S): 5 TABLET ORAL at 11:45

## 2023-03-27 RX ADMIN — OXYCODONE HYDROCHLORIDE 10 MILLIGRAM(S): 5 TABLET ORAL at 17:14

## 2023-03-27 RX ADMIN — Medication 5 MILLIGRAM(S): at 17:33

## 2023-03-27 RX ADMIN — Medication 3 MILLILITER(S): at 14:00

## 2023-03-27 RX ADMIN — CEFTRIAXONE 100 MILLIGRAM(S): 500 INJECTION, POWDER, FOR SOLUTION INTRAMUSCULAR; INTRAVENOUS at 12:50

## 2023-03-27 RX ADMIN — Medication 12.5 MILLIGRAM(S): at 05:07

## 2023-03-27 RX ADMIN — Medication 20 MILLIEQUIVALENT(S): at 09:33

## 2023-03-27 RX ADMIN — OXYCODONE HYDROCHLORIDE 10 MILLIGRAM(S): 5 TABLET ORAL at 22:35

## 2023-03-27 RX ADMIN — Medication 325 MILLIGRAM(S): at 11:17

## 2023-03-27 RX ADMIN — SENNA PLUS 2 TABLET(S): 8.6 TABLET ORAL at 21:05

## 2023-03-27 RX ADMIN — CHLORHEXIDINE GLUCONATE 1 APPLICATION(S): 213 SOLUTION TOPICAL at 05:08

## 2023-03-27 RX ADMIN — OXYCODONE HYDROCHLORIDE 10 MILLIGRAM(S): 5 TABLET ORAL at 16:44

## 2023-03-27 RX ADMIN — Medication 100 GRAM(S): at 09:33

## 2023-03-27 RX ADMIN — Medication 25 MILLIGRAM(S): at 17:34

## 2023-03-27 RX ADMIN — LACTULOSE 10 GRAM(S): 10 SOLUTION ORAL at 12:57

## 2023-03-27 RX ADMIN — Medication 3 MILLILITER(S): at 02:00

## 2023-03-27 RX ADMIN — ATORVASTATIN CALCIUM 40 MILLIGRAM(S): 80 TABLET, FILM COATED ORAL at 21:05

## 2023-03-27 NOTE — PROGRESS NOTE ADULT - SUBJECTIVE AND OBJECTIVE BOX
OPERATIVE PROCEDURE(s):      CABGx4          POD #4                       62yFemale    SURGEON(s): DEYVI Chris    SUBJECTIVE ASSESSMENT:  Patient has no complaints at this time.      Vital Signs Last 24 Hrs  T(F): 98.9 (27 Mar 2023 08:00), Max: 100.2 (27 Mar 2023 04:00)  HR: 99 (27 Mar 2023 11:00) (91 - 101)  BP: 129/66 (27 Mar 2023 10:00) (103/64 - 129/66)  BP(mean): 90 (27 Mar 2023 10:00) (78 - 90)  ABP: 109/54 (27 Mar 2023 06:00) (91/88 - 140/64)  ABP(mean): 72 (27 Mar 2023 06:00)  RR: 24 (27 Mar 2023 11:00) (15 - 34)  SpO2: 94% (27 Mar 2023 11:00) (94% - 99%)      I&O's Detail    26 Mar 2023 07:01  -  27 Mar 2023 07:00  --------------------------------------------------------  IN:    Oral Fluid: 1200 mL  Total IN: 1200 mL    OUT:    Indwelling Catheter - Urethral (mL): 2955 mL  Total OUT: 2955 mL        Net:   I&O's Detail    25 Mar 2023 07:01  -  26 Mar 2023 07:00  --------------------------------------------------------  Total NET: -4151 mL      26 Mar 2023 07:01  -  27 Mar 2023 07:00  --------------------------------------------------------  Total NET: -1755 mL        CAPILLARY BLOOD GLUCOSE      POCT Blood Glucose.: 117 mg/dL (27 Mar 2023 11:32)  POCT Blood Glucose.: 129 mg/dL (27 Mar 2023 07:01)  POCT Blood Glucose.: 121 mg/dL (26 Mar 2023 21:23)  POCT Blood Glucose.: 113 mg/dL (26 Mar 2023 16:54)    Physical Exam:  General: NAD; A&Ox3  Cardiac: S1/S2, RRR, no murmur, no rubs  Lungs: CTA b/l, no wheeze, no rales, no crackles  Abdomen: Soft/NT/ND; positive bowel sounds   Sternum: Intact, no click, incision healing well with no drainage  Incisions: Incisions clean/dry/intact  Extremities: +DP's    Central Venous Catheter: Yes[x]  Critical illness, venous access          Day #4  Malin Catheter: Yes  [x] Strict I/O's                                                            Day #4  EPICARDIAL WIRES:  [x] YES [] NO                                                              Day #4  BOWEL MOVEMENT:  [] YES [x] NO, If No, Timing since last BM:        Day #        LABS:                        9.3<L>  9.27  )-----------( 174      ( 27 Mar 2023 01:30 )             28.2<L>                        9.7<L>  9.90  )-----------( 176      ( 26 Mar 2023 17:04 )             29.7<L>    0327    136  |  100  |  11  ----------------------------<  112<H>  4.2   |  27  |  0.7      135  |  98  |  10  ----------------------------<  109<H>  4.6   |  24  |  0.7    Ca    8.8      27 Mar 2023 01:30  Mg     2.0     03-27    TPro  6.0 [6.0 - 8.0]  /  Alb  3.6 [3.5 - 5.2]  /  TBili  0.5 [0.2 - 1.2]  /  DBili  x   /  AST  30 [0 - 41]  /  ALT  13 [0 - 41]  /  AlkPhos  67 [30 - 115]  03-24        ABG - ( 27 Mar 2023 09:56 )  pH: 7.53  /  pCO2: 35    /  pO2: 84    / HCO3: 29    / Base Excess: 6.2   /  SaO2: 98.9  /  LA: 1.00             RADIOLOGY & ADDITIONAL TESTS:  CXR: Xray Chest 1 View- PORTABLE-Routine:   ACC: [id number] EXAM:  XR CHEST PORTABLE ROUTINE 1V   ORDERED BY: WALTER ESTEVEZ     PROCEDURE DATE:  2023          INTERPRETATION:  Clinical History / Reason for exam: Shortness of breath.    Comparison : Chest radiograph dated 2023.    Technique/Positioning: Portable frontal.    Findings:    Support devices: Right IJ central venous catheter in stable position.   Overlying EKG leads.    Cardiac/mediastinum/hilum: Stable.    Lung parenchyma/Pleura: Unchanged bibasilar opacities/pleural effusions.   Unchanged left apical pneumothorax.    Skeleton/soft tissues: Stable.    Impression:    1. Unchanged left apical pneumothorax.    2. Unchanged bibasilar opacities/pleural effusions.    --- End of Report ---            ALISA CHANDLER MD; Attending Radiologist  This document has been electronically signed. Mar 27 2023  9:18AM (23 @ 05:51)    EK Lead ECG:   Ventricular Rate 93 BPM    Atrial Rate 93 BPM    P-R Interval 140 ms    QRS Duration 94 ms    Q-T Interval 388 ms    QTC Calculation(Bazett) 482 ms    P Axis 40 degrees    R Axis -3 degrees    T Axis 99 degrees    Diagnosis Line Normal sinus rhythm  Incomplete right bundle branch block  T wave abnormality, consider anterolateral ischemia  Prolonged QT  Abnormal ECG    Confirmed by SUKHDEV SAUL MD ([id number]) on 3/27/2023 9:12:26 AM (23 @ 07:32)    MEDICATIONS  (STANDING):  acetaminophen   IVPB .. 1000 milliGRAM(s) IV Intermittent once  albuterol    90 MICROgram(s) HFA Inhaler 2 Puff(s) Inhalation every 6 hours  albuterol/ipratropium for Nebulization 3 milliLiter(s) Nebulizer every 6 hours  aspirin enteric coated 325 milliGRAM(s) Oral daily  atorvastatin 40 milliGRAM(s) Oral at bedtime  bisacodyl 5 milliGRAM(s) Oral every 12 hours  cefTRIAXone   IVPB      cefTRIAXone   IVPB 1000 milliGRAM(s) IV Intermittent every 24 hours  chlorhexidine 2% Cloths 1 Application(s) Topical daily  dextrose 5%. 1000 milliLiter(s) (50 mL/Hr) IV Continuous <Continuous>  dextrose 5%. 1000 milliLiter(s) (100 mL/Hr) IV Continuous <Continuous>  dextrose 50% Injectable 50 milliLiter(s) IV Push every 15 minutes  dextrose 50% Injectable 25 Gram(s) IV Push once  dextrose 50% Injectable 12.5 Gram(s) IV Push once  dextrose 50% Injectable 25 Gram(s) IV Push once  dextrose 50% Injectable 25 milliLiter(s) IV Push every 15 minutes  famotidine    Tablet 20 milliGRAM(s) Oral two times a day  furosemide   Injectable 40 milliGRAM(s) IV Push daily  glucagon  Injectable 1 milliGRAM(s) IntraMuscular once  heparin   Injectable 3000 Unit(s) SubCutaneous every 12 hours  insulin lispro (ADMELOG) corrective regimen sliding scale   SubCutaneous three times a day before meals  ipratropium 17 MICROgram(s) HFA Inhaler 2 Puff(s) Inhalation every 6 hours  metoprolol tartrate 25 milliGRAM(s) Oral every 12 hours  polyethylene glycol 3350 17 Gram(s) Oral daily  potassium chloride    Tablet ER 20 milliEquivalent(s) Oral daily  senna 2 Tablet(s) Oral at bedtime  sodium chloride 0.9%. 1000 milliLiter(s) (10 mL/Hr) IV Continuous <Continuous>    MEDICATIONS  (PRN):  dextrose Oral Gel 15 Gram(s) Oral once PRN Blood Glucose LESS THAN 70 milliGRAM(s)/deciliter  oxyCODONE    IR 5 milliGRAM(s) Oral every 4 hours PRN Moderate Pain (4 - 6)  oxyCODONE    IR 10 milliGRAM(s) Oral every 4 hours PRN Severe Pain (7 - 10)    HEPARIN:  [x] YES [] NO  Dose: 3000 UNITS Q12H  SCD's: YES b/l  GI Prophylaxis: Pepcid [x]    Post-Op Aspirin: Yes [x]  Post-Op Statin: Yes [x]  Post-Op Beta-Blockers: Yes [x]    Allergies:  No Known Allergies      Ambulation/Activity Status: Ambulates several times daily with assistance.    Assessment/Plan:    62y Female status-post CABGx4 POD 4    -Pain management with tylenol and oxy prn  -Monitor HD's and maintain MAP 65-75  -Continue ASA/Statin/lovenox/BB  -Hypoxia, wean FiO2 as tolerated by O2 saturation > 92%  -Encourage IS. Chest PT, Nebs prn  -CXR in am  -OOB to chair. Ambulate with PT as tolerated  -PO diet. GI PPx with pepcid  -Continue ISS coverage. Monitor FS  -Monitor UOP/Lytes. Replete for hypokalemia/hypomagnesemia  -Continue malin  -Monitor wbc/temp  -DVT ppx  -Continue CTU supportive care    -Continue to advance physical activity as tolerated and continue PT/OT as directed  -Case and plan discussed with CTU Intensivist and CT Surgeon - Dr. Moore/Devan/Dot

## 2023-03-27 NOTE — PROGRESS NOTE ADULT - SUBJECTIVE AND OBJECTIVE BOX
JENNIFER TORRES  MRN#: [id number]  Subjective:  Patient was seen and evalauted on AM rounds offerring no specific compliants at this time.    OBJECTIVE:  ICU Vital Signs Last 24 Hrs  T(C): 37.2 (27 Mar 2023 08:00), Max: 37.9 (27 Mar 2023 04:00)  T(F): 98.9 (27 Mar 2023 08:00), Max: 100.2 (27 Mar 2023 04:00)  HR: 92 (27 Mar 2023 10:00) (91 - 102)  BP: 129/66 (27 Mar 2023 10:00) (101/68 - 129/66)  BP(mean): 90 (27 Mar 2023 10:00) (78 - 90)  ABP: 109/54 (27 Mar 2023 06:00) (91/88 - 142/65)  ABP(mean): 72 (27 Mar 2023 06:00) (72 - 87)  RR: 21 (27 Mar 2023 10:00) (15 - 34)  SpO2: 94% (27 Mar 2023 10:00) (94% - 99%)    O2 Parameters below as of 27 Mar 2023 10:00  Patient On (Oxygen Delivery Method): nasal cannula w/ humidification  O2 Flow (L/min): 3           @ 07:  -   @ 07:00  --------------------------------------------------------  IN: 1200 mL / OUT: 2955 mL / NET: -1755 mL     @ 07:  -   @ 10:28  --------------------------------------------------------  IN: 340 mL / OUT: 185 mL / NET: 155 mL      CAPILLARY BLOOD GLUCOSE      POCT Blood Glucose.: 129 mg/dL (27 Mar 2023 07:01)      PHYSICAL EXAM:Daily     Daily Weight in k.1 (27 Mar 2023 06:00)  General: WN/WD NAD    HEENT:     + NCAT  + EOMI  - Conjuctival edema   - Icterus   - Thrush   - ETT  - NGT/OGT    Neck:         + FROM    - JVD     - Nodes     - Masses    + Mid-line trachea   - Tracheostomy    Chest:         - Sternal click  - Sternal drainage  + Pacing wires    - SubQ emphysema    Lungs:          + CTA   - Rhonchi    - Rales    - Wheezing     - Decreased BS   - Dullness R L    Cardiac:       + S1 + S2    + RRR   - Irregular   - S3  - S4    - Murmurs   - Rub   - Hamman’s sign     Abdomen:    + BS     + Soft    + Non-tender     - Distended    - Organomegaly  - PEG    Extremities:   - Cyanosis U/L   - Clubbing  U/L  - LE/UE Edema   + Capillary refill    + Pulses     Neuro:        + Awake   +  Alert   - Confused   - Lethargic   - Sedated   - Generalized Weakness    Skin:        - Rashes    - Erythema   + Normal incisions   + IV sites intact  - Sacral decubitus    HOSPITAL MEDICATIONS:  MEDICATIONS  (STANDING):  acetaminophen   IVPB .. 1000 milliGRAM(s) IV Intermittent once  albuterol    90 MICROgram(s) HFA Inhaler 2 Puff(s) Inhalation every 6 hours  albuterol/ipratropium for Nebulization 3 milliLiter(s) Nebulizer every 6 hours  aspirin enteric coated 325 milliGRAM(s) Oral daily  atorvastatin 40 milliGRAM(s) Oral at bedtime  bisacodyl 5 milliGRAM(s) Oral every 12 hours  cefTRIAXone   IVPB      cefTRIAXone   IVPB 1000 milliGRAM(s) IV Intermittent every 24 hours  chlorhexidine 2% Cloths 1 Application(s) Topical daily  dextrose 5%. 1000 milliLiter(s) (50 mL/Hr) IV Continuous <Continuous>  dextrose 5%. 1000 milliLiter(s) (100 mL/Hr) IV Continuous <Continuous>  dextrose 50% Injectable 25 Gram(s) IV Push once  dextrose 50% Injectable 12.5 Gram(s) IV Push once  dextrose 50% Injectable 25 Gram(s) IV Push once  dextrose 50% Injectable 50 milliLiter(s) IV Push every 15 minutes  dextrose 50% Injectable 25 milliLiter(s) IV Push every 15 minutes  famotidine    Tablet 20 milliGRAM(s) Oral two times a day  furosemide   Injectable 40 milliGRAM(s) IV Push daily  glucagon  Injectable 1 milliGRAM(s) IntraMuscular once  heparin   Injectable 3000 Unit(s) SubCutaneous every 12 hours  insulin lispro (ADMELOG) corrective regimen sliding scale   SubCutaneous three times a day before meals  ipratropium 17 MICROgram(s) HFA Inhaler 2 Puff(s) Inhalation every 6 hours  metoprolol tartrate 25 milliGRAM(s) Oral every 12 hours  polyethylene glycol 3350 17 Gram(s) Oral daily  potassium chloride    Tablet ER 20 milliEquivalent(s) Oral daily  senna 2 Tablet(s) Oral at bedtime  sodium chloride 0.9%. 1000 milliLiter(s) (10 mL/Hr) IV Continuous <Continuous>    MEDICATIONS  (PRN):  dextrose Oral Gel 15 Gram(s) Oral once PRN Blood Glucose LESS THAN 70 milliGRAM(s)/deciliter  oxyCODONE    IR 5 milliGRAM(s) Oral every 4 hours PRN Moderate Pain (4 - 6)  oxyCODONE    IR 10 milliGRAM(s) Oral every 4 hours PRN Severe Pain (7 - 10)      LABS:                        9.3    9.27  )-----------( 174      ( 27 Mar 2023 01:30 )             28.2        136  |  100  |  11  ----------------------------<  112<H>  4.2   |  27  |  0.7    Ca    8.8      27 Mar 2023 01:30  Mg     2.0                  RADIOLOGY:  X Reviewed and interpreted by me: bilateral opacities/effusions    CARDIOPULMONARY DYSFUNCTION  - Respiratory status required supplemental oxygen & the following of continuous pulse oximetry for support & to prevent decompensation  - Continued early mobilization as tolerated  - Addressed analgesic regimen to optimize function    PREVENTION-PROPHYLAXIS  - ASA continued for graft occlusion-thromboembolism prophylaxis  - Lipitor was also started for long term graft patency  - Heparin continued for VTE prophylaxis in addition to Venodyne boots  - Pepcid maintained for GI bleeding prophylaxis  - Lopressor continued for atrial fibrillation prophylaxis  - Metabolic stability & infection prophylaxis required review and adjustment of regular Insulin sliding scale and gylcemic regimen while following serial glucose levels to help achieve & maintain euglycemia  - Reviewed & addressed surgical site infection prophylaxis regimen

## 2023-03-28 ENCOUNTER — TRANSCRIPTION ENCOUNTER (OUTPATIENT)
Age: 63
End: 2023-03-28

## 2023-03-28 LAB
ANION GAP SERPL CALC-SCNC: 10 MMOL/L — SIGNIFICANT CHANGE UP (ref 7–14)
BASOPHILS # BLD AUTO: 0.02 K/UL — SIGNIFICANT CHANGE UP (ref 0–0.2)
BASOPHILS NFR BLD AUTO: 0.2 % — SIGNIFICANT CHANGE UP (ref 0–1)
BUN SERPL-MCNC: 15 MG/DL — SIGNIFICANT CHANGE UP (ref 10–20)
CALCIUM SERPL-MCNC: 8.9 MG/DL — SIGNIFICANT CHANGE UP (ref 8.4–10.5)
CHLORIDE SERPL-SCNC: 96 MMOL/L — LOW (ref 98–110)
CO2 SERPL-SCNC: 27 MMOL/L — SIGNIFICANT CHANGE UP (ref 17–32)
CREAT SERPL-MCNC: 0.7 MG/DL — SIGNIFICANT CHANGE UP (ref 0.7–1.5)
CULTURE RESULTS: SIGNIFICANT CHANGE UP
EGFR: 97 ML/MIN/1.73M2 — SIGNIFICANT CHANGE UP
EOSINOPHIL # BLD AUTO: 0.2 K/UL — SIGNIFICANT CHANGE UP (ref 0–0.7)
EOSINOPHIL NFR BLD AUTO: 2.4 % — SIGNIFICANT CHANGE UP (ref 0–8)
GLUCOSE BLDC GLUCOMTR-MCNC: 121 MG/DL — HIGH (ref 70–99)
GLUCOSE SERPL-MCNC: 100 MG/DL — HIGH (ref 70–99)
HCT VFR BLD CALC: 27.4 % — LOW (ref 37–47)
HGB BLD-MCNC: 9.1 G/DL — LOW (ref 12–16)
IMM GRANULOCYTES NFR BLD AUTO: 0.5 % — HIGH (ref 0.1–0.3)
LYMPHOCYTES # BLD AUTO: 1.9 K/UL — SIGNIFICANT CHANGE UP (ref 1.2–3.4)
LYMPHOCYTES # BLD AUTO: 23.1 % — SIGNIFICANT CHANGE UP (ref 20.5–51.1)
MAGNESIUM SERPL-MCNC: 2.4 MG/DL — SIGNIFICANT CHANGE UP (ref 1.8–2.4)
MCHC RBC-ENTMCNC: 31.1 PG — HIGH (ref 27–31)
MCHC RBC-ENTMCNC: 33.2 G/DL — SIGNIFICANT CHANGE UP (ref 32–37)
MCV RBC AUTO: 93.5 FL — SIGNIFICANT CHANGE UP (ref 81–99)
MONOCYTES # BLD AUTO: 1.04 K/UL — HIGH (ref 0.1–0.6)
MONOCYTES NFR BLD AUTO: 12.7 % — HIGH (ref 1.7–9.3)
NEUTROPHILS # BLD AUTO: 5.01 K/UL — SIGNIFICANT CHANGE UP (ref 1.4–6.5)
NEUTROPHILS NFR BLD AUTO: 61.1 % — SIGNIFICANT CHANGE UP (ref 42.2–75.2)
NRBC # BLD: 0 /100 WBCS — SIGNIFICANT CHANGE UP (ref 0–0)
PLATELET # BLD AUTO: 205 K/UL — SIGNIFICANT CHANGE UP (ref 130–400)
POTASSIUM SERPL-MCNC: 4.4 MMOL/L — SIGNIFICANT CHANGE UP (ref 3.5–5)
POTASSIUM SERPL-SCNC: 4.4 MMOL/L — SIGNIFICANT CHANGE UP (ref 3.5–5)
RBC # BLD: 2.93 M/UL — LOW (ref 4.2–5.4)
RBC # FLD: 13.7 % — SIGNIFICANT CHANGE UP (ref 11.5–14.5)
SODIUM SERPL-SCNC: 133 MMOL/L — LOW (ref 135–146)
SPECIMEN SOURCE: SIGNIFICANT CHANGE UP
WBC # BLD: 8.21 K/UL — SIGNIFICANT CHANGE UP (ref 4.8–10.8)
WBC # FLD AUTO: 8.21 K/UL — SIGNIFICANT CHANGE UP (ref 4.8–10.8)

## 2023-03-28 PROCEDURE — 99232 SBSQ HOSP IP/OBS MODERATE 35: CPT

## 2023-03-28 PROCEDURE — 71045 X-RAY EXAM CHEST 1 VIEW: CPT | Mod: 26

## 2023-03-28 PROCEDURE — 93010 ELECTROCARDIOGRAM REPORT: CPT

## 2023-03-28 RX ORDER — POTASSIUM CHLORIDE 20 MEQ
20 PACKET (EA) ORAL ONCE
Refills: 0 | Status: COMPLETED | OUTPATIENT
Start: 2023-03-28 | End: 2023-03-28

## 2023-03-28 RX ORDER — FERROUS SULFATE 325(65) MG
325 TABLET ORAL DAILY
Refills: 0 | Status: DISCONTINUED | OUTPATIENT
Start: 2023-03-28 | End: 2023-03-29

## 2023-03-28 RX ORDER — FUROSEMIDE 40 MG
40 TABLET ORAL EVERY 12 HOURS
Refills: 0 | Status: DISCONTINUED | OUTPATIENT
Start: 2023-03-28 | End: 2023-03-29

## 2023-03-28 RX ORDER — FOLIC ACID 0.8 MG
1 TABLET ORAL DAILY
Refills: 0 | Status: DISCONTINUED | OUTPATIENT
Start: 2023-03-28 | End: 2023-03-29

## 2023-03-28 RX ORDER — MULTIVIT-MIN/FERROUS GLUCONATE 9 MG/15 ML
1 LIQUID (ML) ORAL DAILY
Refills: 0 | Status: DISCONTINUED | OUTPATIENT
Start: 2023-03-28 | End: 2023-03-29

## 2023-03-28 RX ADMIN — FAMOTIDINE 20 MILLIGRAM(S): 10 INJECTION INTRAVENOUS at 17:16

## 2023-03-28 RX ADMIN — Medication 1 TABLET(S): at 11:18

## 2023-03-28 RX ADMIN — FAMOTIDINE 20 MILLIGRAM(S): 10 INJECTION INTRAVENOUS at 05:35

## 2023-03-28 RX ADMIN — Medication 40 MILLIGRAM(S): at 05:35

## 2023-03-28 RX ADMIN — Medication 25 MILLIGRAM(S): at 05:35

## 2023-03-28 RX ADMIN — Medication 5 MILLIGRAM(S): at 05:35

## 2023-03-28 RX ADMIN — Medication 25 MILLIGRAM(S): at 17:16

## 2023-03-28 RX ADMIN — OXYCODONE HYDROCHLORIDE 5 MILLIGRAM(S): 5 TABLET ORAL at 21:00

## 2023-03-28 RX ADMIN — CHLORHEXIDINE GLUCONATE 1 APPLICATION(S): 213 SOLUTION TOPICAL at 05:35

## 2023-03-28 RX ADMIN — POLYETHYLENE GLYCOL 3350 17 GRAM(S): 17 POWDER, FOR SOLUTION ORAL at 14:57

## 2023-03-28 RX ADMIN — Medication 40 MILLIGRAM(S): at 17:17

## 2023-03-28 RX ADMIN — OXYCODONE HYDROCHLORIDE 10 MILLIGRAM(S): 5 TABLET ORAL at 05:34

## 2023-03-28 RX ADMIN — Medication 20 MILLIEQUIVALENT(S): at 11:18

## 2023-03-28 RX ADMIN — ATORVASTATIN CALCIUM 40 MILLIGRAM(S): 80 TABLET, FILM COATED ORAL at 21:06

## 2023-03-28 RX ADMIN — OXYCODONE HYDROCHLORIDE 5 MILLIGRAM(S): 5 TABLET ORAL at 21:30

## 2023-03-28 RX ADMIN — Medication 20 MILLIEQUIVALENT(S): at 10:17

## 2023-03-28 RX ADMIN — Medication 5 MILLIGRAM(S): at 17:16

## 2023-03-28 RX ADMIN — Medication 1 MILLIGRAM(S): at 11:18

## 2023-03-28 RX ADMIN — CEFTRIAXONE 100 MILLIGRAM(S): 500 INJECTION, POWDER, FOR SOLUTION INTRAMUSCULAR; INTRAVENOUS at 13:17

## 2023-03-28 RX ADMIN — OXYCODONE HYDROCHLORIDE 5 MILLIGRAM(S): 5 TABLET ORAL at 14:54

## 2023-03-28 RX ADMIN — HEPARIN SODIUM 3000 UNIT(S): 5000 INJECTION INTRAVENOUS; SUBCUTANEOUS at 17:15

## 2023-03-28 RX ADMIN — Medication 325 MILLIGRAM(S): at 11:18

## 2023-03-28 RX ADMIN — OXYCODONE HYDROCHLORIDE 5 MILLIGRAM(S): 5 TABLET ORAL at 15:30

## 2023-03-28 RX ADMIN — Medication 3 MILLILITER(S): at 02:28

## 2023-03-28 NOTE — DISCHARGE NOTE NURSING/CASE MANAGEMENT/SOCIAL WORK - PATIENT PORTAL LINK FT
You can access the FollowMyHealth Patient Portal offered by [facility] by registering at the following website: [email or web address]. By joining [facility]’s FollowMyHealth portal, you will also be able to view your health information using other applications (apps) compatible with our system.

## 2023-03-28 NOTE — PROGRESS NOTE ADULT - SUBJECTIVE AND OBJECTIVE BOX
CTU Attending Progress Daily Note     28 Mar 2023 11:39  POD# -   He has history of Hypertension    High cholesterol      Interval event for past 24 hr:  JENNIFER TORRES  62y had no event.   Current Complains:  JENNIFER TORRES has no new complains  HPI:  63 y/o female with PMHx of HTN, HLD, COPD not on home O2, pre-DM, active smoker presented to the ED for chest tightness since 9am yesterday. She reports after breakfast she sat on the couch watching tv, when she suddenly developed substernal chest tightness with SOB and flushing. She got up to get her inhalers when she became dizzy prompting her ED visit yesterday. She described the chest tightness as non-radiating, substernal, improves on ambulation, worse on rest. She denies any similar episodes in the past. Denies any fevers, chills, abdominal pain, nausea, vomiting, diarrhea, constipation, LE swelling. Never had stress test before, no cardiac cath done. FH+ father  from MI at age 60.    In the ED, VS noted for T 36.4, HR 70, /77, RR 17, SpO2 98%. Labs noted for WBC 5, Hg 12, plt 187, K 4.3, Cr 0.9, Glu 96. Trop <0.01 x2. EKG showed sinus with PVCS and nonspecific T wave abnormality. Placed in ED-obs for CCTA. CCTA showed high calcium score. Admitted for nuclear stress test. Currently symptom free.  (18 Mar 2023 14:58)    OBJECTIVE:  ICU Vital Signs Last 24 Hrs  T(C): 37.3 (28 Mar 2023 07:45), Max: 37.3 (28 Mar 2023 07:45)  T(F): 99.2 (28 Mar 2023 07:45), Max: 99.2 (28 Mar 2023 07:45)  HR: 92 (28 Mar 2023 10:00) (80 - 108)  BP: 114/57 (28 Mar 2023 10:00) (103/63 - 142/66)  BP(mean): 79 (28 Mar 2023 10:00) (74 - 98)  ABP: --  ABP(mean): --  RR: 21 (28 Mar 2023 10:00) (13 - 46)  SpO2: 96% (28 Mar 2023 10:00) (90% - 99%)    O2 Parameters below as of 28 Mar 2023 10:00  Patient On (Oxygen Delivery Method): nasal cannula  O2 Flow (L/min): 3        I&O's Summary    27 Mar 2023 07:01  -  28 Mar 2023 07:00  --------------------------------------------------------  IN: 1070 mL / OUT: 1490 mL / NET: -420 mL      I&O's Detail    27 Mar 2023 07:01  -  28 Mar 2023 07:00  --------------------------------------------------------  IN:    IV PiggyBack: 150 mL    Oral Fluid: 920 mL  Total IN: 1070 mL    OUT:    Indwelling Catheter - Urethral (mL): 340 mL    Voided (mL): 1150 mL  Total OUT: 1490 mL    Total NET: -420 mL        Adult Advanced Hemodynamics Last 24 Hrs  CVP(mm Hg): --  CVP(cm H2O): --  CO: --  CI: --  PA: --  PA(mean): --  PCWP: --  SVR: --  SVRI: --  PVR: --  PVRI: --    CAPILLARY BLOOD GLUCOSE      POCT Blood Glucose.: 121 mg/dL (28 Mar 2023 11:25)  POCT Blood Glucose.: 119 mg/dL (27 Mar 2023 21:37)  POCT Blood Glucose.: 126 mg/dL (27 Mar 2023 16:21)    LABS:  ABG - ( 27 Mar 2023 09:56 )  pH, Arterial: 7.53  pH, Blood: x     /  pCO2: 35    /  pO2: 84    / HCO3: 29    / Base Excess: 6.2   /  SaO2: 98.9                                    9.1    8.21  )-----------( 205      ( 28 Mar 2023 02:43 )             27.4     03-28    133<L>  |  96<L>  |  15  ----------------------------<  100<H>  4.4   |  27  |  0.7    Ca    8.9      28 Mar 2023 02:43  Mg     2.4     -            Home Medications:  Albuterol (Eqv-Ventolin HFA) 90 mcg/inh inhalation aerosol: 2 puff(s) inhaled every 6 hours (18 Mar 2023 15:42)  amLODIPine 10 mg oral tablet: 1 tab(s) orally once a day (18 Mar 2023 15:42)  Aspir 81 oral delayed release tablet: 1 tab(s) orally once a day (18 Mar 2023 15:42)  atorvastatin 40 mg oral tablet: 1 tab(s) orally once a day (18 Mar 2023 15:42)  carvedilol 6.25 mg oral tablet: 1 tab(s) orally 2 times a day (18 Mar 2023 15:41)  Combivent Respimat 20 mcg-100 mcg/inh inhalation aerosol: 1 puff(s) inhaled 4 times a day (18 Mar 2023 15:41)    HOSPITAL MEDICATIONS:  MEDICATIONS  (STANDING):  acetaminophen   IVPB .. 1000 milliGRAM(s) IV Intermittent once  albuterol/ipratropium for Nebulization 3 milliLiter(s) Nebulizer every 6 hours  aspirin enteric coated 325 milliGRAM(s) Oral daily  atorvastatin 40 milliGRAM(s) Oral at bedtime  bisacodyl 5 milliGRAM(s) Oral every 12 hours  cefTRIAXone   IVPB      cefTRIAXone   IVPB 1000 milliGRAM(s) IV Intermittent every 24 hours  chlorhexidine 2% Cloths 1 Application(s) Topical daily  dextrose 5%. 1000 milliLiter(s) (50 mL/Hr) IV Continuous <Continuous>  dextrose 5%. 1000 milliLiter(s) (100 mL/Hr) IV Continuous <Continuous>  dextrose 50% Injectable 25 Gram(s) IV Push once  dextrose 50% Injectable 12.5 Gram(s) IV Push once  dextrose 50% Injectable 25 Gram(s) IV Push once  dextrose 50% Injectable 50 milliLiter(s) IV Push every 15 minutes  dextrose 50% Injectable 25 milliLiter(s) IV Push every 15 minutes  famotidine    Tablet 20 milliGRAM(s) Oral two times a day  ferrous    sulfate 325 milliGRAM(s) Oral daily  folic acid 1 milliGRAM(s) Oral daily  furosemide   Injectable 40 milliGRAM(s) IV Push every 12 hours  glucagon  Injectable 1 milliGRAM(s) IntraMuscular once  heparin   Injectable 3000 Unit(s) SubCutaneous every 12 hours  insulin lispro (ADMELOG) corrective regimen sliding scale   SubCutaneous three times a day before meals  metoprolol tartrate 25 milliGRAM(s) Oral every 12 hours  multivitamin/minerals 1 Tablet(s) Oral daily  polyethylene glycol 3350 17 Gram(s) Oral daily  potassium chloride    Tablet ER 20 milliEquivalent(s) Oral daily  senna 2 Tablet(s) Oral at bedtime    MEDICATIONS  (PRN):  dextrose Oral Gel 15 Gram(s) Oral once PRN Blood Glucose LESS THAN 70 milliGRAM(s)/deciliter  oxyCODONE    IR 5 milliGRAM(s) Oral every 4 hours PRN Moderate Pain (4 - 6)  oxyCODONE    IR 10 milliGRAM(s) Oral every 4 hours PRN Severe Pain (7 - 10)      REVIEW OF SYSTEMS:  CONSTITUTIONAL: [X] all negative; [ ] weakness, [ ] fevers, [ ] chills  EYES/ENT: [X] all negative; [ ] visual changes, [ ] vertigo, [ ] throat pain   NECK: [X] all negative; [ ] pain, [ ] stiffness  RESPIRATORY: [] all negative, [ ] cough, [ ] wheezing, [ ] hemoptysis, [ ] shortness of breath  CARDIOVASCULAR: [] all negative; [ ] chest pain, [ ] palpitations, [ ] orthopnea  GASTROINTESTINAL: [X] all negative; [ ]abdominal pain, [ ] nausea, [ ] vomiting, [ ] hematemesis, [ ] diarrhea, [ ] constipation, [ ] melena, [ ] hematochezia.  GENITOURINARY: [X] all negative; [ ] dysuria, [ ] frequency, [ ] hematuria  NEUROLOGICAL: [X] all negative; [ ] numbness, [ ] weakness  SKIN: [X] all negative; [ ] itching, [ ] burning, [ ] rashes, [ ] lesions   All other review of systems is negative unless indicated above.    [  ] Unable to assess ROS because     PHYSICAL EXAM:          CONSTITUTIONAL: Well-developed; well-nourished; in no acute distress.   	SKIN: warm, dry  	HEAD: Normocephalic; atraumatic.  	EYES: PERRL, EOM, no conj injection, sclera clear  	ENT: No nasal discharge; airway clear.  	NECK: Supple; non tender.  No midline ttp ctls  	CARD: S1, S2 normal; no murmurs, gallops, or rubs. Regular rate and rhythm. 2+ RPs and DPs bilat, no carotid bruits, no pedal   edema, no calf pain b/l  	RESP: CTA  bilat good air movement No wheezes, rales or rhonchi.  	ABD: Soft, not tender, not distended, no CVA ttp no rebound or guarding, bowel sounds present  	EXT: Normal ROM.  No clubbing, cyanosis or edema.   	  	NEURO: Alert, awake, motor 5/5 R, 5/5 L        RADIOLOGY:  xray  < from: Xray Chest 1 View AP/PA (23 @ 06:22) >  Impression:    1. Unchanged bilateral opacities/pleural effusions.    < end of copied text >    I spent 45 minutes of critical care time ; more than 50% of visit was spent counseling and/or examining patient, reviewing vitals, labs, medications, imaging and discussing with the team goals of care to prevent life-threatening in this patient who is at high risk for deterioration or death due to:

## 2023-03-28 NOTE — PROGRESS NOTE ADULT - SUBJECTIVE AND OBJECTIVE BOX
OPERATIVE PROCEDURE(s):          CABGx4          POD #5                     62yFemale    SURGEON(s): DEYVI Chris    SUBJECTIVE ASSESSMENT:  Patient has no complaints at this time.      Vital Signs Last 24 Hrs  T(F): 99.2 (28 Mar 2023 07:45), Max: 99.2 (28 Mar 2023 07:45)  HR: 93 (28 Mar 2023 08:00) (80 - 108)  BP: 122/70 (28 Mar 2023 08:00) (103/63 - 142/66)  BP(mean): 91 (28 Mar 2023 08:00) (74 - 98)  RR: 24 (28 Mar 2023 08:00) (13 - 46)  SpO2: 95% (28 Mar 2023 08:00) (93% - 99%)      I&O's Detail    27 Mar 2023 07:01  -  28 Mar 2023 07:00  --------------------------------------------------------  IN:    IV PiggyBack: 150 mL    Oral Fluid: 920 mL  Total IN: 1070 mL    OUT:    Indwelling Catheter - Urethral (mL): 340 mL    Voided (mL): 1150 mL  Total OUT: 1490 mL        Net:   I&O's Detail    26 Mar 2023 07:01  -  27 Mar 2023 07:00  --------------------------------------------------------  Total NET: -1755 mL      27 Mar 2023 07:01  -  28 Mar 2023 07:00  --------------------------------------------------------  Total NET: -420 mL        CAPILLARY BLOOD GLUCOSE      POCT Blood Glucose.: 119 mg/dL (27 Mar 2023 21:37)  POCT Blood Glucose.: 126 mg/dL (27 Mar 2023 16:21)  POCT Blood Glucose.: 117 mg/dL (27 Mar 2023 11:32)    Physical Exam:  General: NAD; A&Ox3  Cardiac: S1/S2, RRR, no murmur, no rubs  Lungs: CTA b/l, no wheeze, no rales, no crackles  Abdomen: Soft/NT/ND; positive bowel sounds   Sternum: Intact, no click, incision healing well with no drainage  Incisions: Incisions clean/dry/intact  Extremities: +DP's    Central Venous Catheter: Yes[x]  Critical illness, venous access          Day #5  EPICARDIAL WIRES:  [x] YES [] NO                                                              Day #5  BOWEL MOVEMENT:  [x] YES  Day #4      LABS:                        9.1<L>  8.21  )-----------( 205      ( 28 Mar 2023 02:43 )             27.4<L>                        9.5<L>  8.90  )-----------( 210      ( 27 Mar 2023 16:15 )             29.1<L>    03-28    133<L>  |  96<L>  |  15  ----------------------------<  100<H>  4.4   |  27  |  0.7  03-27    132<L>  |  93<L>  |  13  ----------------------------<  137<H>  4.4   |  28  |  0.7    Ca    8.9      28 Mar 2023 02:43  Mg     2.4     03-28          ABG - ( 27 Mar 2023 09:56 )  pH: 7.53  /  pCO2: 35    /  pO2: 84    / HCO3: 29    / Base Excess: 6.2   /  SaO2: 98.9  /  LA: 1.00           MEDICATIONS  (STANDING):  acetaminophen   IVPB .. 1000 milliGRAM(s) IV Intermittent once  albuterol    90 MICROgram(s) HFA Inhaler 2 Puff(s) Inhalation every 6 hours  albuterol/ipratropium for Nebulization 3 milliLiter(s) Nebulizer every 6 hours  aspirin enteric coated 325 milliGRAM(s) Oral daily  atorvastatin 40 milliGRAM(s) Oral at bedtime  bisacodyl 5 milliGRAM(s) Oral every 12 hours  cefTRIAXone   IVPB      cefTRIAXone   IVPB 1000 milliGRAM(s) IV Intermittent every 24 hours  chlorhexidine 2% Cloths 1 Application(s) Topical daily  dextrose 5%. 1000 milliLiter(s) (50 mL/Hr) IV Continuous <Continuous>  dextrose 5%. 1000 milliLiter(s) (100 mL/Hr) IV Continuous <Continuous>  dextrose 50% Injectable 25 Gram(s) IV Push once  dextrose 50% Injectable 12.5 Gram(s) IV Push once  dextrose 50% Injectable 25 Gram(s) IV Push once  dextrose 50% Injectable 50 milliLiter(s) IV Push every 15 minutes  dextrose 50% Injectable 25 milliLiter(s) IV Push every 15 minutes  famotidine    Tablet 20 milliGRAM(s) Oral two times a day  furosemide   Injectable 40 milliGRAM(s) IV Push daily  glucagon  Injectable 1 milliGRAM(s) IntraMuscular once  heparin   Injectable 3000 Unit(s) SubCutaneous every 12 hours  insulin lispro (ADMELOG) corrective regimen sliding scale   SubCutaneous three times a day before meals  ipratropium 17 MICROgram(s) HFA Inhaler 2 Puff(s) Inhalation every 6 hours  metoprolol tartrate 25 milliGRAM(s) Oral every 12 hours  polyethylene glycol 3350 17 Gram(s) Oral daily  potassium chloride    Tablet ER 20 milliEquivalent(s) Oral daily  senna 2 Tablet(s) Oral at bedtime  sodium chloride 0.9%. 1000 milliLiter(s) (10 mL/Hr) IV Continuous <Continuous>    MEDICATIONS  (PRN):  dextrose Oral Gel 15 Gram(s) Oral once PRN Blood Glucose LESS THAN 70 milliGRAM(s)/deciliter  oxyCODONE    IR 5 milliGRAM(s) Oral every 4 hours PRN Moderate Pain (4 - 6)  oxyCODONE    IR 10 milliGRAM(s) Oral every 4 hours PRN Severe Pain (7 - 10)    HEPARIN:  [x] YES [] NO  Dose: 3000 UNITS Q12H  SCD's: YES b/l  GI Prophylaxis: Pepcid [x]    Post-Op Aspirin: Yes [x]  Post-Op Statin: Yes [x]  Post-Op Beta-Blockers: Yes [x]    Allergies:  No Known Allergies      Ambulation/Activity Status: Ambulates several times daily with assistance.    Assessment/Plan:    62y Female status-post CABGx4 POD 5    -Pain management with tylenol and oxy prn  -Monitor HD's and maintain MAP 65-75  -Continue ASA/Statin/lovenox/BB  -Wires DC'd  -Hypoxia, wean FiO2 as tolerated by O2 saturation > 92%  -Encourage IS. Chest PT, Nebs prn  -PA/LL with right basilar effusion. Bedside US revealed small pocket of fluid. Will hold off on thoracentesis  -OOB to chair. Ambulate with PT as tolerated  -PO diet. GI PPx with pepcid  -Continue ISS coverage. Monitor FS  -Monitor UOP/Lytes. Replete for hypokalemia/hypomagnesemia  -Continue malin  -Monitor wbc/temp  -DVT ppx  -Continue CTU supportive care    -Continue to advance physical activity as tolerated and continue PT/OT as directed  -Case and plan discussed with CTU Intensivist and CT Surgeon - Dr. Moore/Devan/Dot                             OPERATIVE PROCEDURE(s):          CABGx4          POD #5                     62yFemale    SURGEON(s): DEYVI Chris    SUBJECTIVE ASSESSMENT:  Patient has no complaints at this time.      Vital Signs Last 24 Hrs  T(F): 99.2 (28 Mar 2023 07:45), Max: 99.2 (28 Mar 2023 07:45)  HR: 93 (28 Mar 2023 08:00) (80 - 108)  BP: 122/70 (28 Mar 2023 08:00) (103/63 - 142/66)  BP(mean): 91 (28 Mar 2023 08:00) (74 - 98)  RR: 24 (28 Mar 2023 08:00) (13 - 46)  SpO2: 95% (28 Mar 2023 08:00) (93% - 99%)      I&O's Detail    27 Mar 2023 07:01  -  28 Mar 2023 07:00  --------------------------------------------------------  IN:    IV PiggyBack: 150 mL    Oral Fluid: 920 mL  Total IN: 1070 mL    OUT:    Indwelling Catheter - Urethral (mL): 340 mL    Voided (mL): 1150 mL  Total OUT: 1490 mL        Net:   I&O's Detail    26 Mar 2023 07:01  -  27 Mar 2023 07:00  --------------------------------------------------------  Total NET: -1755 mL      27 Mar 2023 07:01  -  28 Mar 2023 07:00  --------------------------------------------------------  Total NET: -420 mL        CAPILLARY BLOOD GLUCOSE      POCT Blood Glucose.: 119 mg/dL (27 Mar 2023 21:37)  POCT Blood Glucose.: 126 mg/dL (27 Mar 2023 16:21)  POCT Blood Glucose.: 117 mg/dL (27 Mar 2023 11:32)    Physical Exam:  General: NAD; A&Ox3  Cardiac: S1/S2, RRR, no murmur, no rubs  Lungs: CTA b/l, no wheeze, no rales, no crackles  Abdomen: Soft/NT/ND; positive bowel sounds   Sternum: Intact, no click, incision healing well with no drainage  Incisions: Incisions clean/dry/intact  Extremities: +DP's    Central Venous Catheter: Yes[x]  Critical illness, venous access          Day #5  EPICARDIAL WIRES:  [x] YES [] NO                                                              Day #5  BOWEL MOVEMENT:  [x] YES  Day #4      LABS:                        9.1<L>  8.21  )-----------( 205      ( 28 Mar 2023 02:43 )             27.4<L>                        9.5<L>  8.90  )-----------( 210      ( 27 Mar 2023 16:15 )             29.1<L>    03-28    133<L>  |  96<L>  |  15  ----------------------------<  100<H>  4.4   |  27  |  0.7  03-27    132<L>  |  93<L>  |  13  ----------------------------<  137<H>  4.4   |  28  |  0.7    Ca    8.9      28 Mar 2023 02:43  Mg     2.4     03-28          ABG - ( 27 Mar 2023 09:56 )  pH: 7.53  /  pCO2: 35    /  pO2: 84    / HCO3: 29    / Base Excess: 6.2   /  SaO2: 98.9  /  LA: 1.00           MEDICATIONS  (STANDING):  acetaminophen   IVPB .. 1000 milliGRAM(s) IV Intermittent once  albuterol    90 MICROgram(s) HFA Inhaler 2 Puff(s) Inhalation every 6 hours  albuterol/ipratropium for Nebulization 3 milliLiter(s) Nebulizer every 6 hours  aspirin enteric coated 325 milliGRAM(s) Oral daily  atorvastatin 40 milliGRAM(s) Oral at bedtime  bisacodyl 5 milliGRAM(s) Oral every 12 hours  cefTRIAXone   IVPB      cefTRIAXone   IVPB 1000 milliGRAM(s) IV Intermittent every 24 hours  chlorhexidine 2% Cloths 1 Application(s) Topical daily  dextrose 5%. 1000 milliLiter(s) (50 mL/Hr) IV Continuous <Continuous>  dextrose 5%. 1000 milliLiter(s) (100 mL/Hr) IV Continuous <Continuous>  dextrose 50% Injectable 25 Gram(s) IV Push once  dextrose 50% Injectable 12.5 Gram(s) IV Push once  dextrose 50% Injectable 25 Gram(s) IV Push once  dextrose 50% Injectable 50 milliLiter(s) IV Push every 15 minutes  dextrose 50% Injectable 25 milliLiter(s) IV Push every 15 minutes  famotidine    Tablet 20 milliGRAM(s) Oral two times a day  furosemide   Injectable 40 milliGRAM(s) IV Push daily  glucagon  Injectable 1 milliGRAM(s) IntraMuscular once  heparin   Injectable 3000 Unit(s) SubCutaneous every 12 hours  insulin lispro (ADMELOG) corrective regimen sliding scale   SubCutaneous three times a day before meals  ipratropium 17 MICROgram(s) HFA Inhaler 2 Puff(s) Inhalation every 6 hours  metoprolol tartrate 25 milliGRAM(s) Oral every 12 hours  polyethylene glycol 3350 17 Gram(s) Oral daily  potassium chloride    Tablet ER 20 milliEquivalent(s) Oral daily  senna 2 Tablet(s) Oral at bedtime  sodium chloride 0.9%. 1000 milliLiter(s) (10 mL/Hr) IV Continuous <Continuous>    MEDICATIONS  (PRN):  dextrose Oral Gel 15 Gram(s) Oral once PRN Blood Glucose LESS THAN 70 milliGRAM(s)/deciliter  oxyCODONE    IR 5 milliGRAM(s) Oral every 4 hours PRN Moderate Pain (4 - 6)  oxyCODONE    IR 10 milliGRAM(s) Oral every 4 hours PRN Severe Pain (7 - 10)    HEPARIN:  [x] YES [] NO  Dose: 3000 UNITS Q12H  SCD's: YES b/l  GI Prophylaxis: Pepcid [x]    Post-Op Aspirin: Yes [x]  Post-Op Statin: Yes [x]  Post-Op Beta-Blockers: Yes [x]    Allergies:  No Known Allergies      Ambulation/Activity Status: Ambulates several times daily with assistance.    Assessment/Plan:    62y Female status-post CABGx4 POD 5    -Pain management with tylenol and oxy prn  -Monitor HD's and maintain MAP 65-75  -Continue ASA/Statin/lovenox/BB  -Wires DC'd  -Hypoxia, wean FiO2 as tolerated by O2 saturation > 92%  -Encourage IS. Chest PT, Nebs prn  -PA/LL with right basilar effusion. Bedside US revealed small pocket of fluid. Will hold off on thoracentesis  -OOB to chair. Ambulate with PT as tolerated  -PO diet. GI PPx with pepcid  -Continue ISS coverage. Monitor FS  -Lasix increased to 40mg IV q 12 with potassium supplements. Monitor UOP/Lytes. Replete for hypokalemia/hypomagnesemia  -Monitor wbc/temp  -DVT ppx  -Continue CTU supportive care    -Continue to advance physical activity as tolerated and continue PT/OT as directed  -Case and plan discussed with CTU Intensivist and CT Surgeon - Dr. Moore/Devan/Dot

## 2023-03-28 NOTE — DISCHARGE NOTE NURSING/CASE MANAGEMENT/SOCIAL WORK - NSDCPEWEB_GEN_ALL_CORE
[facility] for Tobacco Control website --- [email or web address] website --- [email or web address]

## 2023-03-28 NOTE — DISCHARGE NOTE NURSING/CASE MANAGEMENT/SOCIAL WORK - NSDCFUADDAPPT_GEN_ALL_CORE_FT
please follow up with dr walker on Monday 4/03 at 1pm    please call cardiology for follow up appointment in 2 weeks and please call pmd for appointment in 4 weeks from discharge

## 2023-03-28 NOTE — DISCHARGE NOTE NURSING/CASE MANAGEMENT/SOCIAL WORK - NSDCPEEMAIL_GEN_ALL_CORE
[facility] for Tobacco Control email [email or web address]

## 2023-03-29 ENCOUNTER — APPOINTMENT (OUTPATIENT)
Dept: CARE COORDINATION | Facility: HOME HEALTH | Age: 63
End: 2023-03-29
Payer: MEDICAID

## 2023-03-29 VITALS
HEART RATE: 98 BPM | DIASTOLIC BLOOD PRESSURE: 62 MMHG | RESPIRATION RATE: 28 BRPM | OXYGEN SATURATION: 94 % | SYSTOLIC BLOOD PRESSURE: 127 MMHG

## 2023-03-29 LAB
ANION GAP SERPL CALC-SCNC: 11 MMOL/L — SIGNIFICANT CHANGE UP (ref 7–14)
BASOPHILS # BLD AUTO: 0.03 K/UL — SIGNIFICANT CHANGE UP (ref 0–0.2)
BASOPHILS NFR BLD AUTO: 0.4 % — SIGNIFICANT CHANGE UP (ref 0–1)
BUN SERPL-MCNC: 15 MG/DL — SIGNIFICANT CHANGE UP (ref 10–20)
CALCIUM SERPL-MCNC: 9.1 MG/DL — SIGNIFICANT CHANGE UP (ref 8.4–10.4)
CHLORIDE SERPL-SCNC: 97 MMOL/L — LOW (ref 98–110)
CO2 SERPL-SCNC: 27 MMOL/L — SIGNIFICANT CHANGE UP (ref 17–32)
CREAT SERPL-MCNC: 0.7 MG/DL — SIGNIFICANT CHANGE UP (ref 0.7–1.5)
EGFR: 97 ML/MIN/1.73M2 — SIGNIFICANT CHANGE UP
EOSINOPHIL # BLD AUTO: 0.19 K/UL — SIGNIFICANT CHANGE UP (ref 0–0.7)
EOSINOPHIL NFR BLD AUTO: 2.6 % — SIGNIFICANT CHANGE UP (ref 0–8)
GLUCOSE BLDC GLUCOMTR-MCNC: 116 MG/DL — HIGH (ref 70–99)
GLUCOSE BLDC GLUCOMTR-MCNC: 118 MG/DL — HIGH (ref 70–99)
GLUCOSE BLDC GLUCOMTR-MCNC: 99 MG/DL — SIGNIFICANT CHANGE UP (ref 70–99)
GLUCOSE SERPL-MCNC: 96 MG/DL — SIGNIFICANT CHANGE UP (ref 70–99)
HCT VFR BLD CALC: 26.9 % — LOW (ref 37–47)
HGB BLD-MCNC: 8.8 G/DL — LOW (ref 12–16)
IMM GRANULOCYTES NFR BLD AUTO: 0.7 % — HIGH (ref 0.1–0.3)
LYMPHOCYTES # BLD AUTO: 1.84 K/UL — SIGNIFICANT CHANGE UP (ref 1.2–3.4)
LYMPHOCYTES # BLD AUTO: 25.5 % — SIGNIFICANT CHANGE UP (ref 20.5–51.1)
MAGNESIUM SERPL-MCNC: 2 MG/DL — SIGNIFICANT CHANGE UP (ref 1.8–2.4)
MCHC RBC-ENTMCNC: 30.2 PG — SIGNIFICANT CHANGE UP (ref 27–31)
MCHC RBC-ENTMCNC: 32.7 G/DL — SIGNIFICANT CHANGE UP (ref 32–37)
MCV RBC AUTO: 92.4 FL — SIGNIFICANT CHANGE UP (ref 81–99)
MONOCYTES # BLD AUTO: 1.1 K/UL — HIGH (ref 0.1–0.6)
MONOCYTES NFR BLD AUTO: 15.2 % — HIGH (ref 1.7–9.3)
NEUTROPHILS # BLD AUTO: 4.01 K/UL — SIGNIFICANT CHANGE UP (ref 1.4–6.5)
NEUTROPHILS NFR BLD AUTO: 55.6 % — SIGNIFICANT CHANGE UP (ref 42.2–75.2)
NRBC # BLD: 0 /100 WBCS — SIGNIFICANT CHANGE UP (ref 0–0)
PLATELET # BLD AUTO: 240 K/UL — SIGNIFICANT CHANGE UP (ref 130–400)
POTASSIUM SERPL-MCNC: 4.3 MMOL/L — SIGNIFICANT CHANGE UP (ref 3.5–5)
POTASSIUM SERPL-SCNC: 4.3 MMOL/L — SIGNIFICANT CHANGE UP (ref 3.5–5)
RBC # BLD: 2.91 M/UL — LOW (ref 4.2–5.4)
RBC # FLD: 13.8 % — SIGNIFICANT CHANGE UP (ref 11.5–14.5)
SODIUM SERPL-SCNC: 135 MMOL/L — SIGNIFICANT CHANGE UP (ref 135–146)
WBC # BLD: 7.22 K/UL — SIGNIFICANT CHANGE UP (ref 4.8–10.8)
WBC # FLD AUTO: 7.22 K/UL — SIGNIFICANT CHANGE UP (ref 4.8–10.8)

## 2023-03-29 PROCEDURE — 99232 SBSQ HOSP IP/OBS MODERATE 35: CPT

## 2023-03-29 PROCEDURE — 99024 POSTOP FOLLOW-UP VISIT: CPT

## 2023-03-29 PROCEDURE — 71046 X-RAY EXAM CHEST 2 VIEWS: CPT | Mod: 26

## 2023-03-29 PROCEDURE — 93010 ELECTROCARDIOGRAM REPORT: CPT

## 2023-03-29 RX ORDER — OXYCODONE AND ACETAMINOPHEN 5; 325 MG/1; MG/1
1 TABLET ORAL
Qty: 30 | Refills: 0
Start: 2023-03-29

## 2023-03-29 RX ORDER — ALBUTEROL 90 UG/1
2 AEROSOL, METERED ORAL
Qty: 0 | Refills: 0 | DISCHARGE

## 2023-03-29 RX ORDER — FERROUS SULFATE 325(65) MG
1 TABLET ORAL
Qty: 30 | Refills: 0
Start: 2023-03-29

## 2023-03-29 RX ORDER — METOPROLOL TARTRATE 50 MG
1 TABLET ORAL
Qty: 60 | Refills: 0
Start: 2023-03-29

## 2023-03-29 RX ORDER — ATORVASTATIN CALCIUM 80 MG/1
1 TABLET, FILM COATED ORAL
Qty: 30 | Refills: 0
Start: 2023-03-29 | End: 2023-04-27

## 2023-03-29 RX ORDER — ATORVASTATIN CALCIUM 80 MG/1
1 TABLET, FILM COATED ORAL
Qty: 0 | Refills: 0 | DISCHARGE

## 2023-03-29 RX ORDER — MULTIVIT-MIN/FERROUS GLUCONATE 9 MG/15 ML
1 LIQUID (ML) ORAL
Qty: 30 | Refills: 0
Start: 2023-03-29

## 2023-03-29 RX ORDER — ASPIRIN/CALCIUM CARB/MAGNESIUM 324 MG
1 TABLET ORAL
Qty: 30 | Refills: 0
Start: 2023-03-29

## 2023-03-29 RX ORDER — FUROSEMIDE 40 MG
1 TABLET ORAL
Qty: 30 | Refills: 0
Start: 2023-03-29

## 2023-03-29 RX ORDER — POTASSIUM CHLORIDE 20 MEQ
1 PACKET (EA) ORAL
Qty: 30 | Refills: 0
Start: 2023-03-29

## 2023-03-29 RX ORDER — ASPIRIN/CALCIUM CARB/MAGNESIUM 324 MG
1 TABLET ORAL
Qty: 0 | Refills: 0 | DISCHARGE

## 2023-03-29 RX ORDER — FAMOTIDINE 10 MG/ML
1 INJECTION INTRAVENOUS
Qty: 60 | Refills: 0
Start: 2023-03-29

## 2023-03-29 RX ORDER — AMLODIPINE BESYLATE 2.5 MG/1
1 TABLET ORAL
Qty: 0 | Refills: 0 | DISCHARGE

## 2023-03-29 RX ORDER — CARVEDILOL PHOSPHATE 80 MG/1
1 CAPSULE, EXTENDED RELEASE ORAL
Qty: 0 | Refills: 0 | DISCHARGE

## 2023-03-29 RX ORDER — FOLIC ACID 0.8 MG
1 TABLET ORAL
Qty: 30 | Refills: 0
Start: 2023-03-29

## 2023-03-29 RX ADMIN — Medication 40 MILLIGRAM(S): at 05:55

## 2023-03-29 RX ADMIN — Medication 20 MILLIEQUIVALENT(S): at 11:30

## 2023-03-29 RX ADMIN — OXYCODONE HYDROCHLORIDE 10 MILLIGRAM(S): 5 TABLET ORAL at 08:21

## 2023-03-29 RX ADMIN — Medication 3 MILLILITER(S): at 07:50

## 2023-03-29 RX ADMIN — HEPARIN SODIUM 3000 UNIT(S): 5000 INJECTION INTRAVENOUS; SUBCUTANEOUS at 05:55

## 2023-03-29 RX ADMIN — Medication 325 MILLIGRAM(S): at 11:31

## 2023-03-29 RX ADMIN — Medication 1 MILLIGRAM(S): at 11:30

## 2023-03-29 RX ADMIN — Medication 5 MILLIGRAM(S): at 05:55

## 2023-03-29 RX ADMIN — Medication 25 MILLIGRAM(S): at 05:55

## 2023-03-29 RX ADMIN — Medication 1 TABLET(S): at 11:30

## 2023-03-29 RX ADMIN — FAMOTIDINE 20 MILLIGRAM(S): 10 INJECTION INTRAVENOUS at 05:55

## 2023-03-29 RX ADMIN — Medication 325 MILLIGRAM(S): at 11:30

## 2023-03-29 RX ADMIN — OXYCODONE HYDROCHLORIDE 10 MILLIGRAM(S): 5 TABLET ORAL at 08:51

## 2023-03-29 RX ADMIN — CHLORHEXIDINE GLUCONATE 1 APPLICATION(S): 213 SOLUTION TOPICAL at 06:31

## 2023-03-29 NOTE — PROGRESS NOTE ADULT - PROVIDER SPECIALTY LIST ADULT
CT Surgery
Critical Care
Internal Medicine
CT Surgery
Critical Care
Hospitalist
Internal Medicine
Critical Care
Hospitalist
Internal Medicine

## 2023-03-29 NOTE — PROGRESS NOTE ADULT - SUBJECTIVE AND OBJECTIVE BOX
CTU Attending Progress Daily Note     29 Mar 2023 12:52  POD# - 6  He has history of Hypertension    High cholesterol      Interval event for past 24 hr:  JENNIFER TORRES  62y had no event.   Current Complains:  JENNIFER TORRES has no new complains  HPI:  63 y/o female with PMHx of HTN, HLD, COPD not on home O2, pre-DM, active smoker presented to the ED for chest tightness since 9am yesterday. She reports after breakfast she sat on the couch watching tv, when she suddenly developed substernal chest tightness with SOB and flushing. She got up to get her inhalers when she became dizzy prompting her ED visit yesterday. She described the chest tightness as non-radiating, substernal, improves on ambulation, worse on rest. She denies any similar episodes in the past. Denies any fevers, chills, abdominal pain, nausea, vomiting, diarrhea, constipation, LE swelling. Never had stress test before, no cardiac cath done. FH+ father  from MI at age 60.    In the ED, VS noted for T 36.4, HR 70, /77, RR 17, SpO2 98%. Labs noted for WBC 5, Hg 12, plt 187, K 4.3, Cr 0.9, Glu 96. Trop <0.01 x2. EKG showed sinus with PVCS and nonspecific T wave abnormality. Placed in ED-obs for CCTA. CCTA showed high calcium score. Admitted for nuclear stress test. Currently symptom free.  (18 Mar 2023 14:58)    OBJECTIVE:  ICU Vital Signs Last 24 Hrs  T(C): 37.1 (29 Mar 2023 08:00), Max: 37.2 (28 Mar 2023 16:00)  T(F): 98.7 (29 Mar 2023 08:00), Max: 99 (28 Mar 2023 16:00)  HR: 91 (29 Mar 2023 11:00) (81 - 113)  BP: 104/61 (29 Mar 2023 11:00) (104/61 - 161/67)  BP(mean): 77 (29 Mar 2023 11:00) (77 - 99)  ABP: --  ABP(mean): --  RR: 25 (29 Mar 2023 11:00) (17 - 42)  SpO2: 93% (29 Mar 2023 11:00) (89% - 98%)    O2 Parameters below as of 29 Mar 2023 08:00  Patient On (Oxygen Delivery Method): room air          I&O's Summary    28 Mar 2023 07:  -  29 Mar 2023 07:00  --------------------------------------------------------  IN: 890 mL / OUT: 1250 mL / NET: -360 mL    29 Mar 2023 07:  -  29 Mar 2023 12:52  --------------------------------------------------------  IN: 0 mL / OUT: 450 mL / NET: -450 mL      I&O's Detail    28 Mar 2023 07:  -  29 Mar 2023 07:00  --------------------------------------------------------  IN:    IV PiggyBack: 50 mL    Oral Fluid: 840 mL  Total IN: 890 mL    OUT:    Voided (mL): 1250 mL  Total OUT: 1250 mL    Total NET: -360 mL      29 Mar 2023 07:  -  29 Mar 2023 12:52  --------------------------------------------------------  IN:  Total IN: 0 mL    OUT:    Voided (mL): 450 mL  Total OUT: 450 mL    Total NET: -450 mL        Adult Advanced Hemodynamics Last 24 Hrs  CVP(mm Hg): --  CVP(cm H2O): --  CO: --  CI: --  PA: --  PA(mean): --  PCWP: --  SVR: --  SVRI: --  PVR: --  PVRI: --    CAPILLARY BLOOD GLUCOSE      POCT Blood Glucose.: 116 mg/dL (29 Mar 2023 07:24)    LABS:                          8.8    7.22  )-----------( 240      ( 29 Mar 2023 01:40 )             26.9         135  |  97<L>  |  15  ----------------------------<  96  4.3   |  27  |  0.7    Ca    9.1      29 Mar 2023 01:40  Mg     2.0                 Home Medications:  Combivent Respimat 20 mcg-100 mcg/inh inhalation aerosol: 1 puff(s) inhaled 4 times a day (18 Mar 2023 15:41)    HOSPITAL MEDICATIONS:  MEDICATIONS  (STANDING):  acetaminophen   IVPB .. 1000 milliGRAM(s) IV Intermittent once  albuterol/ipratropium for Nebulization 3 milliLiter(s) Nebulizer every 6 hours  aspirin enteric coated 325 milliGRAM(s) Oral daily  atorvastatin 40 milliGRAM(s) Oral at bedtime  bisacodyl 5 milliGRAM(s) Oral every 12 hours  cefTRIAXone   IVPB      cefTRIAXone   IVPB 1000 milliGRAM(s) IV Intermittent every 24 hours  chlorhexidine 2% Cloths 1 Application(s) Topical daily  dextrose 5%. 1000 milliLiter(s) (100 mL/Hr) IV Continuous <Continuous>  dextrose 5%. 1000 milliLiter(s) (50 mL/Hr) IV Continuous <Continuous>  dextrose 50% Injectable 25 milliLiter(s) IV Push every 15 minutes  dextrose 50% Injectable 50 milliLiter(s) IV Push every 15 minutes  dextrose 50% Injectable 25 Gram(s) IV Push once  dextrose 50% Injectable 12.5 Gram(s) IV Push once  dextrose 50% Injectable 25 Gram(s) IV Push once  famotidine    Tablet 20 milliGRAM(s) Oral two times a day  ferrous    sulfate 325 milliGRAM(s) Oral daily  folic acid 1 milliGRAM(s) Oral daily  furosemide   Injectable 40 milliGRAM(s) IV Push every 12 hours  glucagon  Injectable 1 milliGRAM(s) IntraMuscular once  heparin   Injectable 3000 Unit(s) SubCutaneous every 12 hours  insulin lispro (ADMELOG) corrective regimen sliding scale   SubCutaneous three times a day before meals  metoprolol tartrate 25 milliGRAM(s) Oral every 12 hours  multivitamin/minerals 1 Tablet(s) Oral daily  polyethylene glycol 3350 17 Gram(s) Oral daily  potassium chloride    Tablet ER 20 milliEquivalent(s) Oral daily  senna 2 Tablet(s) Oral at bedtime    MEDICATIONS  (PRN):  dextrose Oral Gel 15 Gram(s) Oral once PRN Blood Glucose LESS THAN 70 milliGRAM(s)/deciliter  oxyCODONE    IR 10 milliGRAM(s) Oral every 4 hours PRN Severe Pain (7 - 10)  oxyCODONE    IR 5 milliGRAM(s) Oral every 4 hours PRN Moderate Pain (4 - 6)      REVIEW OF SYSTEMS:  CONSTITUTIONAL: [X] all negative; [ ] weakness, [ ] fevers, [ ] chills  EYES/ENT: [X] all negative; [ ] visual changes, [ ] vertigo, [ ] throat pain   NECK: [X] all negative; [ ] pain, [ ] stiffness  RESPIRATORY: [] all negative, [ ] cough, [ ] wheezing, [ ] hemoptysis, [ ] shortness of breath  CARDIOVASCULAR: [] all negative; [ ] chest pain, [ ] palpitations, [ ] orthopnea  GASTROINTESTINAL: [X] all negative; [ ]abdominal pain, [ ] nausea, [ ] vomiting, [ ] hematemesis, [ ] diarrhea, [ ] constipation, [ ] melena, [ ] hematochezia.  GENITOURINARY: [X] all negative; [ ] dysuria, [ ] frequency, [ ] hematuria  NEUROLOGICAL: [X] all negative; [ ] numbness, [ ] weakness  SKIN: [X] all negative; [ ] itching, [ ] burning, [ ] rashes, [ ] lesions   All other review of systems is negative unless indicated above.    [  ] Unable to assess ROS because     PHYSICAL EXAM:          CONSTITUTIONAL: Well-developed; well-nourished; in no acute distress.   	SKIN: warm, dry  	HEAD: Normocephalic; atraumatic.  	EYES: PERRL, EOM, no conj injection, sclera clear  	ENT: No nasal discharge; airway clear.  	NECK: Supple; non tender.  No midline ttp ctls  	CARD: S1, S2 normal; no murmurs, gallops, or rubs. Regular rate and rhythm. 2+ RPs and DPs bilat, no carotid bruits, no pedal   edema, no calf pain b/l  	RESP: CTA  bilat good air movement No wheezes, rales or rhonchi.  	ABD: Soft, not tender, not distended, no CVA ttp no rebound or guarding, bowel sounds present  	EXT: Normal ROM.  No clubbing, cyanosis or edema.   	  	NEURO: Alert, awake, motor 5/5 R, 5/5 L        RADIOLOGY:  xray  < from: Xray Chest 2 Views PA/Lat (23 @ 09:35) >    Impression:    Stable bibasilar opacities    --- End of Report ---        < end of copied text >    I spent 45 minutes of critical care time ; more than 50% of visit was spent counseling and/or examining patient, reviewing vitals, labs, medications, imaging and discussing with the team goals of care to prevent life-threatening in this patient who is at high risk for deterioration or death due to:

## 2023-03-29 NOTE — PROGRESS NOTE ADULT - SUBJECTIVE AND OBJECTIVE BOX
OPERATIVE PROCEDURE(s):      cabg x 4          POD # 6    SURGEON(s): devan    SUBJECTIVE ASSESSMENT: pt is without complaints and is eager to go home     Vital Signs Last 24 Hrs  T(C): 37.1 (29 Mar 2023 08:00), Max: 37.2 (28 Mar 2023 16:00)  T(F): 98.7 (29 Mar 2023 08:00), Max: 99 (28 Mar 2023 16:00)  HR: 89 (29 Mar 2023 08:00) (81 - 113)  BP: 138/61 (29 Mar 2023 08:00) (114/60 - 161/67)  BP(mean): 86 (29 Mar 2023 08:00) (81 - 99)  RR: 31 (29 Mar 2023 08:00) (17 - 42)  SpO2: 94% (29 Mar 2023 08:00) (89% - 98%)    Parameters below as of 29 Mar 2023 08:00  Patient On (Oxygen Delivery Method): room air      03-28-23 @ 07:01  -  03-29-23 @ 07:00  --------------------------------------------------------  IN: 890 mL / OUT: 1250 mL / NET: -360 mL    03-29-23 @ 07:01  -  03-29-23 @ 10:01  --------------------------------------------------------  IN: 0 mL / OUT: 450 mL / NET: -450 mL      Physical Exam:  General: NAD; A&Ox3  Cardiac: S1/S2, RRR, no murmur, no rubs  Lungs: CTA b/l, no wheeze, no rales, no crackles  Abdomen: Soft/NT/ND; positive bowel sounds   Sternum: Intact, no click, incision healing well with no drainage  Incisions: Incisions clean/dry/intact  Extremities: +DP's, no sig edema    LABS:                        8.8    7.22  )-----------( 240      ( 29 Mar 2023 01:40 )             26.9     COUMADIN:   [ ] YES [x ] NO      03-29    135  |  97<L>  |  15  ----------------------------<  96  4.3   |  27  |  0.7    Ca    9.1      29 Mar 2023 01:40  Mg     2.0     03-29    MEDICATIONS  (STANDING):  acetaminophen   IVPB .. 1000 milliGRAM(s) IV Intermittent once  albuterol/ipratropium for Nebulization 3 milliLiter(s) Nebulizer every 6 hours  aspirin enteric coated 325 milliGRAM(s) Oral daily  atorvastatin 40 milliGRAM(s) Oral at bedtime  bisacodyl 5 milliGRAM(s) Oral every 12 hours  cefTRIAXone   IVPB      cefTRIAXone   IVPB 1000 milliGRAM(s) IV Intermittent every 24 hours  chlorhexidine 2% Cloths 1 Application(s) Topical daily  dextrose 5%. 1000 milliLiter(s) (50 mL/Hr) IV Continuous <Continuous>  dextrose 5%. 1000 milliLiter(s) (100 mL/Hr) IV Continuous <Continuous>  dextrose 50% Injectable 25 Gram(s) IV Push once  dextrose 50% Injectable 25 Gram(s) IV Push once  dextrose 50% Injectable 12.5 Gram(s) IV Push once  dextrose 50% Injectable 50 milliLiter(s) IV Push every 15 minutes  dextrose 50% Injectable 25 milliLiter(s) IV Push every 15 minutes  famotidine    Tablet 20 milliGRAM(s) Oral two times a day  ferrous    sulfate 325 milliGRAM(s) Oral daily  folic acid 1 milliGRAM(s) Oral daily  furosemide   Injectable 40 milliGRAM(s) IV Push every 12 hours  glucagon  Injectable 1 milliGRAM(s) IntraMuscular once  heparin   Injectable 3000 Unit(s) SubCutaneous every 12 hours  insulin lispro (ADMELOG) corrective regimen sliding scale   SubCutaneous three times a day before meals  metoprolol tartrate 25 milliGRAM(s) Oral every 12 hours  multivitamin/minerals 1 Tablet(s) Oral daily  polyethylene glycol 3350 17 Gram(s) Oral daily  potassium chloride    Tablet ER 20 milliEquivalent(s) Oral daily  senna 2 Tablet(s) Oral at bedtime    MEDICATIONS  (PRN):  dextrose Oral Gel 15 Gram(s) Oral once PRN Blood Glucose LESS THAN 70 milliGRAM(s)/deciliter  oxyCODONE    IR 10 milliGRAM(s) Oral every 4 hours PRN Severe Pain (7 - 10)  oxyCODONE    IR 5 milliGRAM(s) Oral every 4 hours PRN Moderate Pain (4 - 6)      Allergies    No Known Allergies    Intolerances        Ambulation/Activity Status:  amb well with pt      RADIOLOGY & ADDITIONAL TESTS:  ACC: [id number] EXAM:  XR CHEST PA LAT 2V   ORDERED BY: DENISSE OLEARY     PROCEDURE DATE:  03/29/2023      INTERPRETATION:  Clinical History / Reason for exam: Shortness of breath,   status post CABG    Comparison : Chest radiograph 3/28/2023 and 3/17/2023.    Technique/Positioning: Frontal, lateral.    Findings:    Support devices: Telemetry leads overlie patient. There is a right IJ   central line sheath    Cardiac/mediastinum/hilum: Status post sternotomy with postsurgical change    Lung parenchyma/Pleura: Again seen are bibasilar opacities not   significantly changed since prior examination    Skeleton/soft tissues: Stable    Impression:    Stable bibasilar opacities    --- End of Report ---    Assessment/Plan:    62y Female status-post CABGx4 POD #6  -Pain management with tylenol and oxy prn  -Monitor HD's and maintain MAP 65-75  -Continue ASA/Statin/lovenox/BB  -Hypoxia, wean FiO2 as tolerated by O2 saturation > 92%  -Encourage IS. Chest PT, Nebs prn  -OOB to chair. Ambulate with PT as tolerated  -PO diet. GI PPx with pepcid  -Continue ISS coverage. Monitor FS  -Monitor UOP/Lytes. Replete for hypokalemia/hypomagnesemia  -Monitor wbc/temp  -DVT ppx  -Continue CTU supportive care    -Continue to advance physical activity as tolerated and continue PT/OT as directed  -Case and plan discussed with CTU Intensivist and CT Surgeon - Dr. Moore/Devan/Dot  s/p cabg- cont asa, lopressor, and statin  smoking cessation - pt encouraged to take nicotine patch but refuses and understands the importance of refraining from smoking  d/c home today

## 2023-03-29 NOTE — PROGRESS NOTE ADULT - ASSESSMENT
61 y/o female with PMH of HTN, HLD, COPD not on home O2, pre-DM, active smoker presented to the ED for chest tightness for one day.       3V-CAD  Tobacco use  HTN / DL  Obesity stage 1  COPD             PLAN:    ·	Cont tele  ·	Preop w/u in progress. Scheduled for CABG in AM  ·	S/P cath on 3/20. 3V CAD. CTS eval as per cardiology  ·	CE x 2 are negative.   ·	ECHO reviewed. EF is 60 to 65%  ·	Trig is 37 and LDL is 48  ·	Nuclear stress test showed moderate to large severe reversible defect involving the inf wall of the LV  ·	Cont ASA, Lipitor and her other meds  ·	Counselled to quit smoking    Progress Note Handoff    Pending (specify):  Consults_________, Tests________, Test Results_______, Other__CABG in AM_______  Family discussion:  Disposition: Home___/SNF___/Other________/Unknown at this time________    Gera Daley MD  Spectra: [phone]
61 y/o female with PMHx of HTN, HLD, COPD not on home O2, pre-DM, active smoker presented to the ED for chest tightness since 9am yesterday.    # Chest pain, ACS ruled out  - CCTA with high calcium socre 1504, high likelihood of CAD  - c/w telemetry  - NM stress test done, pending result, Cardiology evaluation if positive stress test  - c/w aspirin and statin    # COPD not on home O2, stable   - c/w home inhalers    # Trace Pericardial Effusion incidentally noted on CCTA  - will obtain TTE to further assess    # Prominent Mediastinal LN, possibly reactive   - afebrile, no leukocytosis    # HTN  # HLD  - continue with amlodipine and atorvastatin 40mg   - will resume home coreg    # Pre-DM  - currently not on medications  - monitor FS, if FS>180 start basal/bolus    # Active smoker  - discussed smoking cessation    # DVT prophylaxis - on lovenox subcut  # GI prophylaxis - on protonix  # Code status - Full Code
Assessment/Plan:  CAD-s/p CABG x 4-POD #4  1-BP control-continue beta-blockers  2-serum glucose control-insulin sliding scale correction regimen  3-fluid overload-diuresis  4-acute blood loss anemia-stable, monitor Hb/Hct daily  4-ZTR-zzhfipwn statin  6-UTI-continue IV antibiotic tx    25 minutes of critical care services provided  
IMPRESSION:  CAD CABg pod 1 c4L  blood loss anemia      PLAN:    CNS: avoid CNS depressants    HEENT:  Oral care    PULMONARY:  HOB @ 45 degrees    CARDIOVASCULAR: wean dobutamine per protocol, dc SG, keep meds and ct for today  dc femoral joy  BB later during the day, continue aspirin. lipitor  GI: GI prophylaxis                                          Feeding po diet    RENAL:  F/u  lytes.  Correct as needed. accurate I/O    INFECTIOUS DISEASE: postop ppx    HEMATOLOGICAL:  DVT prophylaxis.    ENDOCRINE:  Follow up FS.  Insulin protocol if needed.    MUSCULOSKELETAL: OOB to chair    CODE STATUS: FULL CODE    DISPOSITION: Pt requires continued monitoring in the CTU    case discussed with ct surgeon
IMPRESSION:  CAD CABg pod 2 c4L  ASA beta blockers  statins   blood loss anemia monitor cbc       PLAN:    CNS: avoid CNS depressants    HEENT:  Oral care    PULMONARY:  HOB @ 45 degrees    CARDIOVASCULAR: wean dobutamine per protocol, dc SG, keep meds and ct for today  dc femoral joy  BB later during the day, continue aspirin. lipitor  GI: GI prophylaxis                                          Feeding po diet    RENAL:  F/u  lytes.  Correct as needed. accurate I/O    INFECTIOUS DISEASE: postop ppx    HEMATOLOGICAL:  DVT prophylaxis.    ENDOCRINE:  Follow up FS.  Insulin protocol if needed.    MUSCULOSKELETAL: OOB to chair    CODE STATUS: FULL CODE    DISPOSITION: Pt requires continued monitoring in the CTU    diuresis lasix 40 iv daily  d/c chest tubes  case discussed with ct surgeon
IMPRESSION:  CAD CABg pod 5 c4L  ASA beta blockers  statins   blood loss anemia monitor cbc   Fluid over load lasix upped to 40 q12 yestrday    to day will be discharged on 40 po daily       PLAN:    CNS: avoid CNS depressants    HEENT:  Oral care    PULMONARY:  HOB @ 45 degrees    CARDIOVASCULAR: wean dobutamine per protocol, dc SG, keep meds and ct for today  dc femoral joy  BB later during the day, continue aspirin. lipitor  GI: GI prophylaxis                                          Feeding po diet    RENAL:  F/u  lytes.  Correct as needed. accurate I/O    INFECTIOUS DISEASE: postop ppx    HEMATOLOGICAL:  DVT prophylaxis.    ENDOCRINE:  Follow up FS.  Insulin protocol if needed.    MUSCULOSKELETAL: OOB to chair    CODE STATUS: FULL CODE    DISPOSITION: Pt requires continued monitoring in the CTU      case discussed with ct surgeon
61 y/o female with PMH of HTN, HLD, COPD not on home O2, pre-DM, active smoker presented to the ED for chest tightness for one day.       3V-CAD  Tobacco use  HTN / DL  Obesity stage 1  COPD             PLAN:    ·	Tele reviewed. No events. Cont tele  ·	S/P cath on 3/20. 3V CAD. CTS eval as per cardiology  ·	CE x 2 are negative.   ·	ECHO reviewed. EF is 60 to 65%  ·	Trig is 37 and LDL is 48  ·	Nuclear stress test showed moderate to large severe reversible defect involving the inf wall of the LV  ·	Cont ASA, Lipitor and her other meds  ·	Counselled to quit smoking    Progress Note Handoff    Pending (specify):  Consults__CTS_______, Tests________, Test Results_______, Other__3VCAD_______  Family discussion:  Disposition: Home___/SNF___/Other________/Unknown at this time________    Gera Daley MD  Spectra: [phone]
CT surgery attending    Patient seen and examined on rounds as described above    Postop day 4  Status post urgent coronary artery bypass grafting surgery    Doing very well  Hemodynamically stable  Neurologically intact  Ambulating the hallways    Denies any chest pain or shortness of breath  Overall she is making an excellent recovery    The patient, her family as well as the care team updated regarding the plan and progress
CT surgery attending note    Patient seen and examined on morning rounds as described above  Postop day 1  Status post urgent coronary artery bypass grafting    Doing very well  Extubated uneventfully  Hemodynamically stable  Neurologically intact    Incisions are clean and dry and Sternum is stable    The patient and her family as well as the care team updated regarding the plan and progress  
IMPRESSION:  CAD CABg pod 3 c4L  blood loss anemia  UTI    PLAN:    CNS: avoid CNS depressants    HEENT:  Oral care    PULMONARY:  HOB @ 45 degrees    CARDIOVASCULAR:   continue BB , continue aspirin. lipitor, lasix keep -ve1-2 L /day  GI: GI prophylaxis                                          Feeding po diet    RENAL:  F/u  lytes.  Correct as needed. accurate I/O    INFECTIOUS DISEASE: on Rocephin     HEMATOLOGICAL:  DVT prophylaxis.    ENDOCRINE:  Follow up FS.       MUSCULOSKELETAL: OOB to chair, ambulate as tolerated    CODE STATUS: FULL CODE    DISPOSITION: Pt requires continued monitoring in the CTU    case discussed with ct surgery
IMPRESSION:  CAD CABg pod 5 c4L  ASA beta blockers  statins   blood loss anemia monitor cbc   Fluid over load lasix upped to 40 q12       PLAN:    CNS: avoid CNS depressants    HEENT:  Oral care    PULMONARY:  HOB @ 45 degrees    CARDIOVASCULAR: wean dobutamine per protocol, dc SG, keep meds and ct for today  dc femoral joy  BB later during the day, continue aspirin. lipitor  GI: GI prophylaxis                                          Feeding po diet    RENAL:  F/u  lytes.  Correct as needed. accurate I/O    INFECTIOUS DISEASE: postop ppx    HEMATOLOGICAL:  DVT prophylaxis.    ENDOCRINE:  Follow up FS.  Insulin protocol if needed.    MUSCULOSKELETAL: OOB to chair    CODE STATUS: FULL CODE    DISPOSITION: Pt requires continued monitoring in the CTU      case discussed with ct surgeon
CT surgery attending note    Patient seen and examined on rounds along with the care team as described above    Postop day 5  Status post urgent coronary artery bypass grafting surgery  Doing very well  Hemodynamically stable  Ambulating the hallways  Incisions are clean and dry and healing well    Patient refuses to wear a bra and understands the risk of wound issues    Overall she is making an excellent recovery  The patient, her family and care team updated regarding the plan and progress

## 2023-03-30 RX ORDER — POLYETHYLENE GLYCOL 3350 AND ELECTROLYTES WITH LEMON FLAVOR 236; 22.74; 6.74; 5.86; 2.97 G/4L; G/4L; G/4L; G/4L; G/4L
236 POWDER, FOR SOLUTION ORAL
Qty: 1 | Refills: 0 | Status: DISCONTINUED | COMMUNITY
Start: 2022-09-14 | End: 2023-03-30

## 2023-03-30 NOTE — PHYSICAL EXAM
[Neck Appearance] : the appearance of the neck was normal [FreeTextEntry1] : MSI and CT sites without erythema, drainage or warmth, with edges well approximated. Sternum stable\par \par   [FreeTextEntry2] : SVG harvest site without erythema, warmth or drainage. Resolving soft, NT ecchymosis to thigh area. \par \par   [Abnormal Walk] : normal gait [Skin Color & Pigmentation] : normal skin color and pigmentation [] : no rash [Cranial Nerves] : cranial nerves 2-12 were intact [Oriented To Time, Place, And Person] : oriented to person, place, and time

## 2023-03-30 NOTE — HISTORY OF PRESENT ILLNESS
[FreeTextEntry1] : Hospital Course: \par Discharge Date	29-Mar-2023 \par Admission Date	18-Mar-2023 14:17 \par Reason for Admission	Chest Pain \par Hospital Course	 \par 63 y/o female with PMHx of HTN, HLD, COPD not on home O2, pre-DM, active smoker \par (greater than 75 pack years) presented to the ED for chest tightness since 9am \par yesterday. She reports after breakfast she sat on the couch watching tv, when \par she suddenly developed substernal chest tightness with SOB and flushing. She \par got up to get her inhalers when she became dizzy prompting her ED visit \par yesterday. She described the chest tightness as non-radiating, substernal, \par improves on ambulation, worse on rest. She denies any similar episodes in the \par past. Denies any fevers, chills, abdominal pain, nausea, vomiting, diarrhea, \par constipation, LE swelling. Never had stress test before, no cardiac cath done. \par FH+ father  from MI at age 60. Today, patient underwent cardiac cath via \par right wrist which revealed left main disease, calcified LAD (80%), CX (90%), & \par RCA (60%)m and severe RPDA disease. CT surgery was consulted for possible \par myocardial revascularization via CABG. On 3/23/23 patient underwent a CABG x 4 \par and was treated with ABX for a UTI.  She otherwise had an uneventful hospital \par course and was discharged home in stable condition on POD 6. \par \par Today patient was seen s/p discharge from  [facility]. Patient informed they are being followed by [facility] program. Time was spent with the patient reviewing the discharge material including medications, follow up appointments, recovery, concerning symptoms, and how to contact me should they have questions. \par

## 2023-04-02 DIAGNOSIS — J44.9 CHRONIC OBSTRUCTIVE PULMONARY DISEASE, UNSPECIFIED: ICD-10-CM

## 2023-04-02 DIAGNOSIS — E83.42 HYPOMAGNESEMIA: ICD-10-CM

## 2023-04-02 DIAGNOSIS — E78.5 HYPERLIPIDEMIA, UNSPECIFIED: ICD-10-CM

## 2023-04-02 DIAGNOSIS — R73.03 PREDIABETES: ICD-10-CM

## 2023-04-02 DIAGNOSIS — E66.9 OBESITY, UNSPECIFIED: ICD-10-CM

## 2023-04-02 DIAGNOSIS — I10 ESSENTIAL (PRIMARY) HYPERTENSION: ICD-10-CM

## 2023-04-02 DIAGNOSIS — Z82.49 FAMILY HISTORY OF ISCHEMIC HEART DISEASE AND OTHER DISEASES OF THE CIRCULATORY SYSTEM: ICD-10-CM

## 2023-04-02 DIAGNOSIS — I21.4 NON-ST ELEVATION (NSTEMI) MYOCARDIAL INFARCTION: ICD-10-CM

## 2023-04-02 DIAGNOSIS — I25.110 ATHEROSCLEROTIC HEART DISEASE OF NATIVE CORONARY ARTERY WITH UNSTABLE ANGINA PECTORIS: ICD-10-CM

## 2023-04-02 DIAGNOSIS — Z79.82 LONG TERM (CURRENT) USE OF ASPIRIN: ICD-10-CM

## 2023-04-02 DIAGNOSIS — I25.84 CORONARY ATHEROSCLEROSIS DUE TO CALCIFIED CORONARY LESION: ICD-10-CM

## 2023-04-02 DIAGNOSIS — F17.210 NICOTINE DEPENDENCE, CIGARETTES, UNCOMPLICATED: ICD-10-CM

## 2023-04-02 DIAGNOSIS — I31.39 OTHER PERICARDIAL EFFUSION (NONINFLAMMATORY): ICD-10-CM

## 2023-04-02 DIAGNOSIS — N39.0 URINARY TRACT INFECTION, SITE NOT SPECIFIED: ICD-10-CM

## 2023-04-03 ENCOUNTER — APPOINTMENT (OUTPATIENT)
Dept: CARDIOTHORACIC SURGERY | Facility: CLINIC | Age: 63
End: 2023-04-03
Payer: MEDICAID

## 2023-04-03 ENCOUNTER — TRANSCRIPTION ENCOUNTER (OUTPATIENT)
Age: 63
End: 2023-04-03

## 2023-04-03 VITALS
RESPIRATION RATE: 14 BRPM | WEIGHT: 250 LBS | HEART RATE: 91 BPM | DIASTOLIC BLOOD PRESSURE: 69 MMHG | SYSTOLIC BLOOD PRESSURE: 102 MMHG | OXYGEN SATURATION: 90 % | BODY MASS INDEX: 40.18 KG/M2 | HEIGHT: 66 IN

## 2023-04-03 VITALS — OXYGEN SATURATION: 90 %

## 2023-04-03 DIAGNOSIS — Z00.00 ENCOUNTER FOR GENERAL ADULT MEDICAL EXAMINATION W/OUT ABNORMAL FINDINGS: ICD-10-CM

## 2023-04-03 PROCEDURE — 99024 POSTOP FOLLOW-UP VISIT: CPT

## 2023-04-04 NOTE — ASSESSMENT
[FreeTextEntry1] : 61 y/o female with PMHx of HTN, HLD, COPD not on home O2, pre-DM, active smoker (greater than 75 pack years) presented to the ED for chest tightness since 9am yesterday. She reports after breakfast she sat on the couch watching tv, when she suddenly developed substernal chest tightness with SOB and flushing. She got up to get her inhalers when she became dizzy prompting her ED visit yesterday. She described the chest tightness as non-radiating, substernal, improves on ambulation, worse on rest. She denies any similar episodes in the past. Denies any fevers, chills, abdominal pain, nausea, vomiting, diarrhea,constipation, LE swelling. Never had stress test before, no cardiac cath done. FH+ father  from MI at age 60. Today, patient underwent cardiac cath via right wrist which revealed left main disease, calcified LAD (80%), CX (90%), & RCA (60%)m and severe RPDA disease. CT surgery was consulted for possible myocardial revascularization via CABG. On 3/23/23 patient underwent a CABG x 4 and was treated with ABX for a UTI.  She otherwise had an uneventful hospital course and was discharged home in stable condition on POD 6. Here  for post op visit. \par \par \par Cardio: Salmane \par \par Overall pt states she is doing well, denies CP, SOB, Palpitations\par c/o of occasional SOB when walking, sat today 90-91% - denies SOB at rest - CXR today \par Walking daily and using IS \par She also states her VSS and FS have been stable at home\par She is also getting VNS\par Incisions are healing well - sternal incision with some serous drainage - pt states this has happened after her daily shower - no redness, steri strips intact - advised to continue washing twice daily soap and water and notify if any changes occur - Given ABX with probiotic\par LE incision healing well - no edema\par States she is compliant with her medications however, is unsure of what she is taking\par She is taking pain meds once daily and reports relief\par Sternal precautions reinforced \par \par Plan: \par Labs and CXR today\par Keflex TID x 10days - with probiotic\par F/U PMD for routine care\par F/U Cardio for continued management \par F/U CTS in 1 week with labs and CXR\par D/W Dr. Chris \par \par

## 2023-04-04 NOTE — REASON FOR VISIT
[de-identified] : CABG x 4  [de-identified] : 3/23/2023 [de-identified] : Post op - UTI - given ABX

## 2023-04-04 NOTE — PHYSICAL EXAM
[] : no respiratory distress [Heart Rate And Rhythm] : heart rate was normal and rhythm regular [Clean] : clean [Dry] : dry [Healing Well] : healing well [No Edema] : no edema [FreeTextEntry5] : MIKA DENT

## 2023-04-04 NOTE — COUNSELING
[Hygeine (Including Daily Shower)] : hygeine (including daily shower) [Importance of Regular Medical Follow-Up] : the importance of regular medical follow-up [No Heavy Lifting] : no heavy lifting (>15-20 lb. for 1 month or 25 lb. for 3 months from date of surgery) [Blood Pressure Control] : blood pressure control [S/S of infection] : signs and symptoms of infection (and to whom it should be reported) [Progressive Ambulation/Activity] : progressive ambulation/activity [Medication/Vitamin/Herb/Food Interaction] : medication/vitamin/herb/food interaction [FreeTextEntry1] : Ms. JENNIFER TORRES 62 year status post CABG. This is a follow up visit with the patient. During the post op visit all cardiac and sternal precautions were reviewed with the patient. Incision care was reviewed. Education regarding nutrition was also reviewed with the patient, to maintain a low salt diet. On arrival patient denies fever, chills nausea, and vomiting. Denies SOB or palpitation. All questions and concerns were addressed. Patient educated on diet and salt restrictions. Medications were reviewed with the patient, and education for cardiac surgery patient must continue aspirin, and statin post-operatively until re-evaluated by patients cardiologist. Patient was instructed to follow up with their cardiologist as appropriate for long term management.\par

## 2023-04-05 ENCOUNTER — TRANSCRIPTION ENCOUNTER (OUTPATIENT)
Age: 63
End: 2023-04-05

## 2023-04-06 ENCOUNTER — TRANSCRIPTION ENCOUNTER (OUTPATIENT)
Age: 63
End: 2023-04-06

## 2023-04-06 RX ORDER — IPRATROPIUM BROMIDE AND ALBUTEROL 20; 100 UG/1; UG/1
20-100 SPRAY, METERED RESPIRATORY (INHALATION) 4 TIMES DAILY
Qty: 1 | Refills: 0 | Status: ACTIVE | COMMUNITY
Start: 2023-03-30 | End: 1900-01-01

## 2023-04-08 ENCOUNTER — TRANSCRIPTION ENCOUNTER (OUTPATIENT)
Age: 63
End: 2023-04-08

## 2023-04-10 ENCOUNTER — APPOINTMENT (OUTPATIENT)
Dept: CARDIOTHORACIC SURGERY | Facility: CLINIC | Age: 63
End: 2023-04-10
Payer: MEDICAID

## 2023-04-10 VITALS
RESPIRATION RATE: 12 BRPM | BODY MASS INDEX: 40.18 KG/M2 | OXYGEN SATURATION: 88 % | TEMPERATURE: 98 F | HEIGHT: 66 IN | WEIGHT: 250 LBS | DIASTOLIC BLOOD PRESSURE: 75 MMHG | HEART RATE: 78 BPM | SYSTOLIC BLOOD PRESSURE: 121 MMHG

## 2023-04-10 VITALS — OXYGEN SATURATION: 94 %

## 2023-04-10 PROCEDURE — 99024 POSTOP FOLLOW-UP VISIT: CPT

## 2023-04-10 NOTE — PHYSICAL EXAM
[] : no respiratory distress [Respiration, Rhythm And Depth] : normal respiratory rhythm and effort [Exaggerated Use Of Accessory Muscles For Inspiration] : no accessory muscle use [Apical Impulse] : the apical impulse was normal [Heart Rate And Rhythm] : heart rate was normal and rhythm regular [Heart Sounds] : normal S1 and S2 [Clean] : clean [Dry] : dry [Healing Well] : healing well [No Edema] : no edema [FreeTextEntry1] : Sternum is stable and healing well and the skin is totally intact [Bleeding] : no active bleeding [Foul Odor] : no foul smell [Purulent Drainage] : no purulent drainage [Serosanguinous Drainage] : no serosanguinous drainage [Erythema] : not erythematous [Warm] : not warm [Tender] : not tender [FreeTextEntry5] : There is no edema in her legs

## 2023-04-10 NOTE — REASON FOR VISIT
[de-identified] : CABG x 4  [de-identified] : 3/23/2023 [de-identified] : Post op - UTI - given ABX.  Here for postop F/U\par The patient is a 62-year-old lady who underwent urgent coronary artery bypass grafting x4 for a non-ST elevation myocardial infarction with unstable angina with critical triple-vessel coronary artery disease with poorly controlled hypertension, obesity and severe COPD with more than a 75-pack-year history of smoking.\par \par She did very well in the postoperative.  And was discharged home on postop day 5.\par \par She returns today for her first postoperative visit and tells me that she has been living at home alone since her discharge on day 5 after open heart surgery.\par \par She denies any major complaints and denies any anginal chest pain or worsening shortness of breath.  She says that she has been able to stay off her smoking even though she was more than a pack a day smoker till the time of her admission.\par \par She has had no fevers and overall seems to be making an excellent recovery.\par \par She tells me that she did not get herself a bra even though she was advised to get 1 and fortunately the incisions have not given her any issues.

## 2023-04-10 NOTE — ASSESSMENT
[FreeTextEntry1] : 61 y/o female with PMHx of HTN, HLD, COPD not on home O2, pre-DM, active smoker (greater than 75 pack years) presented to the ED for chest tightness since 9am yesterday. She reports after breakfast she sat on the couch watching tv, when she suddenly developed substernal chest tightness with SOB and flushing. She got up to get her inhalers when she became dizzy prompting her ED visit yesterday. She described the chest tightness as non-radiating, substernal, improves on ambulation, worse on rest. She denies any similar episodes in the past. Denies any fevers, chills, abdominal pain, nausea, vomiting, diarrhea,constipation, LE swelling. Never had stress test before, no cardiac cath done. FH+ father  from MI at age 60. Today, patient underwent cardiac cath via right wrist which revealed left main disease, calcified LAD (80%), CX (90%), & RCA (60%)m and severe RPDA disease. CT surgery was consulted for possible myocardial revascularization via CABG. On 3/23/23 patient underwent a CABG x 4 and was treated with ABX for a UTI.  She otherwise had an uneventful hospital course and was discharged home in stable condition on POD 6. Here  for post op visit. \par \par Plan:\par Overall pt states she is doing well, denies CP, SOB, Palpitations\par Walking daily and using IS \par She also states her VSS and FS have been stable at home\par She is also getting VNS\par She is non-compliant with wearing a bra, she was advised to start wearing a regular bra\par F/U PMD for routine care\par F/U Cardio for continued management Dr. Erlin Santa \par F/U CTS in 2 weeks\par \par I, Rosina Flores [facility], am acting as scribe for

## 2023-04-11 LAB
ANION GAP SERPL CALC-SCNC: 14 MMOL/L
BASOPHILS # BLD AUTO: 0.05 K/UL
BASOPHILS NFR BLD AUTO: 0.7 %
BUN SERPL-MCNC: 13 MG/DL
CALCIUM SERPL-MCNC: 10 MG/DL
CHLORIDE SERPL-SCNC: 101 MMOL/L
CO2 SERPL-SCNC: 23 MMOL/L
CREAT SERPL-MCNC: 0.9 MG/DL
EGFR: 71 ML/MIN/1.73M2
EOSINOPHIL # BLD AUTO: 0.67 K/UL
EOSINOPHIL NFR BLD AUTO: 8.8 %
GLUCOSE SERPL-MCNC: 91 MG/DL
HCT VFR BLD CALC: 31.2 %
HGB BLD-MCNC: 9.5 G/DL
IMM GRANULOCYTES NFR BLD AUTO: 0.4 %
LYMPHOCYTES # BLD AUTO: 2.12 K/UL
LYMPHOCYTES NFR BLD AUTO: 28 %
MAN DIFF?: NORMAL
MCHC RBC-ENTMCNC: 28.8 PG
MCHC RBC-ENTMCNC: 30.4 G/DL
MCV RBC AUTO: 94.5 FL
MONOCYTES # BLD AUTO: 0.53 K/UL
MONOCYTES NFR BLD AUTO: 7 %
NEUTROPHILS # BLD AUTO: 4.18 K/UL
NEUTROPHILS NFR BLD AUTO: 55.1 %
PLATELET # BLD AUTO: 463 K/UL
POTASSIUM SERPL-SCNC: 4.5 MMOL/L
RBC # BLD: 3.3 M/UL
RBC # FLD: 14.1 %
SODIUM SERPL-SCNC: 138 MMOL/L
WBC # FLD AUTO: 7.58 K/UL

## 2023-04-16 ENCOUNTER — TRANSCRIPTION ENCOUNTER (OUTPATIENT)
Age: 63
End: 2023-04-16

## 2023-04-17 ENCOUNTER — TRANSCRIPTION ENCOUNTER (OUTPATIENT)
Age: 63
End: 2023-04-17

## 2023-04-18 NOTE — H&P ADULT - NSHPPHYSICALEXAM_GEN_ALL_CORE
This note was generated with [employer] voice recognition software. I apologize for any possible typographical errors.        Subjective:       Patient ID:  Shelby is a 72 y.o. female being seen for follow up 6 weeks.       Chief Complaint: Follow-up-  3  months. NO hospital of ED visit since last visit.        71 yo F with multiple myeloma (follows with Heme/Onc, s/p stem cell transplant Feb 2020), T2DM, thrombocytopenia, depression, drug induced neuropathy, chronic diastolic heart failure, hx of stroke (one in 2008, one in 2011) with hemiplegia on right side- using cane , HLD, anemia, colon polyps who presents for follow up. Prior smoker, quit in 2011.  She is accompanied by family member.    Oncologic History: Last seen by Hem/onc 3/24/2023.       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  She achieved and tolerated well VRd x completing 7, 28 day cycles and achieving deep VGPR to induction. Then underwent Melphalan autologous stem cell transplant on 2/12/2020.   She saw Onc NP last month-3/24/2023-  note reviewed-some below- they have her set up to see Neurology in MARCH 2023)  she  demonstrated mild biochemical progression over summer 2022 studies with no CRAB, in light of this and high risk CG   we transitioned her therapy from Sravanthi/Velcade to Sravanthi/pomalyst; intolerant to dex.   - biochemical studies from 2/24/23 cw with continued ME so plan to continue sravanthi/pom until intolerance or progression. Today's myeloma labs pending.   Pomalyst 4 mg dose adjusted to 3 mg due to worsening cytopenia(12/22/23).  Cytopenia improved, patient tolerating so continue current dose    -recommended she take prn imodium for occasional loose stool with pomalyst   -already on asa 81mg   -post  transplant vaccines completed   Next treatment 4/24/2023.      Anemia due to MM and chemo-- 7.7/28.7-- WBC 2.30.    Plt 183. receiving venofer now.       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    -Stable   - continues gabapentin and prn tramadol      SHe has diabetes.  Recent hgb A1c 5.7. on metformin.  No retinopathy-- going to need cataract surgery.     Htn on amlodipine and valsartan  on repatha for Hyperlipidemia due to statin intolerance.       She has COPD-- has nt been using her inhalers.  She has not had to use home o2 recently.  SHe has avoided URI and pneumonias this winter.              Fatigue: less fatigue than before-- doing pretty good per her ( she is still getting tired a lot per her friend) ,   Increased appetite from before, eats 3 meals per day, consuming ice cream about 3x/week . Sometimes will eat only 2 meals if she is sleeping.  Weight loss/gain:wt down 3 #   since last visit.  Things don't taste like they use to.  SHe has ensure but does not drink it all the time.             Wt is down to 143 #--         Right hand twitching, chronic.   Still able to cook.  helps with med administration and keeping track of blood sugars.   Elevates feet for leg swelling, uses compression stockings.            HLD with Statin Intolerance: on praluent, PCSK9 inhibitor-- lipid panel form 1/23 reviewed      Depression-- having hallucinations but less -- sees lady sitting on step or a man standing by the truck,.  She is getting confused.  NO SI or HI>  She is suppose to be on zofloft but she forgot this and has not tired this yet.                Review of Systems   Constitutional: Negative for fever. Weight i9s down 4 #.    HENT: Negative for sore throat.    Eyes: Negative for blurred vision.   Respiratory: Negative for cough and shortness of breath.    Cardiovascular: Positive for leg swelling. Negative for chest pain.   Gastrointestinal: Negative for abdominal pain, constipation, diarrhea, nausea and vomiting.    Genitourinary: Negative for dysuria.   Musculoskeletal: Positive for myalgias.   Neurological: Negative for headaches.   Psychiatric/Behavioral: Positive confusion and she has hallucinations at times.  .               Patient Active Problem List   Diagnosis    Personal history of malignant neoplasm of breast    Mononeuritis of upper limb    Hyperlipemia    Gastropathy    Tremor    Nuclear sclerosis    Open angle with borderline findings and low glaucoma risk in both eyes    Thoracic aortic aneurysm without rupture    Aortic atherosclerosis    History of CVA with residual deficit    Osteopenia    PAD (peripheral artery disease)    Liver mass    Recurrent major depressive disorder, in full remission    Statin intolerance    Dysarthria as late effect of cerebrovascular accident (CVA)    Pulmonary heart disease    Ectatic aorta    Tortuous aorta    Acute respiratory failure with hypoxia    Budd-Chiari syndrome    PVC (premature ventricular contraction)    Multiple myeloma in relapse    Metastatic multiple myeloma to bone    Status post autologous bone marrow transplant    Abnormal PFT    Infection due to human metapneumovirus (hMPV)    Iron deficiency anemia due to chronic blood loss    History of colon polyps    Drug-induced polyneuropathy    Essential hypertension    Type 2 diabetes mellitus without complication, without long-term current use of insulin    Multiple myeloma in remission    Anemia in neoplastic disease    Hemiplegia and hemiparesis following cerebral infarction affecting right dominant side    Abnormal findings on diagnostic imaging of liver and biliary tract     Type 2 diabetes mellitus with diabetic peripheral angiopathy without gangrene, with long-term current use of insulin    History of auto stem cell transplant    Community acquired pneumonia of right lower lobe of lung    Thrombocytopenia, unspecified    Chronic obstructive pulmonary disease, unspecified COPD type    Mild neurocognitive  LOS:     VITALS:   T(C): 36.4 (03-18-23 @ 00:40), Max: 36.4 (03-18-23 @ 00:40)  HR: 70 (03-18-23 @ 08:21) (66 - 78)  BP: 129/77 (03-18-23 @ 08:21) (111/67 - 140/64)  RR: 17 (03-18-23 @ 08:21) (17 - 18)  SpO2: 98% (03-18-23 @ 08:21) (97% - 99%)    GENERAL: NAD, lying in bed comfortably  HEAD:  Atraumatic, Normocephalic  EYES: EOMI, PERRLA, conjunctiva and sclera clear  ENT: Moist mucous membranes  NECK: Supple, No JVD  CHEST/LUNG: Clear to auscultation bilaterally; No rales, rhonchi, wheezing, or rubs. Unlabored respirations  HEART: Regular rate and rhythm; No murmurs, rubs, or gallops  ABDOMEN: BSx4; Soft, nontender, nondistended  EXTREMITIES:  2+ Peripheral Pulses, brisk capillary refill. No clubbing, cyanosis, or edema  NERVOUS SYSTEM:  A&Ox3, no focal deficits   SKIN: No rashes or lesions "disorder due to multiple etiologies             Objective:          /74 (BP Location: Left arm, Patient Position: Sitting, BP Method: Medium (Manual))   Pulse (!) 51   Ht 5' 2" (1.575 m)   Wt 65.2 kg (143 lb 11.8 oz)   LMP  (LMP Unknown)   SpO2 95%   BMI 26.29 kg/m²        Recheck bp 124/80     Physical Exam  Off o2- O2 95 %.    Constitutional:       Appearance: Normal appearance.      Comments: Pleasant, in good spirits   HENT:      Head: Normocephalic and atraumatic.      Nose: Nose normal.      Mouth/Throat:      Mouth: Mucous membranes are moist.   Eyes:      General: No scleral icterus.     Conjunctiva/sclera: Conjunctivae normal.   Cardiovascular:      Rate and Rhythm: Normal rate and regular rhythm.      Pulses: Normal pulses.      Heart sounds: Normal heart sounds. No murmur heard.  Pulmonary:      Effort: Pulmonary effort is normal. No respiratory distress.      Breath sounds: Normal breath sounds. No wheezing or rales.   Abdominal:      General: Abdomen is flat. There is no distension.      Palpations: Abdomen is soft.      Tenderness: There is no abdominal tenderness. There is no guarding.   Musculoskeletal:         General: No swelling, tenderness or deformity.      Cervical back: Normal range of motion. No rigidity.      Right lower leg: No edema.      Left lower leg: No edema.    twitchy right arms--         Skin:     General: Skin is warm and dry.      Coloration: Skin is not jaundiced.   Neurological:      General: No focal deficit present.      Mental Status: She is alert and oriented to person, place, and time.   Psychiatric:         Mood and Affect: Mood normal.         Behavior: Behavior normal.     Protective Sensation (w/ 10 gram monofilament):  Right: Decreased  Left: Decreased    Visual Inspection:  Normal -  Bilateral    Pedal Pulses:   Right: Present  Left: Present    Posterior Tibialis Pulses:   Right:Present  Left: Present               Assessment and Plan:             Type 2 " diabetes mellitus without complication- doing well.       Multiple myeloma - being treated-- following with ONC-      Metastatic multiple myeloma to bone     Pulmonary heart disease-- o2 sats ok-- off O2 - still use occassionally       Type 2 diabetes mellitus with diabetic peripheral angiopathy without gangrene, with long-term current use of insulin-- doing fine      Thrombocytopenia, unspecified-- stable-  plt count 183- onc monitoring             Anemia in neoplastic disease     History of auto stem cell transplant     Chronic obstructive pulmonary disease, unspecified COPD type-- doing well      From CT on 4/25/2022  Was reviewed        Stable appearance of saccular aneurysmal dilatation at the distal thoracic aorta near the level of diaphragm with associated mural thrombus not significantly changed.  Reviewed Vascular note          Plan:     She has COPD with a lot of emphysematous changes of her lungs and she has pulmonary heart disease.  I believe the oxygen would be beneficial to work to decrease her risk of right heart failure..  Last visit suggested trial at home and see it this helped.   She thinks it helps her confusion.       On review of her recent ct of her chest/abdomen --  saccular aneurysm she has in her thoracic aorta - stable at 4.5 cm - and the lung consolidation has resolved.   .     Weight lost graduate-- suggest ensure daily with 2 regular meals, daibetes is bell controled-- discussed ensure.      I will see her back again in 3 months or sooner if needed.      Meds refilled      VITALS:   T(C): 36.4 (03-18-23 @ 00:40), Max: 36.4 (03-18-23 @ 00:40)  HR: 70 (03-18-23 @ 08:21) (66 - 78)  BP: 129/77 (03-18-23 @ 08:21) (111/67 - 140/64)  RR: 17 (03-18-23 @ 08:21) (17 - 18)  SpO2: 98% (03-18-23 @ 08:21) (97% - 99%)    GENERAL: NAD, lying in bed comfortably  HEAD:  Atraumatic, Normocephalic  EYES:  conjunctiva and sclera clear  CHEST/LUNG: mild crackles in bilateral lung bases  HEART: Regular rate and rhythm; No murmurs, rubs, or gallops  ABDOMEN: BSx4; Soft, nontender, nondistended  EXTREMITIES:  no LE edema   NERVOUS SYSTEM:  A&Ox3, no focal deficits

## 2023-04-20 ENCOUNTER — RESULT REVIEW (OUTPATIENT)
Age: 63
End: 2023-04-20

## 2023-04-20 ENCOUNTER — OUTPATIENT (OUTPATIENT)
Dept: OUTPATIENT SERVICES | Facility: HOSPITAL | Age: 63
LOS: 1 days | End: 2023-04-20
Payer: MEDICAID

## 2023-04-20 DIAGNOSIS — R91.8 OTHER NONSPECIFIC ABNORMAL FINDING OF LUNG FIELD: ICD-10-CM

## 2023-04-20 PROCEDURE — 71046 X-RAY EXAM CHEST 2 VIEWS: CPT | Mod: 26

## 2023-04-20 PROCEDURE — 71046 X-RAY EXAM CHEST 2 VIEWS: CPT

## 2023-04-21 ENCOUNTER — TRANSCRIPTION ENCOUNTER (OUTPATIENT)
Age: 63
End: 2023-04-21

## 2023-04-21 DIAGNOSIS — R91.8 OTHER NONSPECIFIC ABNORMAL FINDING OF LUNG FIELD: ICD-10-CM

## 2023-04-22 ENCOUNTER — TRANSCRIPTION ENCOUNTER (OUTPATIENT)
Age: 63
End: 2023-04-22

## 2023-04-23 ENCOUNTER — TRANSCRIPTION ENCOUNTER (OUTPATIENT)
Age: 63
End: 2023-04-23

## 2023-04-24 ENCOUNTER — APPOINTMENT (OUTPATIENT)
Dept: CARDIOTHORACIC SURGERY | Facility: CLINIC | Age: 63
End: 2023-04-24
Payer: MEDICAID

## 2023-04-24 VITALS
HEART RATE: 61 BPM | HEIGHT: 66 IN | RESPIRATION RATE: 14 BRPM | SYSTOLIC BLOOD PRESSURE: 124 MMHG | OXYGEN SATURATION: 98 % | DIASTOLIC BLOOD PRESSURE: 80 MMHG

## 2023-04-24 PROCEDURE — 99024 POSTOP FOLLOW-UP VISIT: CPT

## 2023-04-24 NOTE — REASON FOR VISIT
[de-identified] : CABG x 4  [de-identified] : 3/23/2023 [de-identified] :   Here for postop F/U\par \par The patient is a 62-year-old lady who underwent urgent coronary artery bypass grafting x4 on 3/23/2023 when she was admitted with critical unstable angina and severe diffuse triple-vessel coronary artery disease with severe COPD, morbid obesity and more than a 75-pack-year history of smoking.\par \par She did very well in the postoperative.  And tells me that she has had follow-up with her medical doctor and cardiologist and overall seems to be progressing very well.\par \par She has apparently stopped smoking and has made a significant attempt to lose some weight and tells me that this is the best she has felt in a very long time.\par \par She denies chest pain or shortness of breath and overall has no complaints.\par

## 2023-04-24 NOTE — PHYSICAL EXAM
[Respiration, Rhythm And Depth] : normal respiratory rhythm and effort [Heart Rate And Rhythm] : heart rate was normal and rhythm regular [Clean] : clean [Dry] : dry [Healing Well] : healing well [No Edema] : no edema [FreeTextEntry1] : Sternum is stable and is healed very well

## 2023-04-24 NOTE — ASSESSMENT
[FreeTextEntry1] : 63 y/o female with PMHx of HTN, HLD, COPD not on home O2, pre-DM, active smoker (greater than 75 pack years) presented to the ED for chest tightness since 9am yesterday. She reports after breakfast she sat on the couch watching tv, when she suddenly developed substernal chest tightness with SOB and flushing. She got up to get her inhalers when she became dizzy prompting her ED visit yesterday. She described the chest tightness as non-radiating, substernal, improves on ambulation, worse on rest. She denies any similar episodes in the past. Denies any fevers, chills, abdominal pain, nausea, vomiting, diarrhea,constipation, LE swelling. Never had stress test before, no cardiac cath done. FH+ father  from MI at age 60. Today, patient underwent cardiac cath via right wrist which revealed left main disease, calcified LAD (80%), CX (90%), & RCA (60%)m and severe RPDA disease. CT surgery was consulted for possible myocardial revascularization via CABG. On 3/23/23 patient underwent a CABG x 4 and was treated with ABX for a UTI.  She otherwise had an uneventful hospital course and was discharged home in stable condition on POD 6. Here for post op visit. \par \par Plan:\par Overall pt states she is doing well, denies CP, SOB, Palpitations\par Walking daily and using IS \par She also states her VSS and FS have been stable at home\par Given Percocet once daily x 5 days informed she will no longer receive after today - she will need to f/u with pain management \par \par F/U PMD for routine care\par F/U Cardio for continued management Dr. Erlin Santa \par F/U CTS PRN \par \par Francine IRAHETA [facility], am acting as scribe for Dr. Chris \par \par

## 2023-04-26 ENCOUNTER — APPOINTMENT (OUTPATIENT)
Dept: CARDIOLOGY | Facility: CLINIC | Age: 63
End: 2023-04-26
Payer: MEDICAID

## 2023-04-26 ENCOUNTER — TRANSCRIPTION ENCOUNTER (OUTPATIENT)
Age: 63
End: 2023-04-26

## 2023-04-26 ENCOUNTER — RESULT CHARGE (OUTPATIENT)
Age: 63
End: 2023-04-26

## 2023-04-26 VITALS
HEIGHT: 66 IN | HEART RATE: 76 BPM | DIASTOLIC BLOOD PRESSURE: 60 MMHG | BODY MASS INDEX: 32.14 KG/M2 | WEIGHT: 200 LBS | SYSTOLIC BLOOD PRESSURE: 102 MMHG

## 2023-04-26 PROCEDURE — 93000 ELECTROCARDIOGRAM COMPLETE: CPT

## 2023-04-26 PROCEDURE — 99214 OFFICE O/P EST MOD 30 MIN: CPT | Mod: 25

## 2023-04-26 NOTE — PHYSICAL EXAM
[No Acute Distress] : no acute distress [No Respiratory Distress] : no respiratory distress  [Soft] : abdomen soft [Non Tender] : non-tender [Normal Gait] : normal gait [Normal] : no edema, no cyanosis, no clubbing, no varicosities [Moves all extremities] : moves all extremities [Alert and Oriented] : alert and oriented

## 2023-04-26 NOTE — ASSESSMENT
[FreeTextEntry1] : 1)CAD Triple vessel disease s/p CABG , normal EF at baseline\par recovering well, complain of mild COOL\par will do echo\par continue asa statin and BB\par explained importance to take medication and healthy diet \par will benefit from home care\par \par \par 2)HTN controlled \par \par 3)DL continue statin\par \par \par Follow up in 2-3 months.

## 2023-04-26 NOTE — REVIEW OF SYSTEMS
[SOB] : no shortness of breath [Chest Discomfort] : no chest discomfort [Lower Ext Edema] : no extremity edema [Leg Claudication] : no intermittent leg claudication [Palpitations] : no palpitations [Syncope] : no syncope [Negative] : Psychiatric

## 2023-04-26 NOTE — HISTORY OF PRESENT ILLNESS
[FreeTextEntry1] : Ms Bucio is a 62 year old female patient with PMHx of HTN, DL COPD pre DM, was recently hospitalized 3/2023 to [facility] for chest pain , abnormal stress test , underwent cardiac cath showed triple vessel disease s/p CABG with LIMA to LAD, SVG's to OM and RPDA. \par Currently patient is stable, recovering well, denies active chest pain, complains of mild dyspnea on exertion , no active sob at rest, no palpitations no syncope. \par \par BP stable \par EKG showed sinus rhyhtm, T wave changes likely post surgery.

## 2023-04-27 ENCOUNTER — TRANSCRIPTION ENCOUNTER (OUTPATIENT)
Age: 63
End: 2023-04-27

## 2023-04-28 ENCOUNTER — TRANSCRIPTION ENCOUNTER (OUTPATIENT)
Age: 63
End: 2023-04-28

## 2023-05-10 ENCOUNTER — EMERGENCY (EMERGENCY)
Facility: HOSPITAL | Age: 63
LOS: 0 days | Discharge: HOME | End: 2023-05-10
Attending: EMERGENCY MEDICINE
Payer: MEDICAID

## 2023-05-10 VITALS
DIASTOLIC BLOOD PRESSURE: 80 MMHG | HEART RATE: 73 BPM | RESPIRATION RATE: 18 BRPM | TEMPERATURE: 98 F | WEIGHT: 199.96 LBS | SYSTOLIC BLOOD PRESSURE: 126 MMHG | HEIGHT: 66 IN | OXYGEN SATURATION: 100 %

## 2023-05-10 DIAGNOSIS — M79.661 PAIN IN RIGHT LOWER LEG: ICD-10-CM

## 2023-05-10 DIAGNOSIS — Z87.891 PERSONAL HISTORY OF NICOTINE DEPENDENCE: ICD-10-CM

## 2023-05-10 DIAGNOSIS — R07.89 OTHER CHEST PAIN: ICD-10-CM

## 2023-05-10 DIAGNOSIS — R06.02 SHORTNESS OF BREATH: ICD-10-CM

## 2023-05-10 DIAGNOSIS — Z79.82 LONG TERM (CURRENT) USE OF ASPIRIN: ICD-10-CM

## 2023-05-10 DIAGNOSIS — I25.10 ATHEROSCLEROTIC HEART DISEASE OF NATIVE CORONARY ARTERY WITHOUT ANGINA PECTORIS: ICD-10-CM

## 2023-05-10 DIAGNOSIS — Z95.1 PRESENCE OF AORTOCORONARY BYPASS GRAFT: ICD-10-CM

## 2023-05-10 DIAGNOSIS — E78.00 PURE HYPERCHOLESTEROLEMIA, UNSPECIFIED: ICD-10-CM

## 2023-05-10 DIAGNOSIS — I10 ESSENTIAL (PRIMARY) HYPERTENSION: ICD-10-CM

## 2023-05-10 LAB
ALBUMIN SERPL ELPH-MCNC: 4.4 G/DL — SIGNIFICANT CHANGE UP (ref 3.5–5.2)
ALP SERPL-CCNC: 110 U/L — SIGNIFICANT CHANGE UP (ref 30–115)
ALT FLD-CCNC: 11 U/L — SIGNIFICANT CHANGE UP (ref 0–41)
ANION GAP SERPL CALC-SCNC: 11 MMOL/L — SIGNIFICANT CHANGE UP (ref 7–14)
AST SERPL-CCNC: 18 U/L — SIGNIFICANT CHANGE UP (ref 0–41)
BASOPHILS # BLD AUTO: 0.02 K/UL — SIGNIFICANT CHANGE UP (ref 0–0.2)
BASOPHILS NFR BLD AUTO: 0.5 % — SIGNIFICANT CHANGE UP (ref 0–1)
BILIRUB SERPL-MCNC: 0.5 MG/DL — SIGNIFICANT CHANGE UP (ref 0.2–1.2)
BUN SERPL-MCNC: 8 MG/DL — LOW (ref 10–20)
CALCIUM SERPL-MCNC: 10 MG/DL — SIGNIFICANT CHANGE UP (ref 8.4–10.5)
CHLORIDE SERPL-SCNC: 106 MMOL/L — SIGNIFICANT CHANGE UP (ref 98–110)
CO2 SERPL-SCNC: 26 MMOL/L — SIGNIFICANT CHANGE UP (ref 17–32)
CREAT SERPL-MCNC: 0.8 MG/DL — SIGNIFICANT CHANGE UP (ref 0.7–1.5)
EGFR: 82 ML/MIN/1.73M2 — SIGNIFICANT CHANGE UP
EOSINOPHIL # BLD AUTO: 0.31 K/UL — SIGNIFICANT CHANGE UP (ref 0–0.7)
EOSINOPHIL NFR BLD AUTO: 7.4 % — SIGNIFICANT CHANGE UP (ref 0–8)
GLUCOSE SERPL-MCNC: 90 MG/DL — SIGNIFICANT CHANGE UP (ref 70–99)
HCT VFR BLD CALC: 35.4 % — LOW (ref 37–47)
HGB BLD-MCNC: 11.3 G/DL — LOW (ref 12–16)
IMM GRANULOCYTES NFR BLD AUTO: 0.2 % — SIGNIFICANT CHANGE UP (ref 0.1–0.3)
LYMPHOCYTES # BLD AUTO: 1.62 K/UL — SIGNIFICANT CHANGE UP (ref 1.2–3.4)
LYMPHOCYTES # BLD AUTO: 38.8 % — SIGNIFICANT CHANGE UP (ref 20.5–51.1)
MAGNESIUM SERPL-MCNC: 1.9 MG/DL — SIGNIFICANT CHANGE UP (ref 1.8–2.4)
MCHC RBC-ENTMCNC: 28.9 PG — SIGNIFICANT CHANGE UP (ref 27–31)
MCHC RBC-ENTMCNC: 31.9 G/DL — LOW (ref 32–37)
MCV RBC AUTO: 90.5 FL — SIGNIFICANT CHANGE UP (ref 81–99)
MONOCYTES # BLD AUTO: 0.33 K/UL — SIGNIFICANT CHANGE UP (ref 0.1–0.6)
MONOCYTES NFR BLD AUTO: 7.9 % — SIGNIFICANT CHANGE UP (ref 1.7–9.3)
NEUTROPHILS # BLD AUTO: 1.88 K/UL — SIGNIFICANT CHANGE UP (ref 1.4–6.5)
NEUTROPHILS NFR BLD AUTO: 45.2 % — SIGNIFICANT CHANGE UP (ref 42.2–75.2)
NRBC # BLD: 0 /100 WBCS — SIGNIFICANT CHANGE UP (ref 0–0)
NT-PROBNP SERPL-SCNC: 223 PG/ML — SIGNIFICANT CHANGE UP (ref 0–300)
PLATELET # BLD AUTO: 236 K/UL — SIGNIFICANT CHANGE UP (ref 130–400)
PMV BLD: 11.8 FL — HIGH (ref 7.4–10.4)
POTASSIUM SERPL-MCNC: 4.5 MMOL/L — SIGNIFICANT CHANGE UP (ref 3.5–5)
POTASSIUM SERPL-SCNC: 4.5 MMOL/L — SIGNIFICANT CHANGE UP (ref 3.5–5)
PROT SERPL-MCNC: 7.7 G/DL — SIGNIFICANT CHANGE UP (ref 6–8)
RBC # BLD: 3.91 M/UL — LOW (ref 4.2–5.4)
RBC # FLD: 15.2 % — HIGH (ref 11.5–14.5)
SODIUM SERPL-SCNC: 143 MMOL/L — SIGNIFICANT CHANGE UP (ref 135–146)
TROPONIN T SERPL-MCNC: <0.01 NG/ML — SIGNIFICANT CHANGE UP
WBC # BLD: 4.17 K/UL — LOW (ref 4.8–10.8)
WBC # FLD AUTO: 4.17 K/UL — LOW (ref 4.8–10.8)

## 2023-05-10 PROCEDURE — 36415 COLL VENOUS BLD VENIPUNCTURE: CPT

## 2023-05-10 PROCEDURE — 99285 EMERGENCY DEPT VISIT HI MDM: CPT | Mod: 25

## 2023-05-10 PROCEDURE — 84484 ASSAY OF TROPONIN QUANT: CPT

## 2023-05-10 PROCEDURE — 99285 EMERGENCY DEPT VISIT HI MDM: CPT

## 2023-05-10 PROCEDURE — 71045 X-RAY EXAM CHEST 1 VIEW: CPT

## 2023-05-10 PROCEDURE — 83880 ASSAY OF NATRIURETIC PEPTIDE: CPT

## 2023-05-10 PROCEDURE — 85025 COMPLETE CBC W/AUTO DIFF WBC: CPT

## 2023-05-10 PROCEDURE — 71045 X-RAY EXAM CHEST 1 VIEW: CPT | Mod: 26

## 2023-05-10 PROCEDURE — 93308 TTE F-UP OR LMTD: CPT | Mod: 26

## 2023-05-10 PROCEDURE — 93010 ELECTROCARDIOGRAM REPORT: CPT

## 2023-05-10 PROCEDURE — 80053 COMPREHEN METABOLIC PANEL: CPT

## 2023-05-10 PROCEDURE — 93005 ELECTROCARDIOGRAM TRACING: CPT

## 2023-05-10 PROCEDURE — 93308 TTE F-UP OR LMTD: CPT

## 2023-05-10 PROCEDURE — 83735 ASSAY OF MAGNESIUM: CPT

## 2023-05-10 NOTE — ED PROVIDER NOTE - PATIENT PORTAL LINK FT
You can access the FollowMyHealth Patient Portal offered by [facility] by registering at the following website: [email or web address]. By joining [facility]’s FollowMyHealth portal, you will also be able to view your health information using other applications (apps) compatible with our system.

## 2023-05-10 NOTE — ED ADULT NURSE NOTE - NSFALLUNIVINTERV_ED_ALL_ED
Bed/Stretcher in lowest position, wheels locked, appropriate side rails in place/Call bell, personal items and telephone in reach/Instruct patient to call for assistance before getting out of bed/chair/stretcher/Non-slip footwear applied when patient is off stretcher/[place] to call system/Physically safe environment - no spills, clutter or unnecessary equipment/Purposeful proactive rounding/Room/bathroom lighting operational, light cord in reach

## 2023-05-10 NOTE — PROGRESS NOTE ADULT - SUBJECTIVE AND OBJECTIVE BOX
OPERATIVE PROCEDURE(s):                POD # 48 CABG                       62yFemale  SURGEON(s): DEYVI Chris  SUBJECTIVE ASSESSMENT:     63 y/o female with PMHx of HTN, HLD, COPD not on home O2, pre-DM, active smoker (greater than 75 pack years) presented to the ED for chest tightness 2023.devon 9am yesterday. FH+ father  from MI at age 58. Cardiac cath via right wrist which revealed left main disease, calcified LAD (80%), CX (90%), & RCA (60%)m and severe RPDA disease. On 3/23/23 patient underwent a CABG x 4 and was treated with ABX for a UTI. Discharged home in stable condition on POD 6.    Presents today because of continued generalized chest pain, exacerbated by movement. No fever, little relief from Tylenol. Admits to doing strenuous activity recently.       Vital Signs Last 24 Hrs  T(F): 97.8 (10 May 2023 09:11), Max: 97.8 (10 May 2023 09:11)  HR: 73 (10 May 2023 09:11) (73 - 73)  BP: 126/80 (10 May 2023 09:11) (126/80 - 126/80)  RR: 18 (10 May 2023 09:11) (18 - 18)  SpO2: 100% (10 May 2023 09:11) (100% - 100%)      Physical Exam:  General: NAD; A&Ox3  Cardiac: S1/S2, RRR, no murmur, no rubs  Lungs: unlabored shallow respirations, bilateral bs decreased at bases  Abdomen: Soft/NT/protuberant  Sternum: Intact, no click, incision healing well with no drainage  Incisions: Incisions clean/dry/intact  Extremities: No edema b/l lower extremities      LABS:                        11.3<L>  4.17<L> )-----------( 236      ( 10 May 2023 10:14 )             35.4<L>    05-10    143  |  106  |  8<L>  ----------------------------<  90  4.5   |  26  |  0.8    Ca    10.0      10 May 2023 10:14  Mg     1.9     05-10    TPro  7.7 [6.0 - 8.0]  /  Alb  4.4 [3.5 - 5.2]  /  TBili  0.5 [0.2 - 1.2]  /  DBili  x   /  AST  18 [0 - 41]  /  ALT  11 [0 - 41]  /  AlkPhos  110 [30 - 115]  05-10      RADIOLOGY & ADDITIONAL TESTS:  CXR: < from: Xray Chest 1 View AP/PA (05.10.23 @ 10:23) >  Blunting of left costophrenic angle with right lower lobe opacity,   unchanged.    < end of copied text >  < from: 12 Lead ECG (05.10.23 @ 09:18) >  Ventricular Rate 70 BPM    Atrial Rate 70 BPM    P-R Interval 180 ms    QRS Duration 94 ms    Q-T Interval 440 ms    QTC Calculation(Bazett) 475 ms    P Axis 72 degrees    R Axis 52 degrees    T Axis 119 degrees    Diagnosis Line Normal sinus rhythm  Nonspecific T wave abnormality  Abnormal ECG    < end of copied text >    EKG:  MEDICATIONS  (STANDING):    MEDICATIONS  (PRN):    Allergies    No Known Allergies    Intolerances      Patient seen today with Dr. Chris,   She is energetic looks much better from when she was discharged  No sternal click  appears discomfort is musculoskeletal related    No CTS intervention at current time warranted   Continue with Tylenol or Ibuprofen as needed    Care discussed with ED team

## 2023-05-10 NOTE — ED PROVIDER NOTE - CARE PROVIDER_API CALL
Erlin Santa)  Cardiovascular Disease; Internal Medicine  [address] [place] [address]  Phone: [phone]  Fax: [phone]  Follow Up Time: Routine

## 2023-05-10 NOTE — ED PROVIDER NOTE - OBJECTIVE STATEMENT
63 yo f with pmh htn and hld, CAD s/p CABGx4, presents with R sided chest pain and sob since surgery in March.  pt says she has followed up with CT surg outpt after surgery and was told healing fine, but pt says pain has persisted so she is worried.  pt denies fever, chills, n/v/d, abd pain.  continues to have RLE pain from the harvest site.  ct surg is dr. walker.  cardio is dr. grover

## 2023-05-10 NOTE — ED PROVIDER NOTE - PHYSICAL EXAMINATION
VITAL SIGNS: I have reviewed nursing notes and confirm.  CONSTITUTIONAL: Well-developed; well-nourished; in no acute distress.  SKIN: Skin exam is warm and dry, no acute rash.  HEAD: Normocephalic; atraumatic.  EYES: PERRL, EOM intact; conjunctiva and sclera clear.  ENT: No nasal discharge; airway clear. TMs clear.  NECK: Supple; non tender.  CARD: S1, S2 normal; no murmurs, gallops, or rubs. Regular rate and rhythm. sternotomy site c/d/i healing well, ttp  RESP: No wheezes, rales or rhonchi.  ABD: Normal bowel sounds; soft; non-distended; non-tender;  EXT: Normal ROM. No clubbing, cyanosis or edema. RLE medial harvest site with incision site still slightly open, but no drainage, no erythema, ttp along the medial area  PSYCH: Cooperative, appropriate.

## 2023-05-10 NOTE — ED PROVIDER NOTE - IV ALTEPLASE ADMIN OUTSIDE HIDDEN
----- Message from Geoffrey Red sent at 5/3/2019  8:18 AM CDT -----  Contact: Self   Pt wants to get knee injection. Wants to know if it is time.     Contact # [phone]  
Left  for pt advised pt that he was ready for another Euflexxa injection and order will be put in on Monday. Will call pt back with appts.  
show

## 2023-05-10 NOTE — ED PROVIDER NOTE - CLINICAL SUMMARY MEDICAL DECISION MAKING FREE TEXT BOX
Pt with R chest pain and sob since CABGx4, labs and imaging reassuring.  seen by CT surg in ED, cleared for dc.  pt to f/u with cardio outpt.  Any ordered labs and EKG were reviewed.  Any imaging was ordered and reviewed by me.  Appropriate medications for patient's presenting complaints were ordered and effects were reassessed.  Patient's records (prior hospital, ED visit, and/or nursing home notes if available) were reviewed.  Additional history was obtained from EMS, family, and/or PCP (where available).  Escalation to admission/observation was considered.  1) However patient feels much better and is comfortable with discharge.  Appropriate follow-up was arranged.

## 2023-05-10 NOTE — ED ADULT NURSE NOTE - OBJECTIVE STATEMENT
Patient presents to ED for c/o R sided chest pain x2 weeks. Patient states sharp intermittent CP. S/p open heart sx in March. A&O x4.

## 2023-05-30 ENCOUNTER — RX RENEWAL (OUTPATIENT)
Age: 63
End: 2023-05-30

## 2023-06-01 ENCOUNTER — APPOINTMENT (OUTPATIENT)
Dept: CARDIOLOGY | Facility: CLINIC | Age: 63
End: 2023-06-01
Payer: MEDICAID

## 2023-06-01 PROCEDURE — 93306 TTE W/DOPPLER COMPLETE: CPT

## 2023-06-28 ENCOUNTER — APPOINTMENT (OUTPATIENT)
Dept: CARDIOLOGY | Facility: CLINIC | Age: 63
End: 2023-06-28
Payer: MEDICAID

## 2023-06-28 VITALS
DIASTOLIC BLOOD PRESSURE: 80 MMHG | HEART RATE: 64 BPM | HEIGHT: 66 IN | SYSTOLIC BLOOD PRESSURE: 128 MMHG | WEIGHT: 206 LBS | BODY MASS INDEX: 33.11 KG/M2

## 2023-06-28 PROCEDURE — 99213 OFFICE O/P EST LOW 20 MIN: CPT | Mod: 25

## 2023-06-28 PROCEDURE — 93000 ELECTROCARDIOGRAM COMPLETE: CPT

## 2023-06-28 RX ORDER — METOPROLOL TARTRATE 25 MG/1
25 TABLET, FILM COATED ORAL
Qty: 60 | Refills: 4 | Status: DISCONTINUED | COMMUNITY
Start: 2023-03-30 | End: 2023-06-28

## 2023-06-28 RX ORDER — ASPIRIN 325 MG/1
325 TABLET ORAL
Qty: 30 | Refills: 0 | Status: DISCONTINUED | COMMUNITY
Start: 2023-03-30 | End: 2023-06-28

## 2023-06-30 NOTE — ASSESSMENT
[FreeTextEntry1] : 1)CAD Triple vessel disease s/p CABG , normal EF at baseline\par recovering well, feeling better \par echo normal EF no significant valvular disease \par continue asa statin and BB\par explained importance to take medication and healthy diet \par loose weight \par \par \par 2)HTN controlled \par \par 3)DL continue statin\par \par \par Follow up in 6 months.

## 2023-06-30 NOTE — HISTORY OF PRESENT ILLNESS
[FreeTextEntry1] : Ms Bucio is a 62 year old female patient with PMHx of HTN, DL COPD pre DM, was recently hospitalized 3/2023 to [facility] for chest pain , abnormal stress test , underwent cardiac cath showed triple vessel disease s/p CABG with LIMA to LAD, SVG's to OM and RPDA. \par Currently patient is stable, recovering well, denies active chest pain, or sob , no palpitations no syncope. \par \par BP stable \par EKG showed sinus rhythm, T wave changes likely post surgery.

## 2023-08-23 NOTE — ED CDU PROVIDER INITIAL DAY NOTE - NSICDXPASTMEDICALHX_GEN_ALL_CORE_FT
PAST MEDICAL HISTORY:  High cholesterol     Hypertension      PRE-OP DIAGNOSIS:  RADHA on CPAP 23-Aug-2023 19:12:50  Solomon Man  Obesity, morbid 23-Aug-2023 19:11:44  Solomon Man

## 2023-08-29 ENCOUNTER — EMERGENCY (EMERGENCY)
Facility: HOSPITAL | Age: 63
LOS: 0 days | Discharge: HOME | End: 2023-08-30
Attending: EMERGENCY MEDICINE
Payer: MEDICAID

## 2023-08-29 VITALS
HEART RATE: 76 BPM | DIASTOLIC BLOOD PRESSURE: 74 MMHG | RESPIRATION RATE: 18 BRPM | SYSTOLIC BLOOD PRESSURE: 152 MMHG | HEIGHT: 66 IN | WEIGHT: 199.96 LBS | TEMPERATURE: 98 F | OXYGEN SATURATION: 98 %
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
## 2023-08-29 DIAGNOSIS — R07.89 OTHER CHEST PAIN: ICD-10-CM

## 2023-08-29 DIAGNOSIS — Z79.82 LONG TERM (CURRENT) USE OF ASPIRIN: ICD-10-CM

## 2023-08-29 DIAGNOSIS — Z87.891 PERSONAL HISTORY OF NICOTINE DEPENDENCE: ICD-10-CM
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
## 2023-08-29 LAB
ALBUMIN SERPL ELPH-MCNC: 4.2 G/DL — SIGNIFICANT CHANGE UP (ref 3.5–5.2)
ALP SERPL-CCNC: 110 U/L — SIGNIFICANT CHANGE UP (ref 30–115)
ALT FLD-CCNC: 10 U/L — SIGNIFICANT CHANGE UP (ref 0–41)
ANION GAP SERPL CALC-SCNC: 9 MMOL/L — SIGNIFICANT CHANGE UP (ref 7–14)
AST SERPL-CCNC: 16 U/L — SIGNIFICANT CHANGE UP (ref 0–41)
BASOPHILS # BLD AUTO: 0.02 K/UL — SIGNIFICANT CHANGE UP (ref 0–0.2)
BASOPHILS NFR BLD AUTO: 0.4 % — SIGNIFICANT CHANGE UP (ref 0–1)
BILIRUB SERPL-MCNC: 0.3 MG/DL — SIGNIFICANT CHANGE UP (ref 0.2–1.2)
BUN SERPL-MCNC: 17 MG/DL — SIGNIFICANT CHANGE UP (ref 10–20)
CALCIUM SERPL-MCNC: 9.6 MG/DL — SIGNIFICANT CHANGE UP (ref 8.4–10.5)
CHLORIDE SERPL-SCNC: 107 MMOL/L — SIGNIFICANT CHANGE UP (ref 98–110)
CO2 SERPL-SCNC: 25 MMOL/L — SIGNIFICANT CHANGE UP (ref 17–32)
CREAT SERPL-MCNC: 1 MG/DL — SIGNIFICANT CHANGE UP (ref 0.7–1.5)
EGFR: 63 ML/MIN/1.73M2 — SIGNIFICANT CHANGE UP
EOSINOPHIL # BLD AUTO: 0.14 K/UL — SIGNIFICANT CHANGE UP (ref 0–0.7)
EOSINOPHIL NFR BLD AUTO: 2.6 % — SIGNIFICANT CHANGE UP (ref 0–8)
GLUCOSE SERPL-MCNC: 111 MG/DL — HIGH (ref 70–99)
HCT VFR BLD CALC: 35.7 % — LOW (ref 37–47)
HGB BLD-MCNC: 11.7 G/DL — LOW (ref 12–16)
IMM GRANULOCYTES NFR BLD AUTO: 0.2 % — SIGNIFICANT CHANGE UP (ref 0.1–0.3)
LYMPHOCYTES # BLD AUTO: 2.63 K/UL — SIGNIFICANT CHANGE UP (ref 1.2–3.4)
LYMPHOCYTES # BLD AUTO: 48.8 % — SIGNIFICANT CHANGE UP (ref 20.5–51.1)
MCHC RBC-ENTMCNC: 29.4 PG — SIGNIFICANT CHANGE UP (ref 27–31)
MCHC RBC-ENTMCNC: 32.8 G/DL — SIGNIFICANT CHANGE UP (ref 32–37)
MCV RBC AUTO: 89.7 FL — SIGNIFICANT CHANGE UP (ref 81–99)
MONOCYTES # BLD AUTO: 0.45 K/UL — SIGNIFICANT CHANGE UP (ref 0.1–0.6)
MONOCYTES NFR BLD AUTO: 8.3 % — SIGNIFICANT CHANGE UP (ref 1.7–9.3)
NEUTROPHILS # BLD AUTO: 2.14 K/UL — SIGNIFICANT CHANGE UP (ref 1.4–6.5)
NEUTROPHILS NFR BLD AUTO: 39.7 % — LOW (ref 42.2–75.2)
NRBC # BLD: 0 /100 WBCS — SIGNIFICANT CHANGE UP (ref 0–0)
PLATELET # BLD AUTO: 164 K/UL — SIGNIFICANT CHANGE UP (ref 130–400)
PMV BLD: 12.4 FL — HIGH (ref 7.4–10.4)
POTASSIUM SERPL-MCNC: 4 MMOL/L — SIGNIFICANT CHANGE UP (ref 3.5–5)
POTASSIUM SERPL-SCNC: 4 MMOL/L — SIGNIFICANT CHANGE UP (ref 3.5–5)
PROT SERPL-MCNC: 7.1 G/DL — SIGNIFICANT CHANGE UP (ref 6–8)
RBC # BLD: 3.98 M/UL — LOW (ref 4.2–5.4)
RBC # FLD: 16.4 % — HIGH (ref 11.5–14.5)
SODIUM SERPL-SCNC: 141 MMOL/L — SIGNIFICANT CHANGE UP (ref 135–146)
TROPONIN T SERPL-MCNC: <0.01 NG/ML — SIGNIFICANT CHANGE UP
WBC # BLD: 5.39 K/UL — SIGNIFICANT CHANGE UP (ref 4.8–10.8)
WBC # FLD AUTO: 5.39 K/UL — SIGNIFICANT CHANGE UP (ref 4.8–10.8)

## 2023-08-29 PROCEDURE — 85025 COMPLETE CBC W/AUTO DIFF WBC: CPT

## 2023-08-29 PROCEDURE — 71045 X-RAY EXAM CHEST 1 VIEW: CPT

## 2023-08-29 PROCEDURE — 71045 X-RAY EXAM CHEST 1 VIEW: CPT | Mod: 26

## 2023-08-29 PROCEDURE — 93010 ELECTROCARDIOGRAM REPORT: CPT

## 2023-08-29 PROCEDURE — 93005 ELECTROCARDIOGRAM TRACING: CPT

## 2023-08-29 PROCEDURE — 36415 COLL VENOUS BLD VENIPUNCTURE: CPT

## 2023-08-29 PROCEDURE — 99285 EMERGENCY DEPT VISIT HI MDM: CPT | Mod: 25

## 2023-08-29 PROCEDURE — 80053 COMPREHEN METABOLIC PANEL: CPT

## 2023-08-29 PROCEDURE — 84484 ASSAY OF TROPONIN QUANT: CPT

## 2023-08-29 PROCEDURE — 99285 EMERGENCY DEPT VISIT HI MDM: CPT

## 2023-08-29 NOTE — ED ADULT NURSE NOTE - NS ED NURSE DC INFO COMPLEXITY
The patient is a 1m Male complaining of difficulty breathing. Simple: Patient demonstrates quick and easy understanding/Verbalized Understanding

## 2023-08-29 NOTE — ED ADULT NURSE NOTE - OBJECTIVE STATEMENT
presented to ED c/o intermittent chest pain x2 days. pt denies any radiation of pain. pt denies any N/V/D. pt with hx of open heart surgery march 2023. Pt states she lifted a case of water bottle and in unsure if that is what is causing the pain.

## 2023-08-29 NOTE — ED PROVIDER NOTE - PATIENT PORTAL LINK FT
You can access the FollowMyHealth Patient Portal offered by [facility] by registering at the following website: [email or web address]. By joining [facility]’s FollowMyHealth portal, you will also be able to view your health information using other applications (apps) compatible with our system.

## 2023-08-29 NOTE — ED PROVIDER NOTE - DIFFERENTIAL DIAGNOSIS
Differential Diagnosis chest wall pain/costochondritis, ACS (unlikely), PE, no sob, no edema. nsr on ekg.   effusion/pna lungs clear, no fever, no sob.

## 2023-08-29 NOTE — ED PROVIDER NOTE - CARE PLAN
Assessment and plan of treatment:	chest wall/atypical cp  imaging, labs, ekg, supportive care   1 Principal Discharge DX:	Chest wall pain  Assessment and plan of treatment:	chest wall/atypical cp  imaging, labs, ekg, supportive care

## 2023-08-29 NOTE — ED PROVIDER NOTE - WR ORDER NAME 1
Urine for Chlamydia and Gonorrhea sent   Empiric treatment with Ceftriaxone and Azithromycin   Call if not better in 3 days  or immediately if fever.   Patient education completed on disease process, etiology, and prognosis   Partner must obtain treatment if cultures come back positive   No intercourse until all partners are treated and all S&S are resolved (at least 2 weeks)  Condom use strongly recommended  Safe sex discussed   Pt verbalized understanding.  All questions answered        Xray Chest 1 View- PORTABLE-Urgent

## 2023-08-29 NOTE — ED PROVIDER NOTE - OBJECTIVE STATEMENT
63 yo f c/o 2days of sore feeling in mid chest. worse when she lays on her side or moves her torso. better w rest. non exertional. no sob. no dennis. no fever or chills. hx of cabg in March 2023. no leg pain or swelling. 61 yo f c/o 2days of sore feeling in mid chest. sx occurred after lifting a heavy pack of waterbottles. worse when she lays on her side or moves her torso. better w rest. non exertional. no sob. no dennis. no fever or chills. hx of cabg in March 2023. no leg pain or swelling.

## 2023-08-29 NOTE — ED PROVIDER NOTE - PHYSICAL EXAMINATION
VITAL SIGNS: I have reviewed nursing notes and confirm.  CONSTITUTIONAL: Well-developed; well-nourished; in no acute distress.  SKIN: Skin exam is warm and dry, no acute rash.  HEAD: Normocephalic; atraumatic.  EYES: PERRL, EOM intact; conjunctiva and sclera clear.  ENT: No nasal discharge; airway clear.   NECK: Supple; non tender.  CHEST:+ S1, S2 . well healed sternotomy scar.  tender over mid chest wall reproduces the pain.   RESP: No wheezes, rales or rhonchi.  ABD: Normal bowel sounds; soft; non-distended; non-tender;  EXT: Normal ROM. No cyanosis or edema.  LYMPH: No acute adenopathy.  NEURO: Alert. Grossly unremarkable. No focal deficits.  PSYCH: Cooperative, appropriate.

## 2023-12-21 ENCOUNTER — APPOINTMENT (OUTPATIENT)
Dept: CARDIOLOGY | Facility: CLINIC | Age: 63
End: 2023-12-21
Payer: MEDICARE

## 2023-12-21 VITALS
WEIGHT: 220 LBS | DIASTOLIC BLOOD PRESSURE: 70 MMHG | HEIGHT: 66 IN | BODY MASS INDEX: 35.36 KG/M2 | HEART RATE: 66 BPM | SYSTOLIC BLOOD PRESSURE: 130 MMHG

## 2023-12-21 DIAGNOSIS — E11.59 TYPE 2 DIABETES MELLITUS WITH OTHER CIRCULATORY COMPLICATIONS: ICD-10-CM

## 2023-12-21 PROCEDURE — 99214 OFFICE O/P EST MOD 30 MIN: CPT | Mod: 25

## 2023-12-21 PROCEDURE — 93000 ELECTROCARDIOGRAM COMPLETE: CPT

## 2023-12-21 NOTE — PHYSICAL EXAM
[Normal S1, S2] : normal S1, S2 [No Murmur] : no murmur [Normal] : clear lung fields, good air entry, no respiratory distress [Clear Lung Fields] : clear lung fields [No Edema] : no edema [No Rash] : no rash

## 2023-12-22 NOTE — REASON FOR VISIT
[Symptom and Test Evaluation] : symptom and test evaluation [Hyperlipidemia] : hyperlipidemia [Hypertension] : hypertension [Coronary Artery Disease] : coronary artery disease [FreeTextEntry1] : follow up

## 2023-12-22 NOTE — ASSESSMENT
[FreeTextEntry1] : 63 year old female patient with PMHx of HTN, DL COPD, type 2 DM, was recently hospitalized 3/2023 to [facility] for chest pain , abnormal stress test , underwent cardiac cath showed triple vessel disease s/p CABG with LIMA to LAD, SVG's to OM and RPDA.  now c/o positional chest pain, worse on touch and when she turned in bed  chronic musculo skeletal chest pain post sternotomy no evidence of ischemia.   # CAD Triple vessel disease s/p CABG, normal EF at baseline  recovering well, feeling better, still c/o positional MSK chest pain  informed about the importance to avoid NSAIDS and steroids Tylenol PRN for pain  echo normal EF no significant valvular disease continue asa statin and BB explained importance to take medication and healthy diet counseled on weight loss and to avoid high carb diet   # Type 2 DM: - informed about carb consistent diet - A1c 6.7% denies any symptoms except for polyphagia - f/u with PCP for endocrinology referral   # HTN controlled  # DL continue statin  Follow up in 6 months.

## 2023-12-22 NOTE — REVIEW OF SYSTEMS
[Negative] : Integumentary [SOB] : no shortness of breath [Dyspnea on exertion] : not dyspnea during exertion [Chest Discomfort] : no chest discomfort [Leg Claudication] : no intermittent leg claudication [Syncope] : no syncope [FreeTextEntry5] : positional MSK chest pain

## 2023-12-22 NOTE — HISTORY OF PRESENT ILLNESS
[FreeTextEntry1] : 63 year old female patient with PMHx of HTN, DL COPD, type 2 DM, was  hospitalized 3/2023 to [facility] for chest pain , abnormal stress test , underwent cardiac cath showed triple vessel disease s/p CABG with LIMA to LAD, SVG's to OM and RPDA. currently stable  c/o positional chronic chest pain, worse on touch and when she turned in bed.  repeated echo normal EF unremarkable.   Currently patient is stable, recovering well, denies active chest pain, or sob , no palpitations no syncope.

## 2024-01-25 ENCOUNTER — APPOINTMENT (OUTPATIENT)
Dept: PLASTIC SURGERY | Facility: CLINIC | Age: 64
End: 2024-01-25
Payer: MEDICARE

## 2024-01-25 VITALS — WEIGHT: 200 LBS | BODY MASS INDEX: 32.14 KG/M2 | HEIGHT: 66 IN

## 2024-01-25 DIAGNOSIS — M19.049 PRIMARY OSTEOARTHRITIS, UNSPECIFIED HAND: ICD-10-CM

## 2024-01-25 PROCEDURE — 99203 OFFICE O/P NEW LOW 30 MIN: CPT

## 2024-02-07 ENCOUNTER — OUTPATIENT (OUTPATIENT)
Dept: OUTPATIENT SERVICES | Facility: HOSPITAL | Age: 64
LOS: 1 days | End: 2024-02-07
Payer: MEDICARE

## 2024-02-07 ENCOUNTER — RESULT REVIEW (OUTPATIENT)
Age: 64
End: 2024-02-07

## 2024-02-07 DIAGNOSIS — M19.049 PRIMARY OSTEOARTHRITIS, UNSPECIFIED HAND: ICD-10-CM

## 2024-02-07 PROCEDURE — 73130 X-RAY EXAM OF HAND: CPT | Mod: 50

## 2024-02-07 PROCEDURE — 73130 X-RAY EXAM OF HAND: CPT | Mod: 26,50

## 2024-02-08 DIAGNOSIS — M19.049 PRIMARY OSTEOARTHRITIS, UNSPECIFIED HAND: ICD-10-CM

## 2024-02-12 ENCOUNTER — OUTPATIENT (OUTPATIENT)
Dept: OUTPATIENT SERVICES | Facility: HOSPITAL | Age: 64
LOS: 1 days | End: 2024-02-12
Payer: MEDICARE

## 2024-02-12 VITALS
DIASTOLIC BLOOD PRESSURE: 84 MMHG | OXYGEN SATURATION: 99 % | SYSTOLIC BLOOD PRESSURE: 157 MMHG | WEIGHT: 229.94 LBS | HEART RATE: 78 BPM | TEMPERATURE: 98 F | RESPIRATION RATE: 18 BRPM | HEIGHT: 66 IN

## 2024-02-12 DIAGNOSIS — Z01.818 ENCOUNTER FOR OTHER PREPROCEDURAL EXAMINATION: ICD-10-CM

## 2024-02-12 DIAGNOSIS — D48.5 NEOPLASM OF UNCERTAIN BEHAVIOR OF SKIN: ICD-10-CM

## 2024-02-12 DIAGNOSIS — Z95.1 PRESENCE OF AORTOCORONARY BYPASS GRAFT: Chronic | ICD-10-CM

## 2024-02-12 LAB
ALBUMIN SERPL ELPH-MCNC: 4.2 G/DL — SIGNIFICANT CHANGE UP (ref 3.5–5.2)
ALP SERPL-CCNC: 102 U/L — SIGNIFICANT CHANGE UP (ref 30–115)
ALT FLD-CCNC: 12 U/L — SIGNIFICANT CHANGE UP (ref 0–41)
ANION GAP SERPL CALC-SCNC: 10 MMOL/L — SIGNIFICANT CHANGE UP (ref 7–14)
AST SERPL-CCNC: 18 U/L — SIGNIFICANT CHANGE UP (ref 0–41)
BASOPHILS # BLD AUTO: 0.03 K/UL — SIGNIFICANT CHANGE UP (ref 0–0.2)
BASOPHILS NFR BLD AUTO: 0.9 % — SIGNIFICANT CHANGE UP (ref 0–1)
BILIRUB SERPL-MCNC: 0.3 MG/DL — SIGNIFICANT CHANGE UP (ref 0.2–1.2)
BUN SERPL-MCNC: 15 MG/DL — SIGNIFICANT CHANGE UP (ref 10–20)
CALCIUM SERPL-MCNC: 9.3 MG/DL — SIGNIFICANT CHANGE UP (ref 8.4–10.5)
CHLORIDE SERPL-SCNC: 106 MMOL/L — SIGNIFICANT CHANGE UP (ref 98–110)
CO2 SERPL-SCNC: 27 MMOL/L — SIGNIFICANT CHANGE UP (ref 17–32)
CREAT SERPL-MCNC: 0.8 MG/DL — SIGNIFICANT CHANGE UP (ref 0.7–1.5)
EGFR: 82 ML/MIN/1.73M2 — SIGNIFICANT CHANGE UP
EOSINOPHIL # BLD AUTO: 0.15 K/UL — SIGNIFICANT CHANGE UP (ref 0–0.7)
EOSINOPHIL NFR BLD AUTO: 4.4 % — SIGNIFICANT CHANGE UP (ref 0–8)
GLUCOSE SERPL-MCNC: 94 MG/DL — SIGNIFICANT CHANGE UP (ref 70–99)
HCT VFR BLD CALC: 38.7 % — SIGNIFICANT CHANGE UP (ref 37–47)
HGB BLD-MCNC: 12.6 G/DL — SIGNIFICANT CHANGE UP (ref 12–16)
IMM GRANULOCYTES NFR BLD AUTO: 0.3 % — SIGNIFICANT CHANGE UP (ref 0.1–0.3)
LYMPHOCYTES # BLD AUTO: 1.13 K/UL — LOW (ref 1.2–3.4)
LYMPHOCYTES # BLD AUTO: 32.9 % — SIGNIFICANT CHANGE UP (ref 20.5–51.1)
MCHC RBC-ENTMCNC: 30.4 PG — SIGNIFICANT CHANGE UP (ref 27–31)
MCHC RBC-ENTMCNC: 32.6 G/DL — SIGNIFICANT CHANGE UP (ref 32–37)
MCV RBC AUTO: 93.5 FL — SIGNIFICANT CHANGE UP (ref 81–99)
MONOCYTES # BLD AUTO: 0.36 K/UL — SIGNIFICANT CHANGE UP (ref 0.1–0.6)
MONOCYTES NFR BLD AUTO: 10.5 % — HIGH (ref 1.7–9.3)
NEUTROPHILS # BLD AUTO: 1.75 K/UL — SIGNIFICANT CHANGE UP (ref 1.4–6.5)
NEUTROPHILS NFR BLD AUTO: 51 % — SIGNIFICANT CHANGE UP (ref 42.2–75.2)
NRBC # BLD: 0 /100 WBCS — SIGNIFICANT CHANGE UP (ref 0–0)
PLATELET # BLD AUTO: 166 K/UL — SIGNIFICANT CHANGE UP (ref 130–400)
PMV BLD: 12.8 FL — HIGH (ref 7.4–10.4)
POTASSIUM SERPL-MCNC: 4.3 MMOL/L — SIGNIFICANT CHANGE UP (ref 3.5–5)
POTASSIUM SERPL-SCNC: 4.3 MMOL/L — SIGNIFICANT CHANGE UP (ref 3.5–5)
PROT SERPL-MCNC: 7.2 G/DL — SIGNIFICANT CHANGE UP (ref 6–8)
RBC # BLD: 4.14 M/UL — LOW (ref 4.2–5.4)
RBC # FLD: 13.7 % — SIGNIFICANT CHANGE UP (ref 11.5–14.5)
SODIUM SERPL-SCNC: 143 MMOL/L — SIGNIFICANT CHANGE UP (ref 135–146)
WBC # BLD: 3.43 K/UL — LOW (ref 4.8–10.8)
WBC # FLD AUTO: 3.43 K/UL — LOW (ref 4.8–10.8)

## 2024-02-12 PROCEDURE — 99214 OFFICE O/P EST MOD 30 MIN: CPT | Mod: 25

## 2024-02-12 PROCEDURE — 80053 COMPREHEN METABOLIC PANEL: CPT

## 2024-02-12 PROCEDURE — 36415 COLL VENOUS BLD VENIPUNCTURE: CPT

## 2024-02-12 PROCEDURE — 93010 ELECTROCARDIOGRAM REPORT: CPT

## 2024-02-12 PROCEDURE — 93005 ELECTROCARDIOGRAM TRACING: CPT

## 2024-02-12 PROCEDURE — 85025 COMPLETE CBC W/AUTO DIFF WBC: CPT

## 2024-02-12 NOTE — H&P PST ADULT - HISTORY OF PRESENT ILLNESS
62 y/o female, reported pain disfiguring lesions to left 5th finger and right 3rd finger; elected for above procedure.  Reported  COOL > 2 blks / 1FOS.  Denied chest pain, palp, dizziness or recent URI/UTI.       Duke Activity Status Index (DASI) from [employer]  on 2/12/2024  RESULT SUMMARY:  38.2 points  The higher the score (maximum 58.2), the higher the functional status.    7.44 METs    INPUTS:  Take care of self —> 2.75 = Yes  Walk indoors —> 1.75 = Yes  Walk 1&ndash;2 blocks on level ground —> 2.75 = Yes  Climb a flight of stairs or walk up a hill —> 5.5 = Yes  Run a short distance —> 0 = No  Do light work around the house —> 2.7 = Yes  Do moderate work around the house —> 3.5 = Yes  Do heavy work around the house —> 8 = Yes  Do yardwork —> 0 = No  Have sexual relations —> 5.25 = Yes  Participate in moderate recreational activities —> 6 = Yes  Participate in strenuous sports —> 0 = No      Revised Cardiac Risk Index for Pre-Operative Risk from [employer]  on 2/12/2024  RESULT SUMMARY:  1 points  Class II Risk    6.0 %  30-day risk of death, MI, or cardiac arrest    From [employer] 2017. These numbers are higher than those from the original study (Obinna 1999). See Evidence for details.  INPUTS:  Elevated-risk surgery —> 0 = No  History of ischemic heart disease —> 1 = Yes  History of congestive heart failure —> 0 = No  History of cerebrovascular disease —> 0 = No  Pre-operative treatment with insulin —> 0 = No  Pre-operative creatinine >2 mg/dL / 176.8 µmol/L —> 0 = No

## 2024-02-13 DIAGNOSIS — Z01.818 ENCOUNTER FOR OTHER PREPROCEDURAL EXAMINATION: ICD-10-CM

## 2024-02-13 DIAGNOSIS — D48.5 NEOPLASM OF UNCERTAIN BEHAVIOR OF SKIN: ICD-10-CM

## 2024-02-26 ENCOUNTER — APPOINTMENT (OUTPATIENT)
Dept: PLASTIC SURGERY | Facility: AMBULATORY SURGERY CENTER | Age: 64
End: 2024-02-26

## 2024-03-04 ENCOUNTER — APPOINTMENT (OUTPATIENT)
Dept: PLASTIC SURGERY | Facility: CLINIC | Age: 64
End: 2024-03-04

## 2024-03-08 NOTE — ED ADULT NURSE NOTE - CHPI ED NUR CONTEXT2
3/8/24, 9:59 AM EST    Patient goals/plan/ treatment preferences discussed by  and .  Patient goals/plan/ treatment preferences reviewed with patient/ family.  Patient/ family verbalize understanding of discharge plan and are in agreement with goal/plan/treatment preferences.  Understanding was demonstrated using the teach back method.  AVS provided by RN at time of discharge, which includes all necessary medical information pertaining to the patients current course of illness, treatment, post-discharge goals of care, and treatment preferences.     Services At/After Discharge: None       IMM Letter  IMM Letter given to Patient/Family/Significant other/Guardian/POA/by:: Kiet HUMPHRIES  IMM Letter date given:: 03/08/24  IMM Letter time given:: 0925     Pt is being discharged home with spouse.  MICHAEL met with pt and spouse.  [facility] [place] [facility].  Pt stated he did speak with Alen[facility] manager [facility] the [facility].  Pt is frustrated with the lack of [facility] from the [facility].    Pt is very appreciative of the staff here at [facility] with assisting/advocating for his care.    Pt stated he would reach out to other facilities ([facility]).  Pt declined any additional needs.  SW provided encouragement and support.    unknown

## 2024-03-15 ENCOUNTER — EMERGENCY (EMERGENCY)
Facility: HOSPITAL | Age: 64
LOS: 0 days | Discharge: HOME | End: 2024-03-15
Attending: EMERGENCY MEDICINE
Payer: MEDICARE

## 2024-03-15 VITALS
OXYGEN SATURATION: 98 % | WEIGHT: 220.02 LBS | HEIGHT: 66 IN | HEART RATE: 73 BPM | DIASTOLIC BLOOD PRESSURE: 73 MMHG | TEMPERATURE: 98 F | SYSTOLIC BLOOD PRESSURE: 142 MMHG | RESPIRATION RATE: 20 BRPM

## 2024-03-15 DIAGNOSIS — R07.89 OTHER CHEST PAIN: ICD-10-CM

## 2024-03-15 DIAGNOSIS — R06.02 SHORTNESS OF BREATH: ICD-10-CM

## 2024-03-15 DIAGNOSIS — Z86.19 PERSONAL HISTORY OF OTHER INFECTIOUS AND PARASITIC DISEASES: ICD-10-CM

## 2024-03-15 DIAGNOSIS — J45.909 UNSPECIFIED ASTHMA, UNCOMPLICATED: ICD-10-CM

## 2024-03-15 DIAGNOSIS — Z95.1 PRESENCE OF AORTOCORONARY BYPASS GRAFT: ICD-10-CM

## 2024-03-15 DIAGNOSIS — E78.5 HYPERLIPIDEMIA, UNSPECIFIED: ICD-10-CM

## 2024-03-15 DIAGNOSIS — Z79.02 LONG TERM (CURRENT) USE OF ANTITHROMBOTICS/ANTIPLATELETS: ICD-10-CM

## 2024-03-15 DIAGNOSIS — R05.1 ACUTE COUGH: ICD-10-CM

## 2024-03-15 DIAGNOSIS — R05.8 OTHER SPECIFIED COUGH: ICD-10-CM

## 2024-03-15 DIAGNOSIS — I45.19 OTHER RIGHT BUNDLE-BRANCH BLOCK: ICD-10-CM

## 2024-03-15 DIAGNOSIS — I10 ESSENTIAL (PRIMARY) HYPERTENSION: ICD-10-CM

## 2024-03-15 DIAGNOSIS — I25.10 ATHEROSCLEROTIC HEART DISEASE OF NATIVE CORONARY ARTERY WITHOUT ANGINA PECTORIS: ICD-10-CM

## 2024-03-15 DIAGNOSIS — Z87.891 PERSONAL HISTORY OF NICOTINE DEPENDENCE: ICD-10-CM

## 2024-03-15 DIAGNOSIS — Z95.1 PRESENCE OF AORTOCORONARY BYPASS GRAFT: Chronic | ICD-10-CM

## 2024-03-15 LAB
ALBUMIN SERPL ELPH-MCNC: 4.6 G/DL — SIGNIFICANT CHANGE UP (ref 3.5–5.2)
ALP SERPL-CCNC: 106 U/L — SIGNIFICANT CHANGE UP (ref 30–115)
ALT FLD-CCNC: 15 U/L — SIGNIFICANT CHANGE UP (ref 0–41)
ANION GAP SERPL CALC-SCNC: 12 MMOL/L — SIGNIFICANT CHANGE UP (ref 7–14)
AST SERPL-CCNC: 22 U/L — SIGNIFICANT CHANGE UP (ref 0–41)
BASOPHILS # BLD AUTO: 0.02 K/UL — SIGNIFICANT CHANGE UP (ref 0–0.2)
BASOPHILS NFR BLD AUTO: 0.4 % — SIGNIFICANT CHANGE UP (ref 0–1)
BILIRUB SERPL-MCNC: 0.4 MG/DL — SIGNIFICANT CHANGE UP (ref 0.2–1.2)
BUN SERPL-MCNC: 16 MG/DL — SIGNIFICANT CHANGE UP (ref 10–20)
CALCIUM SERPL-MCNC: 10.4 MG/DL — SIGNIFICANT CHANGE UP (ref 8.4–10.4)
CHLORIDE SERPL-SCNC: 107 MMOL/L — SIGNIFICANT CHANGE UP (ref 98–110)
CO2 SERPL-SCNC: 26 MMOL/L — SIGNIFICANT CHANGE UP (ref 17–32)
CREAT SERPL-MCNC: 0.9 MG/DL — SIGNIFICANT CHANGE UP (ref 0.7–1.5)
EGFR: 71 ML/MIN/1.73M2 — SIGNIFICANT CHANGE UP
EOSINOPHIL # BLD AUTO: 0.19 K/UL — SIGNIFICANT CHANGE UP (ref 0–0.7)
EOSINOPHIL NFR BLD AUTO: 3.5 % — SIGNIFICANT CHANGE UP (ref 0–8)
GLUCOSE SERPL-MCNC: 73 MG/DL — SIGNIFICANT CHANGE UP (ref 70–99)
HCT VFR BLD CALC: 38.4 % — SIGNIFICANT CHANGE UP (ref 37–47)
HGB BLD-MCNC: 12.6 G/DL — SIGNIFICANT CHANGE UP (ref 12–16)
IMM GRANULOCYTES NFR BLD AUTO: 0.2 % — SIGNIFICANT CHANGE UP (ref 0.1–0.3)
LYMPHOCYTES # BLD AUTO: 2.34 K/UL — SIGNIFICANT CHANGE UP (ref 1.2–3.4)
LYMPHOCYTES # BLD AUTO: 43.3 % — SIGNIFICANT CHANGE UP (ref 20.5–51.1)
MAGNESIUM SERPL-MCNC: 1.8 MG/DL — SIGNIFICANT CHANGE UP (ref 1.8–2.4)
MCHC RBC-ENTMCNC: 30.1 PG — SIGNIFICANT CHANGE UP (ref 27–31)
MCHC RBC-ENTMCNC: 32.8 G/DL — SIGNIFICANT CHANGE UP (ref 32–37)
MCV RBC AUTO: 91.9 FL — SIGNIFICANT CHANGE UP (ref 81–99)
MONOCYTES # BLD AUTO: 0.5 K/UL — SIGNIFICANT CHANGE UP (ref 0.1–0.6)
MONOCYTES NFR BLD AUTO: 9.2 % — SIGNIFICANT CHANGE UP (ref 1.7–9.3)
NEUTROPHILS # BLD AUTO: 2.35 K/UL — SIGNIFICANT CHANGE UP (ref 1.4–6.5)
NEUTROPHILS NFR BLD AUTO: 43.4 % — SIGNIFICANT CHANGE UP (ref 42.2–75.2)
NRBC # BLD: 0 /100 WBCS — SIGNIFICANT CHANGE UP (ref 0–0)
NT-PROBNP SERPL-SCNC: 85 PG/ML — SIGNIFICANT CHANGE UP (ref 0–300)
PLATELET # BLD AUTO: 188 K/UL — SIGNIFICANT CHANGE UP (ref 130–400)
PMV BLD: 12.5 FL — HIGH (ref 7.4–10.4)
POTASSIUM SERPL-MCNC: 4.8 MMOL/L — SIGNIFICANT CHANGE UP (ref 3.5–5)
POTASSIUM SERPL-SCNC: 4.8 MMOL/L — SIGNIFICANT CHANGE UP (ref 3.5–5)
PROT SERPL-MCNC: 7.7 G/DL — SIGNIFICANT CHANGE UP (ref 6–8)
RBC # BLD: 4.18 M/UL — LOW (ref 4.2–5.4)
RBC # FLD: 14.5 % — SIGNIFICANT CHANGE UP (ref 11.5–14.5)
SODIUM SERPL-SCNC: 145 MMOL/L — SIGNIFICANT CHANGE UP (ref 135–146)
TROPONIN T, HIGH SENSITIVITY RESULT: 8 NG/L — SIGNIFICANT CHANGE UP (ref 6–13)
TROPONIN T, HIGH SENSITIVITY RESULT: 9 NG/L — SIGNIFICANT CHANGE UP (ref 6–13)
WBC # BLD: 5.41 K/UL — SIGNIFICANT CHANGE UP (ref 4.8–10.8)
WBC # FLD AUTO: 5.41 K/UL — SIGNIFICANT CHANGE UP (ref 4.8–10.8)

## 2024-03-15 PROCEDURE — 36415 COLL VENOUS BLD VENIPUNCTURE: CPT

## 2024-03-15 PROCEDURE — 99285 EMERGENCY DEPT VISIT HI MDM: CPT

## 2024-03-15 PROCEDURE — 71045 X-RAY EXAM CHEST 1 VIEW: CPT

## 2024-03-15 PROCEDURE — 71045 X-RAY EXAM CHEST 1 VIEW: CPT | Mod: 26

## 2024-03-15 PROCEDURE — 83735 ASSAY OF MAGNESIUM: CPT

## 2024-03-15 PROCEDURE — 93005 ELECTROCARDIOGRAM TRACING: CPT

## 2024-03-15 PROCEDURE — 94640 AIRWAY INHALATION TREATMENT: CPT

## 2024-03-15 PROCEDURE — 85025 COMPLETE CBC W/AUTO DIFF WBC: CPT

## 2024-03-15 PROCEDURE — 96374 THER/PROPH/DIAG INJ IV PUSH: CPT

## 2024-03-15 PROCEDURE — 99285 EMERGENCY DEPT VISIT HI MDM: CPT | Mod: 25

## 2024-03-15 PROCEDURE — 83880 ASSAY OF NATRIURETIC PEPTIDE: CPT

## 2024-03-15 PROCEDURE — 80053 COMPREHEN METABOLIC PANEL: CPT

## 2024-03-15 PROCEDURE — 84484 ASSAY OF TROPONIN QUANT: CPT

## 2024-03-15 PROCEDURE — 93010 ELECTROCARDIOGRAM REPORT: CPT

## 2024-03-15 RX ORDER — IPRATROPIUM/ALBUTEROL SULFATE 18-103MCG
3 AEROSOL WITH ADAPTER (GRAM) INHALATION
Refills: 0 | Status: COMPLETED | OUTPATIENT
Start: 2024-03-15 | End: 2024-03-15

## 2024-03-15 RX ORDER — KETOROLAC TROMETHAMINE 30 MG/ML
15 SYRINGE (ML) INJECTION ONCE
Refills: 0 | Status: DISCONTINUED | OUTPATIENT
Start: 2024-03-15 | End: 2024-03-15

## 2024-03-15 RX ADMIN — Medication 3 MILLILITER(S): at 15:45

## 2024-03-15 RX ADMIN — Medication 15 MILLIGRAM(S): at 16:16

## 2024-03-15 RX ADMIN — Medication 3 MILLILITER(S): at 16:00

## 2024-03-15 RX ADMIN — Medication 3 MILLILITER(S): at 16:16

## 2024-03-15 NOTE — ED PROVIDER NOTE - PROGRESS NOTE DETAILS
JS: Trop wo significant delta. Shared decision making used. Offered patient OBS stay however patient would prefer to be d/c with OP cards follow up. Results d/w patient and family and copies of results provided.  Pt instructed to return if any worsening symptoms or concerns.  They verbalize understanding.

## 2024-03-15 NOTE — ED PROVIDER NOTE - PATIENT PORTAL LINK FT
You can access the FollowMyHealth Patient Portal offered by [facility] by registering at the following website: [email or web address]. By joining [employer]’s FollowMyHealth portal, you will also be able to view your health information using other applications (apps) compatible with our system.

## 2024-03-15 NOTE — ED PROVIDER NOTE - PROVIDER TOKENS
PROVIDER:[TOKEN:[[id number]],FOLLOWUP:[Urgent],ESTABLISHEDPATIENT:[T]] PROVIDER:[TOKEN:[[id number]],FOLLOWUP:[4-6 Days],ESTABLISHEDPATIENT:[T]]

## 2024-03-15 NOTE — ED PROVIDER NOTE - CLINICAL SUMMARY MEDICAL DECISION MAKING FREE TEXT BOX
Pt with chest pain, reassuring labs with rpt trop not showing a significant delta.  offered obs for further cardiac w/u, but pt prefers to see OP cardiology.  will dc home.  Pt was given strict return to ED precautions and pt indicated that he/she understood. Any ordered labs and EKG were reviewed, Dr. Amira Ramos, attending physician.  Any imaging was ordered and reviewed by me, Dr. Amira Ramos, attending physician.  Appropriate medications for patient's presenting complaints were ordered and effects were reassessed.  Patient's records, if available,  (prior hospital, ED visit, and/or nursing home notes if available) were reviewed.  Additional history was obtained from EMS, family, and/or PCP (when available).  Escalation to admission/observation was considered.  1) However patient feels much better and is comfortable with discharge.  Appropriate follow-up was arranged.

## 2024-03-15 NOTE — ED PROVIDER NOTE - ATTENDING APP SHARED VISIT CONTRIBUTION OF CARE
63-year-old female with medical history of CAD, CABG last year, hypertension, hyperlipidemia, asthma, previous smoker, presents with complaint of chest pain for 2 weeks.  Patient says worse with laying down.  Patient admits also coughing which started several weeks ago.  Patient has no fever or chills.  Patient says cardiologist Dr. Santa, and has not had recent stress test.  Of note patient is still complaining of pain at the sternotomy site and the right lower extremity vein harvest site.  Exam: nad, ncat, perrl, eomi, mmm, sternotomy site well-healed, tender to palpation, rrr, ctab, abd soft, nt, nd aox3, no calf tenderness, right medial harvest site, healed, no erythema, no induration, no discharge impression: Patient with history of CAD and CABG, here with 2 weeks of chest pain, having persistent coughing for the last few weeks, likely bronchospastic, will rule out ACS versus bronchitis.  Will check labs, EKG, chest x-ray, contact her cardiologist

## 2024-03-15 NOTE — ED PROVIDER NOTE - WHICH SHOWED
ED EKG: my independent interpretation - Dr. Amira Ramos, attending physician : 74   nsr, qtc 466, no stemi, no significant heart block, incomplete right bundle branch block ED EKG: my independent interpretation - Dr. Amira Ramos, attending physician : 74   nsr, qtc 466, no stemi, no significant heart block, incomplete right bundle branch block    ED CXR film, my independent interpretation - Dr. Amira Ramos, attending physician: No significant cardiopulmonary abnormalities, no ptx, no rib fractures, post-sternotomy

## 2024-03-15 NOTE — ED PROVIDER NOTE - OBJECTIVE STATEMENT
63-year-old female with a past medical history of CAD status post CABG on antiplatelet therapy, hypertension hyperlipidemia presents to the emerged part complaining of chest pain and shortness of breath x 2 weeks.  Patient with midsternal chest pain worse with position changes with associated shortness of breath.  Patient admits to having upper respiratory infection and approximately 1 month ago but has improved.  Associated dry cough.  Denies any fevers, chills, nausea, vomiting, abdominal pain, dysuria.

## 2024-03-15 NOTE — ED PROVIDER NOTE - CARE PROVIDER_API CALL
Ellie Izquierdo  Cardiology  [address] [place] [address]  Phone: [phone]  Fax: [phone] Cgygaxyjwfh Patient  Follow Up Time: Urgent   Erlin Santa  Interventional Cardiology  [address] [place] [address], [address]  [place] [address]  Phone: [phone]  Fax: [phone]  Established Patient  Follow Up Time: 4-6 Days

## 2024-03-15 NOTE — ED PROVIDER NOTE - CARE PROVIDERS DIRECT ADDRESSES
[email or web address]

## 2024-03-15 NOTE — ED ADULT NURSE NOTE - OBJECTIVE STATEMENT
Pt. c/o chest pain and SOB x 2 weeks. Pt. denies N/V/D. Pt. denies fever/chills. Pt. denies any other symptoms.

## 2024-03-15 NOTE — ED PROVIDER NOTE - PHYSICAL EXAMINATION
CONST: Well appearing in NAD  EYES: PERRL, EOMI, Sclera and conjunctiva clear.   ENT: Oropharynx normal appearing, no erythema or exudates. Uvula midline.  CARD: Normal S1 S2; Normal rate and rhythm  RESP: Equal BS B/L, No wheezes, rhonchi or rales. No distress  GI: Soft, non-tender, non-distended.  MS: Anterior chest wall TTP.   SKIN: Warm, dry, no acute rashes.   NEURO: A&Ox3, No focal deficits. Strength 5/5 with no sensory deficits. Steady gait

## 2024-03-15 NOTE — ED ADULT NURSE NOTE - CAS TRG GEN SKIN COLOR
Bed: RME05  Expected date:   Expected time:   Means of arrival:   Comments:  Open at 1720 Nicole Wright RN  02/23/21 [id number]
Normal for race

## 2024-04-01 ENCOUNTER — EMERGENCY (EMERGENCY)
Facility: HOSPITAL | Age: 64
LOS: 0 days | Discharge: HOME | End: 2024-04-01
Attending: EMERGENCY MEDICINE
Payer: MEDICARE

## 2024-04-01 VITALS
HEIGHT: 66 IN | DIASTOLIC BLOOD PRESSURE: 82 MMHG | WEIGHT: 225.09 LBS | RESPIRATION RATE: 19 BRPM | HEART RATE: 78 BPM | TEMPERATURE: 98 F | SYSTOLIC BLOOD PRESSURE: 160 MMHG | OXYGEN SATURATION: 97 %

## 2024-04-01 DIAGNOSIS — M79.89 OTHER SPECIFIED SOFT TISSUE DISORDERS: ICD-10-CM

## 2024-04-01 DIAGNOSIS — M79.642 PAIN IN LEFT HAND: ICD-10-CM

## 2024-04-01 DIAGNOSIS — Z95.1 PRESENCE OF AORTOCORONARY BYPASS GRAFT: Chronic | ICD-10-CM

## 2024-04-01 DIAGNOSIS — E78.5 HYPERLIPIDEMIA, UNSPECIFIED: ICD-10-CM

## 2024-04-01 DIAGNOSIS — I25.10 ATHEROSCLEROTIC HEART DISEASE OF NATIVE CORONARY ARTERY WITHOUT ANGINA PECTORIS: ICD-10-CM

## 2024-04-01 DIAGNOSIS — E11.9 TYPE 2 DIABETES MELLITUS WITHOUT COMPLICATIONS: ICD-10-CM

## 2024-04-01 DIAGNOSIS — I10 ESSENTIAL (PRIMARY) HYPERTENSION: ICD-10-CM

## 2024-04-01 DIAGNOSIS — Z95.1 PRESENCE OF AORTOCORONARY BYPASS GRAFT: ICD-10-CM

## 2024-04-01 DIAGNOSIS — Z87.891 PERSONAL HISTORY OF NICOTINE DEPENDENCE: ICD-10-CM

## 2024-04-01 PROCEDURE — 99284 EMERGENCY DEPT VISIT MOD MDM: CPT | Mod: 25

## 2024-04-01 PROCEDURE — 73130 X-RAY EXAM OF HAND: CPT | Mod: LT

## 2024-04-01 PROCEDURE — 29125 APPL SHORT ARM SPLINT STATIC: CPT | Mod: LT

## 2024-04-01 PROCEDURE — 73130 X-RAY EXAM OF HAND: CPT | Mod: 26,LT

## 2024-04-01 PROCEDURE — 99283 EMERGENCY DEPT VISIT LOW MDM: CPT | Mod: 25

## 2024-04-01 NOTE — ED PROVIDER NOTE - NSFOLLOWUPINSTRUCTIONS_ED_ALL_ED_FT
Our Emergency Department Referral Coordinators will be reaching out to you in the next 24-48 hours from 9:00am to 5:00pm with a follow up appointment. Please expect a phone call from the hospital in that time frame. If you do not receive a call or if you have any questions or concerns, you can reach them at   [phone]    Hand Pain  Many things can cause hand pain. Some common causes are:  An injury.Repeating the same movement with your hand over and over (overuse).Osteoporosis.Arthritis.Lumps in the tendons or joints of the hand and wrist (ganglion cysts).Nerve compression syndromes (carpal tunnel syndrome).Inflammation of the tendons (tendinitis).Infection.Follow these instructions at home:  Pay attention to any changes in your symptoms. Take these actions to help with your discomfort:  Managing pain, stiffness, and swelling        Take over-the-counter and prescription medicines only as told by your health care provider.Wear a hand splint or support as told by your health care provider.If directed, put ice on the affected area:  Put ice in a plastic bag.Place a towel between your skin and the bag.Leave the ice on for 20 minutes, 2–3 times a day.Activity     Take breaks from repetitive activity often.Avoid activities that make your pain worse.Minimize stress on your hands and wrists as much as possible.Do stretches or exercises as told by your health care provider.Do not do activities that make your pain worse.Contact a health care provider if:  Your pain does not get better after a few days of self-care.Your pain gets worse.Your pain affects your ability to do your daily activities.Get help right away if:  Your hand becomes warm, red, or swollen.Your hand is numb or tingling.Your hand is extremely swollen or deformed.Your hand or fingers turn white or blue.You cannot move your hand, wrist, or fingers.Summary  Many things can cause hand pain.Contact your health care provider if your pain does not get better after a few days of self care.Minimize stress on your hands and wrists as much as possible.Do not do activities that make your pain worse.This information is not intended to replace advice given to you by your health care provider. Make sure you discuss any questions you have with your health care provider.    Document Released: 01/13/2017 Document Revised: 09/13/2019 Document Reviewed: 09/13/2019  Elsevier Interactive Patient Education © 2019 Elsevier Inc[employer]

## 2024-04-01 NOTE — ED PROVIDER NOTE - PATIENT PORTAL LINK FT
You can access the FollowMyHealth Patient Portal offered by [facility] by registering at the following website: [email or web address]. By joining [employer]’s FollowMyHealth portal, you will also be able to view your health information using other applications (apps) compatible with our system.

## 2024-04-01 NOTE — ED PROVIDER NOTE - CLINICAL SUMMARY MEDICAL DECISION MAKING FREE TEXT BOX
Patient presented with 2 weeks of atraumatic left hand pain as documented.  Pain is primarily on the palmar region and patient states that it is worse with movement of the thumb.  Denies having this before.  Otherwise on arrival, patient afebrile, hemodynamically stable, neurovascularly intact.  No overlying signs of infection, no erythema or swelling noted, full range of motion of all joints actively and passively but with mild pain to the thenar eminence.  X-ray obtained and negative for acute bony injury.  Given the above, will discharge home with outpatient hand follow up. Patient agreeable with plan. Agrees to return to ED for any new or worsening symptoms.

## 2024-04-01 NOTE — ED ADULT NURSE NOTE - FINAL NURSING ELECTRONIC SIGNATURE
45 y/o male brought in for right hand pain x 2 weeks. pt denies tingling or numbness; able to move fingers; pt states "I think I pulled a tendon". pt has been taking aleve for pain.
01-Apr-2024 13:45

## 2024-04-01 NOTE — ED PROVIDER NOTE - DIFFERENTIAL DIAGNOSIS
L hand pain x 2 weeks as documented, likely soft tissue strain but r/o underlying bony abnormality Differential Diagnosis

## 2024-04-16 ENCOUNTER — APPOINTMENT (OUTPATIENT)
Dept: PLASTIC SURGERY | Facility: CLINIC | Age: 64
End: 2024-04-16
Payer: MEDICARE

## 2024-04-16 DIAGNOSIS — D48.19 OTHER SPECIFIED NEOPLASM OF UNCERTAIN BEHAVIOR OF CONNECTIVE AND OTHER SOFT TISSUE: ICD-10-CM

## 2024-04-16 PROCEDURE — 99212 OFFICE O/P EST SF 10 MIN: CPT

## 2024-04-16 NOTE — PHYSICAL EXAM
[NI] : Normal [de-identified] : volar left fifth digit GCT approx 1.5cm     right thuid finger DIPJ mucous cyst    rigth index fingertip dry skin [de-identified] : as above

## 2024-04-16 NOTE — ASSESSMENT
[FreeTextEntry1] : xray  OR excision left fifth digit lesion  possible mucous syst excision  xray  Regarding the hand surgery, we discussed the risk of bleeding, infection, need for additional unplanned surgery, need for postoperative hand occupational therapy, possible lack of improvement and diminished hand function.  We discussed prolonged recovery.  All questions were answered and all risks were well understood by the patient.  Gold Diabetic ointment to right index finger  4/16/2024 as above left fifth finger GCT stable new subdermal lesion left palm--very tender ?nerve? ?bvascular?  suggest MRI and likelty excision  Regarding the hand surgery, we discussed the risk of bleeding, infection, need for additional unplanned surgery, need for postoperative hand occupational therapy, possible lack of improvement and diminished hand function.  We discussed prolonged recovery.  All questions were answered and all risks were well understood by the patient.  neurontin script given 100 TID--increase to 200 TID in one week if no improvement

## 2024-04-16 NOTE — HISTORY OF PRESENT ILLNESS
[FreeTextEntry1] : 63 yr old old woman  RHD  here for evaluation of multipole finger issues:  1 left volar little finger--soft tissue lesion c/w GCT 2 mucous cyst right third finger 3  right index fingertip dry skin

## 2024-05-03 ENCOUNTER — OUTPATIENT (OUTPATIENT)
Dept: OUTPATIENT SERVICES | Facility: HOSPITAL | Age: 64
LOS: 1 days | End: 2024-05-03
Payer: MEDICARE

## 2024-05-03 VITALS
WEIGHT: 250 LBS | TEMPERATURE: 98 F | HEART RATE: 71 BPM | RESPIRATION RATE: 18 BRPM | SYSTOLIC BLOOD PRESSURE: 148 MMHG | DIASTOLIC BLOOD PRESSURE: 71 MMHG | OXYGEN SATURATION: 98 % | HEIGHT: 66 IN

## 2024-05-03 DIAGNOSIS — Z95.1 PRESENCE OF AORTOCORONARY BYPASS GRAFT: Chronic | ICD-10-CM

## 2024-05-03 DIAGNOSIS — Z01.818 ENCOUNTER FOR OTHER PREPROCEDURAL EXAMINATION: ICD-10-CM

## 2024-05-03 DIAGNOSIS — Z00.8 ENCOUNTER FOR OTHER GENERAL EXAMINATION: ICD-10-CM

## 2024-05-03 PROCEDURE — 99214 OFFICE O/P EST MOD 30 MIN: CPT | Mod: 25

## 2024-05-03 RX ORDER — ASPIRIN/CALCIUM CARB/MAGNESIUM 324 MG
0 TABLET ORAL
Refills: 0 | DISCHARGE

## 2024-05-03 RX ORDER — IPRATROPIUM/ALBUTEROL SULFATE 18-103MCG
1 AEROSOL WITH ADAPTER (GRAM) INHALATION
Qty: 0 | Refills: 0 | DISCHARGE

## 2024-05-03 NOTE — H&P PST ADULT - HISTORY OF PRESENT ILLNESS
63 year old female presents for sedation MRI left hand w/wo on 5/9/24. Pt hand has lump on 5th finger left hand and chronic bruise that is painful to touch in medial hand.     Pt had hx of CABG 3/29/2023, asthma/copd    PATIENT CURRENTLY DENIES CHEST PAIN  SHORTNESS OF BREATH  PALPITATIONS,  DYSURIA, OR UPPER RESPIRATORY INFECTION IN PAST 2 WEEKS  Patient verbalized understanding of instructions and was given the opportunity to ask questions and have them answered.  As per patient, this is their complete medical and surgical history, including medications both prescribed or over the counter.  written and verbal instructions with teach back on chlorhexidine shampoo provided,  pt verbalized understanding with returned demonstration    Anesthesia Alert  NO--Difficult Airway  NO--History of neck surgery or radiation  NO--Limited ROM of neck  NO--History of Malignant hyperthermia  NO--Personal or family history of Pseudocholinesterase deficiency.  NO--Prior Anesthesia Complication  NO--Latex Allergy  NO--Loose teeth  NO--History of Rheumatoid Arthritis  NO--HARRIETT  YES--Bleeding risk- aspirin   YES--Other_____ asthma  Mallampati airway: Class II    Duke Activity Status Index (DASI) from [employer]  on 5/3/2024  ** All calculations should be rechecked by clinician prior to use **    RESULT SUMMARY:  15.95 points  The higher the score (maximum 58.2), the higher the functional status.    4.70 METs        INPUTS:  Take care of self —> 2.75 = Yes  Walk indoors —> 1.75 = Yes  Walk 1&ndash;2 blocks on level ground —> 0 = No  Climb a flight of stairs or walk up a hill —> 0 = No  Run a short distance —> 0 = No  Do light work around the house —> 2.7 = Yes  Do moderate work around the house —> 3.5 = Yes  Do heavy work around the house —> 0 = No  Do yardwork —> 0 = No  Have sexual relations —> 5.25 = Yes  Participate in moderate recreational activities —> 0 = No  Participate in strenuous sports —> 0 = No    Revised Cardiac Risk Index for Pre-Operative Risk from [employer]  on 5/3/2024  ** All calculations should be rechecked by clinician prior to use **    RESULT SUMMARY:  1 points  Class II Risk    6.0 %  30-day risk of death, MI, or cardiac arrest    From [employer] 2017. These numbers are higher than those from the original study ([place] 1999). See Evidence for details.      INPUTS:  Elevated-risk surgery —> 0 = No  History of ischemic heart disease —> 1 = Yes  History of congestive heart failure —> 0 = No  History of cerebrovascular disease —> 0 = No  Pre-operative treatment with insulin —> 0 = No  Pre-operative creatinine >2 mg/dL / 176.8 µmol/L —> 0 = No

## 2024-05-04 DIAGNOSIS — Z00.8 ENCOUNTER FOR OTHER GENERAL EXAMINATION: ICD-10-CM

## 2024-05-04 DIAGNOSIS — Z01.818 ENCOUNTER FOR OTHER PREPROCEDURAL EXAMINATION: ICD-10-CM

## 2024-05-06 ENCOUNTER — NON-APPOINTMENT (OUTPATIENT)
Age: 64
End: 2024-05-06

## 2024-05-07 ENCOUNTER — NON-APPOINTMENT (OUTPATIENT)
Age: 64
End: 2024-05-07

## 2024-05-09 ENCOUNTER — OUTPATIENT (OUTPATIENT)
Dept: OUTPATIENT SERVICES | Facility: HOSPITAL | Age: 64
LOS: 1 days | End: 2024-05-09
Payer: MEDICARE

## 2024-05-09 VITALS
HEART RATE: 75 BPM | DIASTOLIC BLOOD PRESSURE: 75 MMHG | RESPIRATION RATE: 18 BRPM | SYSTOLIC BLOOD PRESSURE: 100 MMHG | OXYGEN SATURATION: 97 %

## 2024-05-09 VITALS
TEMPERATURE: 97 F | RESPIRATION RATE: 18 BRPM | OXYGEN SATURATION: 97 % | HEART RATE: 72 BPM | DIASTOLIC BLOOD PRESSURE: 89 MMHG | SYSTOLIC BLOOD PRESSURE: 147 MMHG

## 2024-05-09 DIAGNOSIS — Z95.1 PRESENCE OF AORTOCORONARY BYPASS GRAFT: Chronic | ICD-10-CM

## 2024-05-09 DIAGNOSIS — D48.19 OTHER SPECIFIED NEOPLASM OF UNCERTAIN BEHAVIOR OF CONNECTIVE AND OTHER SOFT TISSUE: ICD-10-CM

## 2024-05-09 PROCEDURE — A9579: CPT

## 2024-05-09 PROCEDURE — 73220 MRI UPPR EXTREMITY W/O&W/DYE: CPT | Mod: 26,LT

## 2024-05-09 PROCEDURE — 73220 MRI UPPR EXTREMITY W/O&W/DYE: CPT | Mod: LT

## 2024-05-09 NOTE — ASU PATIENT PROFILE, ADULT - FALL HARM RISK - UNIVERSAL INTERVENTIONS
Bed in lowest position, wheels locked, appropriate side rails in place/Call bell, personal items and telephone in reach/Instruct patient to call for assistance before getting out of bed or chair/Non-slip footwear when patient is out of bed/[place] to call system/Physically safe environment - no spills, clutter or unnecessary equipment/Purposeful Proactive Rounding/Room/bathroom lighting operational, light cord in reach

## 2024-05-09 NOTE — CHART NOTE - NSCHARTNOTEFT_GEN_A_CORE
PACU ANESTHESIA ADMISSION NOTE      Procedure:   Post op diagnosis:      ____  Intubated  TV:______       Rate: ______      FiO2: ______    _x__  Patent Airway    ___x_  Full return of protective reflexes    __x__  Full recovery from anesthesia / back to baseline     Vitals:   T: 36          R:  11                BP:   138/74              Sat:  97                 P: 62      Mental Status:  __x__ Awake   _____ Alert   _____ Drowsy   _____ Sedated    Nausea/Vomiting:  __x__ NO  ______Yes,   See Post - Op Orders          Pain Scale (0-10):  __x___    Treatment: ____ None    ____ See Post - Op/PCA Orders    Post - Operative Fluids:   x___ Oral   ____ See Post - Op Orders    Plan: Discharge:  x ____Home       _____Floor     _____Critical Care    _____  Other:_________________    Comments:

## 2024-05-09 NOTE — ASU PREOP CHECKLIST - ISOLATION PRECAUTIONS
89 year old female, no past medical history, who presents with b/l numbness x3 weeks. patient endorses worsening symptoms today prompting ed eval. patient reports numbness that begins @ plantar aspect of b/l feet radiating to knees. reports associated decrease appetite. denies fever, chills, chest pain, shortness of breath, abd pain, nausea/vomiting/diarrhea, urinary symptoms, skin changes, back pain, syncope. no head injury/fall. patient lives @ home alone, ambulates with cane. none

## 2024-05-09 NOTE — ASU PREOP CHECKLIST - HEIGHT IN FEET
Continue to MONITOR CLOSELY to determine the need for TREATMENT and INCREASE/DECREASE in length of time till next follow up visit. 5

## 2024-05-10 DIAGNOSIS — D48.19 OTHER SPECIFIED NEOPLASM OF UNCERTAIN BEHAVIOR OF CONNECTIVE AND OTHER SOFT TISSUE: ICD-10-CM

## 2024-05-14 ENCOUNTER — NON-APPOINTMENT (OUTPATIENT)
Age: 64
End: 2024-05-14

## 2024-06-05 DIAGNOSIS — G89.18 OTHER ACUTE POSTPROCEDURAL PAIN: ICD-10-CM

## 2024-06-07 ENCOUNTER — NON-APPOINTMENT (OUTPATIENT)
Age: 64
End: 2024-06-07

## 2024-06-12 ENCOUNTER — APPOINTMENT (OUTPATIENT)
Dept: PLASTIC SURGERY | Facility: AMBULATORY SURGERY CENTER | Age: 64
End: 2024-06-12

## 2024-06-19 ENCOUNTER — APPOINTMENT (OUTPATIENT)
Dept: PLASTIC SURGERY | Facility: CLINIC | Age: 64
End: 2024-06-19

## 2024-06-26 ENCOUNTER — APPOINTMENT (OUTPATIENT)
Dept: CARDIOLOGY | Facility: CLINIC | Age: 64
End: 2024-06-26
Payer: MEDICARE

## 2024-06-26 VITALS
DIASTOLIC BLOOD PRESSURE: 74 MMHG | WEIGHT: 228 LBS | HEART RATE: 78 BPM | BODY MASS INDEX: 36.64 KG/M2 | SYSTOLIC BLOOD PRESSURE: 132 MMHG | HEIGHT: 66 IN

## 2024-06-26 DIAGNOSIS — I25.10 ATHEROSCLEROTIC HEART DISEASE OF NATIVE CORONARY ARTERY W/OUT ANGINA PECTORIS: ICD-10-CM

## 2024-06-26 DIAGNOSIS — E78.5 HYPERLIPIDEMIA, UNSPECIFIED: ICD-10-CM

## 2024-06-26 DIAGNOSIS — Z95.1 PRESENCE OF AORTOCORONARY BYPASS GRAFT: ICD-10-CM

## 2024-06-26 DIAGNOSIS — I10 ESSENTIAL (PRIMARY) HYPERTENSION: ICD-10-CM

## 2024-06-26 PROCEDURE — 99213 OFFICE O/P EST LOW 20 MIN: CPT

## 2024-06-26 PROCEDURE — 93000 ELECTROCARDIOGRAM COMPLETE: CPT

## 2024-06-26 RX ORDER — UBIDECARENONE/VIT E ACET 100MG-5
25 MCG CAPSULE ORAL
Refills: 0 | Status: ACTIVE | COMMUNITY

## 2024-06-26 RX ORDER — ATORVASTATIN CALCIUM 40 MG/1
40 TABLET, FILM COATED ORAL
Qty: 90 | Refills: 3 | Status: ACTIVE | COMMUNITY
Start: 2023-03-30 | End: 1900-01-01

## 2024-06-26 RX ORDER — ASPIRIN ENTERIC COATED TABLETS 81 MG 81 MG/1
81 TABLET, DELAYED RELEASE ORAL DAILY
Qty: 90 | Refills: 3 | Status: ACTIVE | COMMUNITY
Start: 2023-06-28 | End: 1900-01-01

## 2024-06-26 RX ORDER — METOPROLOL SUCCINATE 50 MG/1
50 TABLET, EXTENDED RELEASE ORAL
Qty: 90 | Refills: 3 | Status: ACTIVE | COMMUNITY
Start: 2023-06-28 | End: 1900-01-01

## 2024-06-27 PROBLEM — I25.10 CAD (CORONARY ARTERY DISEASE): Status: ACTIVE | Noted: 2023-04-26

## 2024-06-27 PROBLEM — Z95.1 S/P CABG (CORONARY ARTERY BYPASS GRAFT): Status: ACTIVE | Noted: 2023-03-30

## 2024-06-27 PROBLEM — E78.5 DYSLIPIDEMIA: Status: ACTIVE | Noted: 2023-04-26

## 2024-06-27 PROBLEM — I10 BENIGN ESSENTIAL HYPERTENSION: Status: ACTIVE | Noted: 2023-04-26

## 2024-07-08 ENCOUNTER — EMERGENCY (EMERGENCY)
Facility: HOSPITAL | Age: 64
LOS: 0 days | Discharge: HOME | End: 2024-07-08
Attending: EMERGENCY MEDICINE
Payer: MEDICARE

## 2024-07-08 VITALS
SYSTOLIC BLOOD PRESSURE: 146 MMHG | TEMPERATURE: 98 F | DIASTOLIC BLOOD PRESSURE: 77 MMHG | HEIGHT: 66 IN | WEIGHT: 233.03 LBS | HEART RATE: 75 BPM | OXYGEN SATURATION: 97 % | RESPIRATION RATE: 18 BRPM

## 2024-07-08 DIAGNOSIS — Z95.1 PRESENCE OF AORTOCORONARY BYPASS GRAFT: ICD-10-CM

## 2024-07-08 DIAGNOSIS — I10 ESSENTIAL (PRIMARY) HYPERTENSION: ICD-10-CM

## 2024-07-08 DIAGNOSIS — M54.50 LOW BACK PAIN, UNSPECIFIED: ICD-10-CM

## 2024-07-08 DIAGNOSIS — Z95.1 PRESENCE OF AORTOCORONARY BYPASS GRAFT: Chronic | ICD-10-CM

## 2024-07-08 PROCEDURE — 99283 EMERGENCY DEPT VISIT LOW MDM: CPT

## 2024-07-08 PROCEDURE — 99284 EMERGENCY DEPT VISIT MOD MDM: CPT

## 2024-07-08 RX ORDER — METHOCARBAMOL 500 MG
2 TABLET ORAL
Qty: 18 | Refills: 0
Start: 2024-07-08 | End: 2024-07-10

## 2024-07-08 RX ORDER — METHOCARBAMOL 500 MG
1500 TABLET ORAL ONCE
Refills: 0 | Status: COMPLETED | OUTPATIENT
Start: 2024-07-08 | End: 2024-07-08

## 2024-07-08 RX ORDER — LIDOCAINE HCL 28 MG/G
1 GEL TOPICAL ONCE
Refills: 0 | Status: COMPLETED | OUTPATIENT
Start: 2024-07-08 | End: 2024-07-08

## 2024-07-08 RX ORDER — LIDOCAINE HCL 28 MG/G
1 GEL TOPICAL
Qty: 7 | Refills: 0
Start: 2024-07-08 | End: 2024-07-14

## 2024-07-08 RX ADMIN — Medication 1500 MILLIGRAM(S): at 14:08

## 2024-07-08 RX ADMIN — LIDOCAINE HCL 1 PATCH: 28 GEL TOPICAL at 14:08

## 2024-11-23 ENCOUNTER — OUTPATIENT (OUTPATIENT)
Dept: OUTPATIENT SERVICES | Facility: HOSPITAL | Age: 64
LOS: 1 days | End: 2024-11-23
Payer: MEDICARE

## 2024-11-23 DIAGNOSIS — Z12.31 ENCOUNTER FOR SCREENING MAMMOGRAM FOR MALIGNANT NEOPLASM OF BREAST: ICD-10-CM

## 2024-11-23 DIAGNOSIS — Z95.1 PRESENCE OF AORTOCORONARY BYPASS GRAFT: Chronic | ICD-10-CM

## 2024-11-23 PROCEDURE — 77063 BREAST TOMOSYNTHESIS BI: CPT | Mod: 26

## 2024-11-23 PROCEDURE — 77063 BREAST TOMOSYNTHESIS BI: CPT

## 2024-11-23 PROCEDURE — 77067 SCR MAMMO BI INCL CAD: CPT | Mod: 26

## 2024-11-23 PROCEDURE — 77067 SCR MAMMO BI INCL CAD: CPT

## 2024-11-24 DIAGNOSIS — Z12.31 ENCOUNTER FOR SCREENING MAMMOGRAM FOR MALIGNANT NEOPLASM OF BREAST: ICD-10-CM

## 2025-01-02 ENCOUNTER — NON-APPOINTMENT (OUTPATIENT)
Age: 65
End: 2025-01-02

## 2025-01-02 ENCOUNTER — APPOINTMENT (OUTPATIENT)
Dept: CARDIOLOGY | Facility: CLINIC | Age: 65
End: 2025-01-02
Payer: MEDICARE

## 2025-01-02 VITALS
SYSTOLIC BLOOD PRESSURE: 124 MMHG | DIASTOLIC BLOOD PRESSURE: 90 MMHG | WEIGHT: 230 LBS | BODY MASS INDEX: 36.96 KG/M2 | HEIGHT: 66 IN | HEART RATE: 75 BPM

## 2025-01-02 DIAGNOSIS — E11.59 TYPE 2 DIABETES MELLITUS WITH OTHER CIRCULATORY COMPLICATIONS: ICD-10-CM

## 2025-01-02 DIAGNOSIS — I25.10 ATHEROSCLEROTIC HEART DISEASE OF NATIVE CORONARY ARTERY W/OUT ANGINA PECTORIS: ICD-10-CM

## 2025-01-02 DIAGNOSIS — Z95.1 PRESENCE OF AORTOCORONARY BYPASS GRAFT: ICD-10-CM

## 2025-01-02 DIAGNOSIS — I10 ESSENTIAL (PRIMARY) HYPERTENSION: ICD-10-CM

## 2025-01-02 DIAGNOSIS — E78.5 HYPERLIPIDEMIA, UNSPECIFIED: ICD-10-CM

## 2025-01-02 PROCEDURE — 99214 OFFICE O/P EST MOD 30 MIN: CPT

## 2025-01-02 PROCEDURE — 93000 ELECTROCARDIOGRAM COMPLETE: CPT

## 2025-03-03 ENCOUNTER — APPOINTMENT (OUTPATIENT)
Dept: VASCULAR SURGERY | Facility: CLINIC | Age: 65
End: 2025-03-03
Payer: MEDICARE

## 2025-03-03 VITALS
WEIGHT: 230 LBS | SYSTOLIC BLOOD PRESSURE: 127 MMHG | HEIGHT: 66 IN | BODY MASS INDEX: 36.96 KG/M2 | DIASTOLIC BLOOD PRESSURE: 82 MMHG

## 2025-03-03 DIAGNOSIS — I70.211 ATHEROSCLEROSIS OF NATIVE ARTERIES OF EXTREMITIES WITH INTERMITTENT CLAUDICATION, RIGHT LEG: ICD-10-CM

## 2025-03-03 DIAGNOSIS — Z87.891 PERSONAL HISTORY OF NICOTINE DEPENDENCE: ICD-10-CM

## 2025-03-03 PROCEDURE — 99203 OFFICE O/P NEW LOW 30 MIN: CPT

## 2025-03-03 PROCEDURE — 93926 LOWER EXTREMITY STUDY: CPT | Mod: RT

## 2025-04-23 ENCOUNTER — APPOINTMENT (OUTPATIENT)
Dept: CARDIOLOGY | Facility: CLINIC | Age: 65
End: 2025-04-23
Payer: MEDICAID

## 2025-04-23 PROCEDURE — 93306 TTE W/DOPPLER COMPLETE: CPT

## 2025-05-01 ENCOUNTER — APPOINTMENT (OUTPATIENT)
Dept: CARDIOLOGY | Facility: CLINIC | Age: 65
End: 2025-05-01
Payer: MEDICARE

## 2025-05-01 VITALS
SYSTOLIC BLOOD PRESSURE: 116 MMHG | DIASTOLIC BLOOD PRESSURE: 72 MMHG | HEART RATE: 71 BPM | OXYGEN SATURATION: 98 % | BODY MASS INDEX: 37.45 KG/M2 | HEIGHT: 66 IN | WEIGHT: 233 LBS

## 2025-05-01 DIAGNOSIS — E11.59 TYPE 2 DIABETES MELLITUS WITH OTHER CIRCULATORY COMPLICATIONS: ICD-10-CM

## 2025-05-01 DIAGNOSIS — Z95.1 PRESENCE OF AORTOCORONARY BYPASS GRAFT: ICD-10-CM

## 2025-05-01 DIAGNOSIS — E78.5 HYPERLIPIDEMIA, UNSPECIFIED: ICD-10-CM

## 2025-05-01 DIAGNOSIS — I10 ESSENTIAL (PRIMARY) HYPERTENSION: ICD-10-CM

## 2025-05-01 DIAGNOSIS — I25.10 ATHEROSCLEROTIC HEART DISEASE OF NATIVE CORONARY ARTERY W/OUT ANGINA PECTORIS: ICD-10-CM

## 2025-05-01 PROCEDURE — 93000 ELECTROCARDIOGRAM COMPLETE: CPT

## 2025-05-01 PROCEDURE — 99214 OFFICE O/P EST MOD 30 MIN: CPT

## 2025-05-01 RX ORDER — ATORVASTATIN CALCIUM 40 MG/1
40 TABLET, FILM COATED ORAL
Refills: 0 | Status: ACTIVE | COMMUNITY

## 2025-07-25 ENCOUNTER — EMERGENCY (EMERGENCY)
Facility: HOSPITAL | Age: 65
LOS: 0 days | Discharge: HOME | End: 2025-07-26
Attending: EMERGENCY MEDICINE
Payer: MEDICARE

## 2025-07-25 VITALS
SYSTOLIC BLOOD PRESSURE: 168 MMHG | HEART RATE: 71 BPM | RESPIRATION RATE: 18 BRPM | TEMPERATURE: 98 F | OXYGEN SATURATION: 99 % | WEIGHT: 229.94 LBS | HEIGHT: 66 IN | DIASTOLIC BLOOD PRESSURE: 91 MMHG

## 2025-07-25 DIAGNOSIS — E11.9 TYPE 2 DIABETES MELLITUS WITHOUT COMPLICATIONS: ICD-10-CM

## 2025-07-25 DIAGNOSIS — E78.5 HYPERLIPIDEMIA, UNSPECIFIED: ICD-10-CM

## 2025-07-25 DIAGNOSIS — R07.89 OTHER CHEST PAIN: ICD-10-CM

## 2025-07-25 DIAGNOSIS — Z95.1 PRESENCE OF AORTOCORONARY BYPASS GRAFT: ICD-10-CM

## 2025-07-25 DIAGNOSIS — I10 ESSENTIAL (PRIMARY) HYPERTENSION: ICD-10-CM

## 2025-07-25 DIAGNOSIS — I25.10 ATHEROSCLEROTIC HEART DISEASE OF NATIVE CORONARY ARTERY WITHOUT ANGINA PECTORIS: ICD-10-CM

## 2025-07-25 DIAGNOSIS — Z87.891 PERSONAL HISTORY OF NICOTINE DEPENDENCE: ICD-10-CM

## 2025-07-25 DIAGNOSIS — Z95.1 PRESENCE OF AORTOCORONARY BYPASS GRAFT: Chronic | ICD-10-CM

## 2025-07-25 DIAGNOSIS — J45.909 UNSPECIFIED ASTHMA, UNCOMPLICATED: ICD-10-CM

## 2025-07-25 LAB
ALBUMIN SERPL ELPH-MCNC: 4.4 G/DL — SIGNIFICANT CHANGE UP (ref 3.5–5.2)
ALP SERPL-CCNC: 115 U/L — SIGNIFICANT CHANGE UP (ref 30–115)
ALT FLD-CCNC: 11 U/L — SIGNIFICANT CHANGE UP (ref 0–41)
ANION GAP SERPL CALC-SCNC: 12 MMOL/L — SIGNIFICANT CHANGE UP (ref 7–14)
AST SERPL-CCNC: 16 U/L — SIGNIFICANT CHANGE UP (ref 0–41)
BASOPHILS # BLD AUTO: 0.03 K/UL — SIGNIFICANT CHANGE UP (ref 0–0.2)
BASOPHILS NFR BLD AUTO: 0.6 % — SIGNIFICANT CHANGE UP (ref 0–1)
BILIRUB SERPL-MCNC: 0.4 MG/DL — SIGNIFICANT CHANGE UP (ref 0.2–1.2)
BUN SERPL-MCNC: 16 MG/DL — SIGNIFICANT CHANGE UP (ref 10–20)
CALCIUM SERPL-MCNC: 9.9 MG/DL — SIGNIFICANT CHANGE UP (ref 8.4–10.5)
CHLORIDE SERPL-SCNC: 105 MMOL/L — SIGNIFICANT CHANGE UP (ref 98–110)
CO2 SERPL-SCNC: 26 MMOL/L — SIGNIFICANT CHANGE UP (ref 17–32)
CREAT SERPL-MCNC: 0.9 MG/DL — SIGNIFICANT CHANGE UP (ref 0.7–1.5)
EGFR: 71 ML/MIN/1.73M2 — SIGNIFICANT CHANGE UP
EGFR: 71 ML/MIN/1.73M2 — SIGNIFICANT CHANGE UP
EOSINOPHIL # BLD AUTO: 0.16 K/UL — SIGNIFICANT CHANGE UP (ref 0–0.7)
EOSINOPHIL NFR BLD AUTO: 3 % — SIGNIFICANT CHANGE UP (ref 0–8)
GLUCOSE SERPL-MCNC: 109 MG/DL — HIGH (ref 70–99)
HCT VFR BLD CALC: 38.2 % — SIGNIFICANT CHANGE UP (ref 37–47)
HGB BLD-MCNC: 12.3 G/DL — SIGNIFICANT CHANGE UP (ref 12–16)
IMM GRANULOCYTES NFR BLD AUTO: 0.2 % — SIGNIFICANT CHANGE UP (ref 0.1–0.3)
LYMPHOCYTES # BLD AUTO: 2.25 K/UL — SIGNIFICANT CHANGE UP (ref 1.2–3.4)
LYMPHOCYTES # BLD AUTO: 42.5 % — SIGNIFICANT CHANGE UP (ref 20.5–51.1)
MCHC RBC-ENTMCNC: 30.1 PG — SIGNIFICANT CHANGE UP (ref 27–31)
MCHC RBC-ENTMCNC: 32.2 G/DL — SIGNIFICANT CHANGE UP (ref 32–37)
MCV RBC AUTO: 93.6 FL — SIGNIFICANT CHANGE UP (ref 81–99)
MONOCYTES # BLD AUTO: 0.51 K/UL — SIGNIFICANT CHANGE UP (ref 0.1–0.6)
MONOCYTES NFR BLD AUTO: 9.6 % — HIGH (ref 1.7–9.3)
NEUTROPHILS # BLD AUTO: 2.34 K/UL — SIGNIFICANT CHANGE UP (ref 1.4–6.5)
NEUTROPHILS NFR BLD AUTO: 44.1 % — SIGNIFICANT CHANGE UP (ref 42.2–75.2)
NRBC BLD AUTO-RTO: 0 /100 WBCS — SIGNIFICANT CHANGE UP (ref 0–0)
PLATELET # BLD AUTO: 173 K/UL — SIGNIFICANT CHANGE UP (ref 130–400)
PMV BLD: 12.6 FL — HIGH (ref 7.4–10.4)
POTASSIUM SERPL-MCNC: 4.5 MMOL/L — SIGNIFICANT CHANGE UP (ref 3.5–5)
POTASSIUM SERPL-SCNC: 4.5 MMOL/L — SIGNIFICANT CHANGE UP (ref 3.5–5)
PROT SERPL-MCNC: 7.2 G/DL — SIGNIFICANT CHANGE UP (ref 6–8)
RBC # BLD: 4.08 M/UL — LOW (ref 4.2–5.4)
RBC # FLD: 13.6 % — SIGNIFICANT CHANGE UP (ref 11.5–14.5)
SODIUM SERPL-SCNC: 143 MMOL/L — SIGNIFICANT CHANGE UP (ref 135–146)
TROPONIN T, HIGH SENSITIVITY RESULT: <6 NG/L — SIGNIFICANT CHANGE UP (ref 6–13)
TROPONIN T, HIGH SENSITIVITY RESULT: <6 NG/L — SIGNIFICANT CHANGE UP (ref 6–13)
WBC # BLD: 5.3 K/UL — SIGNIFICANT CHANGE UP (ref 4.8–10.8)
WBC # FLD AUTO: 5.3 K/UL — SIGNIFICANT CHANGE UP (ref 4.8–10.8)

## 2025-07-25 PROCEDURE — 93005 ELECTROCARDIOGRAM TRACING: CPT

## 2025-07-25 PROCEDURE — 36415 COLL VENOUS BLD VENIPUNCTURE: CPT

## 2025-07-25 PROCEDURE — 71046 X-RAY EXAM CHEST 2 VIEWS: CPT | Mod: 26

## 2025-07-25 PROCEDURE — 80053 COMPREHEN METABOLIC PANEL: CPT

## 2025-07-25 PROCEDURE — 85025 COMPLETE CBC W/AUTO DIFF WBC: CPT

## 2025-07-25 PROCEDURE — G0378: CPT

## 2025-07-25 PROCEDURE — 99223 1ST HOSP IP/OBS HIGH 75: CPT

## 2025-07-25 PROCEDURE — 99285 EMERGENCY DEPT VISIT HI MDM: CPT | Mod: 25

## 2025-07-25 PROCEDURE — 71046 X-RAY EXAM CHEST 2 VIEWS: CPT

## 2025-07-25 PROCEDURE — 93010 ELECTROCARDIOGRAM REPORT: CPT

## 2025-07-25 PROCEDURE — 84484 ASSAY OF TROPONIN QUANT: CPT

## 2025-07-25 RX ORDER — ADENOSINE 3 MG/ML
60 INJECTION, SOLUTION INTRAVENOUS ONCE
Refills: 0 | Status: DISCONTINUED | OUTPATIENT
Start: 2025-07-25 | End: 2025-07-26

## 2025-07-25 RX ORDER — ATORVASTATIN CALCIUM 80 MG/1
40 TABLET, FILM COATED ORAL AT BEDTIME
Refills: 0 | Status: DISCONTINUED | OUTPATIENT
Start: 2025-07-25 | End: 2025-07-26

## 2025-07-25 RX ORDER — ASPIRIN 325 MG
325 TABLET ORAL ONCE
Refills: 0 | Status: DISCONTINUED | OUTPATIENT
Start: 2025-07-25 | End: 2025-07-26

## 2025-07-25 RX ORDER — METOPROLOL SUCCINATE 50 MG/1
25 TABLET, EXTENDED RELEASE ORAL EVERY 12 HOURS
Refills: 0 | Status: DISCONTINUED | OUTPATIENT
Start: 2025-07-25 | End: 2025-07-26

## 2025-07-25 RX ADMIN — ATORVASTATIN CALCIUM 40 MILLIGRAM(S): 80 TABLET, FILM COATED ORAL at 22:52

## 2025-07-25 NOTE — ED PROVIDER NOTE - PHYSICAL EXAMINATION
CONSTITUTIONAL: no acute distress  SKIN: skin exam is warm and dry  HEAD: Normocephalic; atraumatic  ENT: MMM  NECK: ROM intact.  CARD: S1, S2 normal, no murmur  RESP: No wheezes, rales or rhonchi. Good air movement bilaterally  ABD: soft; non-distended; non-tender  EXT: Normal ROM.   NEURO: awake, alert, following commands, oriented, grossly unremarkable. No Focal deficits. GCS 15.   PSYCH: Cooperative

## 2025-07-25 NOTE — ED PROVIDER NOTE - CONSIDERATION OF ADMISSION OBSERVATION
Consideration of Admission/Observation Patient placed in EDOU for cardiac monitoring, nuclear stress test in AM.

## 2025-07-25 NOTE — ED PROVIDER NOTE - ATTENDING APP SHARED VISIT CONTRIBUTION OF CARE
65 y/o female with h/o CAD s/p CABG 3/2023, htn, hld, dm, asthma, in ER with c/o CP.  Pain started ~ 2 days ago, non-exertional, non-radiating.  worse with arm movements.  Denies any assoc SOB (has mild baseline sob 2/2 asthma, no change from baseline), no n/v, no diaphoresis.  no new LE pain/swelling (baseline RLE swelling after vein harvesting for cabg, no recent change).  no abd pain.  no ha/dizziness/syncope. no f/c.  Follows with card, Dr. Santa, had recent echo.  PE - nad, nc/at, eomi, perrl, op - clear, mmm, neck supple, cta b/l, no w/r/r, rrr, abd- soft, nt/nd, nabs, from x 4, no LLE swelling/tenderness,  mild RLE swelling (baseline per pt), no tenderness, A&O x 3, cn 2-12 intact, no focal motor/sensory deficits.     - Cardiac workup

## 2025-07-25 NOTE — ED PROVIDER NOTE - OBJECTIVE STATEMENT
64 year old female, past medical history htn, hdl, cabg, who presents with chest pain. patient with central chest pain described as pressure that began x2 days ago radiating to lue, no aggravating/alleviating factors. denies f/c, shortness of breath, hemoptysis, vomiting, diarrhea, leg pain/swelling. former smoker. patient follows with cardiologist, dr grover, bypass performed x2 years ago.

## 2025-07-25 NOTE — ED PROVIDER NOTE - CLINICAL SUMMARY MEDICAL DECISION MAKING FREE TEXT BOX
64-year-old female with PMHx as noted, ER with c/o CP for the past 2 days, worse with movement, not exertional.  Labs reviewed: CBC/CMP unremarkable.  HST negative x 2.  EKG: NSR@71, NAD, T wave flattening 1/aVL.  CXR negative.  Patient placed in EDOU for cardiac monitoring, nuclear stress test in AM.

## 2025-07-25 NOTE — ED ADULT NURSE NOTE - NSFALLUNIVINTERV_ED_ALL_ED
Bed/Stretcher in lowest position, wheels locked, appropriate side rails in place/Call bell, personal items and telephone in reach/Instruct patient to call for assistance before getting out of bed/chair/stretcher/Non-slip footwear applied when patient is off stretcher/[place] to call system/Physically safe environment - no spills, clutter or unnecessary equipment/Purposeful proactive rounding/Room/bathroom lighting operational, light cord in reach

## 2025-07-25 NOTE — ED ADULT NURSE NOTE - OBJECTIVE STATEMENT
Pt aaox4, c/o intermittent left side chest pressure worse when laying down, denies n/v, SOB or dizziness. No s/s distress noted

## 2025-07-26 VITALS
DIASTOLIC BLOOD PRESSURE: 88 MMHG | SYSTOLIC BLOOD PRESSURE: 162 MMHG | OXYGEN SATURATION: 97 % | TEMPERATURE: 98 F | HEART RATE: 72 BPM | RESPIRATION RATE: 19 BRPM

## 2025-07-26 PROCEDURE — 99239 HOSP IP/OBS DSCHRG MGMT >30: CPT

## 2025-07-26 RX ORDER — REGADENOSON 0.08 MG/ML
0.4 INJECTION, SOLUTION INTRAVENOUS ONCE
Refills: 0 | Status: DISCONTINUED | OUTPATIENT
Start: 2025-07-26 | End: 2025-07-26

## 2025-07-26 RX ADMIN — METOPROLOL SUCCINATE 25 MILLIGRAM(S): 50 TABLET, EXTENDED RELEASE ORAL at 06:51

## 2025-07-26 NOTE — ED CDU PROVIDER DISPOSITION NOTE - PATIENT PORTAL LINK FT
You can access the FollowMyHealth Patient Portal offered by [facility] by registering at the following website: [email or web address]. By joining [facility]’s FollowMyHealth portal, you will also be able to view your health information using other applications (apps) compatible with our system.

## 2025-07-26 NOTE — ED CDU PROVIDER DISPOSITION NOTE - CLINICAL COURSE
64-year-old woman, history of hypertension, hyperlipidemia, CAD status post CABG in March 2023 was placed in CDU for evaluation after she presented with left upper chest wall pain that began about 2 days ago.  Pain is worse with palpation and with abduction of the arm above the shoulder.  No exertional component, no shortness of breath, no trauma.  Patient has been compliant with medications, follows routinely with Dr. Lay, had a recent echo which was unremarkable.  Troponins <6 and <6, EKG without acute ischemic changes, troponins reassuring at <6 and <6.  Doubt cardiac etiology for symptoms, more likely related to the shoulder.  I spoke with patient at length regarding the nuclear stress test, she would prefer not to do it today and follow-up with her own cardiologist.  I think this is reasonable given that symptoms are so atypical and reproducible and that EKG and troponin x 2 okay.  Strict return precautions given.

## 2025-07-26 NOTE — ED CDU PROVIDER DISPOSITION NOTE - CARE PROVIDER_API CALL
Erlin Santa)  Interventional Cardiology  [address] [place] [address], [address]  [place] [address]  Phone: [phone]  Fax: [phone]  Follow Up Time:

## 2025-07-26 NOTE — ED CDU PROVIDER INITIAL DAY NOTE - ATTENDING APP SHARED VISIT CONTRIBUTION OF CARE
63 y/o female with h/o CAD s/p CABG 3/2023, htn, hld, dm, asthma, in ER with c/o CP.  Pain started ~ 2 days ago, non-exertional, non-radiating.  worse with arm movements.  Denies any assoc SOB (has mild baseline sob 2/2 asthma, no change from baseline), no n/v, no diaphoresis.  no new LE pain/swelling (baseline RLE swelling after vein harvesting for cabg, no recent change).  no abd pain.  no ha/dizziness/syncope. no f/c.  Follows with card, Dr. Santa, had recent echo.  PE - nad, nc/at, eomi, perrl, op - clear, mmm, neck supple, cta b/l, no w/r/r, rrr, abd- soft, nt/nd, nabs, from x 4, no LLE swelling/tenderness,  mild RLE swelling (baseline per pt), no tenderness, A&O x 3, cn 2-12 intact, no focal motor/sensory deficits.     - Cardiac workup

## 2025-07-26 NOTE — ED CDU PROVIDER INITIAL DAY NOTE - PROGRESS NOTE DETAILS
on reassessment, pt's exam is consistent with msk-like pain, trop x 2 neg, offered pt stress but rather f/u with her cardiologist as outpatient, will dc